# Patient Record
Sex: FEMALE | Race: WHITE | NOT HISPANIC OR LATINO | Employment: FULL TIME | URBAN - METROPOLITAN AREA
[De-identification: names, ages, dates, MRNs, and addresses within clinical notes are randomized per-mention and may not be internally consistent; named-entity substitution may affect disease eponyms.]

---

## 2017-05-05 ENCOUNTER — ALLSCRIPTS OFFICE VISIT (OUTPATIENT)
Dept: OTHER | Facility: OTHER | Age: 42
End: 2017-05-05

## 2017-05-17 ENCOUNTER — LAB CONVERSION - ENCOUNTER (OUTPATIENT)
Dept: OTHER | Facility: OTHER | Age: 42
End: 2017-05-17

## 2017-05-17 LAB — GLUCOSE SERPL-MCNC: 92 MG/DL

## 2017-07-05 ENCOUNTER — ALLSCRIPTS OFFICE VISIT (OUTPATIENT)
Dept: OTHER | Facility: OTHER | Age: 42
End: 2017-07-05

## 2017-07-05 LAB
BILIRUB UR QL STRIP: NEGATIVE
CLARITY UR: NORMAL
COLOR UR: CLEAR
GLUCOSE (HISTORICAL): NORMAL
HGB UR QL STRIP.AUTO: NEGATIVE
KETONES UR STRIP-MCNC: NEGATIVE MG/DL
LEUKOCYTE ESTERASE UR QL STRIP: NEGATIVE
NITRITE UR QL STRIP: NEGATIVE
PH UR STRIP.AUTO: 5 [PH]
PROT UR STRIP-MCNC: NEGATIVE MG/DL
SP GR UR STRIP.AUTO: 1.01
UROBILINOGEN UR QL STRIP.AUTO: NORMAL

## 2017-08-15 ENCOUNTER — TRANSCRIBE ORDERS (OUTPATIENT)
Dept: ADMINISTRATIVE | Facility: HOSPITAL | Age: 42
End: 2017-08-15

## 2017-08-15 DIAGNOSIS — Z12.31 ENCOUNTER FOR MAMMOGRAM TO ESTABLISH BASELINE MAMMOGRAM: Primary | ICD-10-CM

## 2017-08-21 ENCOUNTER — HOSPITAL ENCOUNTER (OUTPATIENT)
Dept: RADIOLOGY | Facility: HOSPITAL | Age: 42
Discharge: HOME | End: 2017-08-21
Attending: OBSTETRICS & GYNECOLOGY
Payer: COMMERCIAL

## 2017-08-21 DIAGNOSIS — Z12.31 ENCOUNTER FOR MAMMOGRAM TO ESTABLISH BASELINE MAMMOGRAM: ICD-10-CM

## 2017-08-21 PROCEDURE — G0202 SCR MAMMO BI INCL CAD: HCPCS

## 2017-08-28 ENCOUNTER — TRANSCRIBE ORDERS (OUTPATIENT)
Dept: ADMINISTRATIVE | Facility: HOSPITAL | Age: 42
End: 2017-08-28

## 2017-08-28 DIAGNOSIS — G40.209 CRYPTOGENIC PARTIAL COMPLEX EPILEPSY (HCC): Primary | ICD-10-CM

## 2017-09-06 ENCOUNTER — HOSPITAL ENCOUNTER (OUTPATIENT)
Dept: NEUROLOGY | Facility: HOSPITAL | Age: 42
Discharge: HOME | End: 2017-09-06
Payer: COMMERCIAL

## 2017-09-06 DIAGNOSIS — G40.209 CRYPTOGENIC PARTIAL COMPLEX EPILEPSY (HCC): ICD-10-CM

## 2017-09-06 PROCEDURE — 95816 EEG AWAKE AND DROWSY: CPT

## 2017-09-08 ENCOUNTER — ALLSCRIPTS OFFICE VISIT (OUTPATIENT)
Dept: OTHER | Facility: OTHER | Age: 42
End: 2017-09-08

## 2018-01-09 NOTE — PROGRESS NOTES
Assessment    1  Encounter for preventive health examination (V70 0) (Z00 00)   2  Classic migraine with aura (346 00) (G43 109)   3  Generalized nonconvulsive seizure (345 00) (G40 309)   4  History of Need for vaccination (V05 9) (Z23)   5  Need for vaccination (V05 9) (Z23)   6  Hyperlipemia, mixed (272 2) (E78 2)   7  GERD (gastroesophageal reflux disease) (530 81) (K21 9)    Plan  Classic migraine with aura    · CarBAMazepine 200 MG Oral Tablet; 1 Three Times A Day, dispense as  written   · SUMAtriptan Succinate 100 MG Oral Tablet (Imitrex); TAKE 1 TABLET WITH  AURA  MAY REPEAT DOSE IN 2 HOURS IF HEADACHES CONTINUE  Pt suffers from 15 migrains a month   · Sumavel DosePro 6 MG/0 5ML Subcutaneous Solution Jet-injector; 6mg SC x  1  not to exceed 2 doses in 24 hrs  Classic migraine with aura, Generalized nonconvulsive seizure    · (1) CBC/PLT/DIFF; Status:Active; Requested for:03Apr2017;    · (1) COMPREHENSIVE METABOLIC PANEL; Status:Active; Requested for:03Apr2017;    · Follow-up visit in 6 months Evaluation and Treatment  Follow-up  Status: Hold For -  Scheduling  Requested for: 40QAM6258  GERD (gastroesophageal reflux disease)    · Omeprazole 40 MG Oral Capsule Delayed Release (PriLOSEC); take 1 capsule by  mouth daily   · Follow-up visit in 1 month Evaluation and Treatment  Follow-up  Status: Hold For -  Scheduling  Requested for: 49Hmy0626  Hyperlipemia, mixed    · Call [phone] if: You have muscle cramps ; Status:Complete;   Done: 97OGP6314  10:10AM   · Call [phone] if: You have pain in the stomach area ; Status:Complete;   Done:  20BNH5799 10:10AM   · Call [phone] if: You start vomiting ; Status:Complete;   Done: 84TCM5300 10:10AM   · Call 911 if:  You experience a new kind of chest pain (angina) or pressure ;  Status:Complete;   Done: 32EPA0712 10:10AM   · Call 911 if: You have any symptoms of a stroke ; Status:Complete;   Done: 13ULF1644  10:10AM   · Begin a limited exercise program ; Status:Complete;   Done: 58LKR2199 10:10AM   · Begin or continue regular aerobic exercise  Gradually work up to at least 3 sessions of 30  minutes of exercise a week ; Status:Complete;   Done: 17CXY3556 10:10AM   · Continue with our present treatment plan ; Status:Complete;   Done: 98AOG1429 10:10AM   · Eat a low fat and low cholesterol diet ; Status:Complete;   Done: 80BEQ2456 10:10AM   · Eat no more than 30 grams of fat per day ; Status:Complete;   Done: 79FPZ1244 10:10AM   · Keep a diary of when and what you eat ; Status:Complete;   Done: 26UGJ1384 10:10AM   · Some eating tips that can help you lose weight ; Status:Complete;   Done: 99TCE2438  10:10AM   · We recommend that you bring your body mass index down to 26 ; Status:Complete;    Done: 97ERP7167 10:10AM   · We want you to follow the Therapeutic Lifestyle Changes (TLC) diet ; Status:Complete;    Done: 26KGX7388 10:10AM   · You need to quit smoking ; Status:Complete;   Done: 51TBR5509 10:10AM  Need for vaccination    · Flulaval Quadrivalent 0 5 ML Intramuscular Suspension; IM as ordered; To  Be Done: 88QPO9476    Discussion/Summary  health maintenance visit     Pt advised on probiotics      will follow response to prilosec in a month  Chief Complaint  CPE rmklpn      History of Present Illness  HPI: as above    Pt is here for a full physical  Pt had her mammo in july    pt states while she is here she has issues to discuss  pt states she has had some diarrhea and pian in her stomach randomly - states this has been on and off, for months  states its not enough to stop her day  she states she has diarrhea more often than often than not, goes once or twice in a day, pt states sometimes the abdominal pain in under her diaphram sometimes it lower  sometits a burning feeling      Pt states he was here 2 years ago with a UTI  states she seems to have an ongoing issue with that where she feels she has a UTI  states in [place] - states she was going to get her period she had blood in her urine - pt was placed on abx  pt states she has a history of colon ca - her grandmother and her parents - her grandmother was diagnosed in her [de-identified]  pt states dr Cleveland Quintanilla has been watching her sugar for her ovarian cysts    pt would like her flu shot       Review of Systems    Constitutional: No fever, no chills, feels well, no tiredness, no recent weight gain or weight loss  Eyes: No complaints of eye pain, no red eyes, no eyesight problems, no discharge, no dry eyes, no itching of eyes  ENT: no complaints of earache, no loss of hearing, no nose bleeds, no nasal discharge, no sore throat, no hoarseness  Cardiovascular: No complaints of slow heart rate, no fast heart rate, no chest pain, no palpitations, no leg claudication, no lower extremity edema  Respiratory: No complaints of shortness of breath, no wheezing, no cough, no SOB on exertion, no orthopnea, no PND  Gastrointestinal: abdominal pain, nausea and diarrhea, but as noted in HPI and no constipation  Genitourinary: No complaints of dysuria, no incontinence, no pelvic pain, no dysmenorrhea, no vaginal discharge or bleeding  Musculoskeletal: No complaints of arthralgias, no myalgias, no joint swelling or stiffness, no limb pain or swelling  Integumentary: No complaints of skin rash or lesions, no itching, no skin wounds, no breast pain or lump  Neurological: No complaints of headache, no confusion, no convulsions, no numbness, no dizziness or fainting, no tingling, no limb weakness, no difficulty walking  Psychiatric: Not suicidal, no sleep disturbance, no anxiety or depression, no change in personality, no emotional problems  Endocrine: No complaints of proptosis, no hot flashes, no muscle weakness, no deepening of the voice, no feelings of weakness     Hematologic/Lymphatic: No complaints of swollen glands, no swollen glands in the neck, does not bleed easily, does not bruise easily  Active Problems    1  Anxiety disorder (300 00) (F41 9)   2  Classic migraine with aura (346 00) (G43 109)   3  Encounter for screening for cardiovascular disorders (V81 2) (Z13 6)   4  Encounter for screening mammogram for breast cancer (V76 12) (Z12 31)   5  Former smoker (V15 82) (B95 958)   6  Generalized nonconvulsive seizure (345 00) (G40 309)   7  Screening for diabetes mellitus (DM) (V77 1) (Z13 1)   8  Screening for thyroid disorder (V77 0) (Z13 29)   9  Strain of thoracic region (847 1) (S29 019A)   10  UTI (lower urinary tract infection) (599 0) (N39 0)   11  Well adult on routine health check (V70 0) (Z00 00)    Past Medical History    · History of Acute maxillary sinusitis (461 0) (J01 00)   · History of Acute upper respiratory infection (465 9) (J06 9)   · History of Cervical radiculopathy (723 4) (M54 12)   · History of chest pain (V13 89) (I16 046)   · History of Lightheadedness (780 4) (R42)   · History of Near syncope (780 2) (R55)   · History of Need for vaccination (V05 9) (Z23)   · History of Pain, upper back (724 5) (M54 9)   · History of Seizure (780 39) (R56 9)    Surgical History    · History of Adenoidectomy   · History of Ovarian Cystectomy   · History of Renal Lithotripsy    Family History  Mother    · Family history of Multiple sclerosis  Maternal Grandmother    · Family history of Colon Cancer (V16 0)  Family History    · Family history of Arthritis (V17 7)   · Family history of Colon Cancer (V16 0)   · Family history of Osteoporosis (V17 81)    Social History    · Denied: History of Alcohol Use (History)   · Daily Coffee Consumption (___ Cups/Day)   · Daily Tea Consumption (___ Cups/Day)   ·    · Former smoker (V15 82) (A14 138)   · Lack of exercise (V69 0) (Z72 3)   · Rarely consumes alcohol (V49 89) (Z78 9)   · Sleeps 6 -7 hours a day    Current Meds   1  CarBAMazepine 200 MG Oral Tablet; 1 Three Times A Day, dispense as written;    Therapy: 04MSG1804 to (Last Rx:37Cin5169)  Requested for: 30KTU2573 Ordered   2  Loestrin 24 Fe 1-20 MG-MCG TABS; 1 Every Day; Therapy: 69NLY5927 to  Requested for: 81XYU0592 Recorded   3  SUMAtriptan Succinate 100 MG Oral Tablet; TAKE 1 TABLET WITH AURA  MAY REPEAT   DOSE IN 2 HOURS IF HEADACHES CONTINUE  Pt suffers from 15 migrains a month; Therapy: 41ENN0885 to (Evaluate:20Oct2016)  Requested for: 33XJS4600; Last   Rx:98Fxn4800 Ordered   4  Sumavel DosePro 6 MG/0 5ML Subcutaneous Solution Jet-injector; 6mg SC x 1   not to exceed 2 doses in 24 hrs; Therapy: 39QWD3103 to (Last Rx:59Dqe5124)  Requested for: 98TJN2298 Ordered   5  Valtrex 1 GM Oral Tablet; Take 1 tablet daily Recorded    Allergies    1  Dilantin CAPS    Vitals   Recorded: 13FGV1937 72:61KC   Systolic 059   Diastolic 70   Heart Rate 78   Respiration 18   Temperature 97 4 F   Height 5 ft 1 in   Weight 133 lb    BMI Calculated 25 13   BSA Calculated 1 58     Physical Exam    Constitutional   General appearance: No acute distress, well appearing and well nourished  Eyes   Conjunctiva and lids: No swelling, erythema or discharge  Pupils and irises: Equal, round, reactive to light  Ophthalmoscopic examination: Normal fundi and optic discs  Ears, Nose, Mouth, and Throat   External inspection of ears and nose: Normal     Otoscopic examination: Tympanic membranes translucent with normal light reflex  Canals patent without erythema  Hearing: Normal     Nasal mucosa, septum, and turbinates: Normal without edema or erythema  Lips, teeth, and gums: Normal, good dentition  Oropharynx: Normal with no erythema, edema, exudate or lesions  Neck   Neck: Supple, symmetric, trachea midline, no masses  Thyroid: Normal, no thyromegaly  Pulmonary   Respiratory effort: No increased work of breathing or signs of respiratory distress  Percussion of chest: Normal     Palpation of chest: Normal     Auscultation of lungs: Clear to auscultation  Cardiovascular   Palpation of heart: Normal PMI, no thrills  Auscultation of heart: Normal rate and rhythm, normal S1 and S2, no murmurs  Carotid pulses: 2+ bilaterally  Abdominal aorta: Normal     Femoral pulses: 2+ bilaterally  Pedal pulses: 2+ bilaterally  Examination of extremities for edema and/or varicosities: Normal     Chest   Breasts: Normal, no dimpling or skin changes appreciated  Palpation of breasts and axillae: Normal, no masses palpated  Abdomen   Abdomen: Non-tender, no masses  Liver and spleen: No hepatomegaly or splenomegaly  Lymphatic   Palpation of lymph nodes in neck: No lymphadenopathy  Palpation of lymph nodes in axillae: No lymphadenopathy  Palpation of lymph nodes in groin: No lymphadenopathy  Palpation of lymph nodes in other areas: No lymphadenopathy  Musculoskeletal   Gait and station: Normal     Digits and nails: Normal without clubbing or cyanosis  Joints, bones, and muscles: Normal     Range of motion: Normal     Stability: Normal     Muscle strength/tone: Normal     Skin   Skin and subcutaneous tissue: Normal without rashes or lesions  Palpation of skin and subcutaneous tissue: Normal turgor  Neurologic   Cranial nerves: Cranial nerves II-XII intact  Reflexes: 2+ and symmetric  Sensation: No sensory loss  Psychiatric   Judgment and insight: Normal     Orientation to person, place, and time: Normal     Recent and remote memory: Intact      Mood and affect: Normal        Results/Data  () CMP14+eGFR 38VSO5345 10:22AM [facility]     Test Name Result Flag Reference   Glucose, Serum 82 mg/dL  65-99   BUN 14 mg/dL  6-24   Creatinine, Serum 0 70 mg/dL  0 57-1 00   eGFR If NonAfricn Am 109 mL/min/1 73  >59   eGFR If Africn Am 125 mL/min/1 73  >59   BUN/Creatinine Ratio 20  9-23   Sodium, Serum 142 mmol/L  134-144   Potassium, Serum 4 7 mmol/L  3 5-5 2   Chloride, Serum 105 mmol/L     Carbon Dioxide, Total 23 mmol/L  18-29 Calcium, Serum 8 6 mg/dL L 8 7-10 2   Protein, Total, Serum 6 0 g/dL  6 0-8 5   Albumin, Serum 3 8 g/dL  3 5-5 5   Globulin, Total 2 2 g/dL  1 5-4 5   A/G Ratio 1 7  1 1-2 5   Bilirubin, Total 0 2 mg/dL  0 0-1 2   Alkaline Phosphatase, S 60 IU/L     AST (SGOT) 20 IU/L  0-40   ALT (SGPT) 14 IU/L  0-32     (LC) Lipid Panel With LDL/HDL Ratio 29NAL9983 10:22AM [facility]     Test Name Result Flag Reference   Cholesterol, Total 204 mg/dL H 100-199   Triglycerides 70 mg/dL  0-149   HDL Cholesterol 92 mg/dL  >39   According to ATP-III Guidelines, HDL-C >59 mg/dL is considered a  negative risk factor for CHD  VLDL Cholesterol Ray 14 mg/dL  5-40   LDL Cholesterol Calc 98 mg/dL  0-99   LDL/HDL Ratio 1 1 ratio units  0 0-3 2   LDL/HDL Ratio                                                             Men  Women                                               1/2 Avg  Risk  1 0    1 5                                                   Avg Risk  3 6    3 2                                                2X Avg  Risk  6 2    5 0                                                3X Avg  Risk  8 0    6 1     () TSH+Free T4 [id number] 10:22AM [facility]     Test Name Result Flag Reference   TSH 1 430 uIU/mL  0 450-4 500   T4,Free(Direct) 0 76 ng/dL L 0 82-1 77     (LC) CBC+Platelet+Hem Review 69QAN3925 10:22AM [facility]     Test Name Result Flag Reference   WBC 6 0 x10E3/uL  3 4-10 8   RBC 4 11 x10E6/uL  3 77-5 28   Hemoglobin 13 0 g/dL  11 1-15 9   Hematocrit 37 8 %  34 0-46  6   MCV 92 fL  79-97   MCH 31 6 pg  26 6-33 0   MCHC 34 4 g/dL  31 5-35 7   RDW 12 7 %  12 3-15 4   Platelets 113 F51P7/GY  150-379   Neutrophils 46 %     Lymphs 45 %     Monocytes 7 %     Eos 0 %     Basos 2 %     Neutrophils Absolute 2 8 X10E3/uL  1 4-7 0   Lymphs (Absolute) 2 7 X10E3/uL  0 7-3 1   Monocytes(Absolute) 0 4 X10E3/uL  0 1-0 9   Eos (Absolute Value) 0 0 X10E3/uL  0 0-0 4   Baso(Absolute) 0 1 X10E3/uL  0 0-0 2   RBC Comment RBC's appear normal   Normal   Platelet Comment Comment  Adequate   Platelet count verified by examination of peripheral blood smear  Procedure    Procedure: Visual Acuity Test    Indication: routine screening  Inforrmation supplied by a Snellen chart     Results: 20/25 in both eyes without corrective device, 20/30 in the right eye without corrective device, 20/25 in the left eye without corrective device      Signatures   Electronically signed by : Cristopher Morales DO; Oct  3 2016 10:16AM EST                       (Author)

## 2018-01-12 VITALS
RESPIRATION RATE: 16 BRPM | HEIGHT: 61 IN | WEIGHT: 128 LBS | HEART RATE: 74 BPM | BODY MASS INDEX: 24.17 KG/M2 | SYSTOLIC BLOOD PRESSURE: 120 MMHG | DIASTOLIC BLOOD PRESSURE: 82 MMHG | TEMPERATURE: 98.4 F

## 2018-01-12 NOTE — RESULT NOTES
Verified Results  [facility]) CBC+Platelet+Hem Review 90EOG2443 10:22AM [facility]     Test Name Result Flag Reference   Platelet Comment Comment  Adequate   Platelet count verified by examination of peripheral blood smear     RBC Comment RBC's appear normal   Normal   Baso(Absolute) 0 1 X10E3/uL  0 0-0 2   Eos (Absolute Value) 0 0 X10E3/uL  0 0-0 4   Monocytes(Absolute) 0 4 X10E3/uL  0 1-0 9   Lymphs (Absolute) 2 7 X10E3/uL  0 7-3 1   Neutrophils Absolute 2 8 X10E3/uL  1 4-7 0   Basos 2 %     Eos 0 %     Monocytes 7 %     Lymphs 45 %     Neutrophils 46 %     Platelets 824 J67G8/KS  150-379   RDW 12 7 %  12 3-15 4   MCHC 34 4 g/dL  31 5-35 7   MCH 31 6 pg  26 6-33 0   MCV 92 fL  79-97   Hematocrit 37 8 %  34 0-46 6   Hemoglobin 13 0 g/dL  11 1-15 9   RBC 4 11 x10E6/uL  3 77-5 28   WBC 6 0 x10E3/uL  3 4-10 8       Discussion/Summary   lab acceptable

## 2018-01-13 VITALS
WEIGHT: 136 LBS | SYSTOLIC BLOOD PRESSURE: 126 MMHG | DIASTOLIC BLOOD PRESSURE: 84 MMHG | HEART RATE: 88 BPM | BODY MASS INDEX: 25.68 KG/M2 | RESPIRATION RATE: 16 BRPM | TEMPERATURE: 97.9 F | HEIGHT: 61 IN

## 2018-01-14 VITALS
HEIGHT: 61 IN | BODY MASS INDEX: 25.86 KG/M2 | TEMPERATURE: 98.4 F | DIASTOLIC BLOOD PRESSURE: 70 MMHG | RESPIRATION RATE: 16 BRPM | HEART RATE: 82 BPM | WEIGHT: 137 LBS | SYSTOLIC BLOOD PRESSURE: 122 MMHG

## 2018-01-16 NOTE — RESULT NOTES
Verified Results  [facility]) CMP14+eGFR [id number] 10:22AM [facility]     Test Name Result Flag Reference   Glucose, Serum 82 mg/dL  65-99   BUN 14 mg/dL  6-24   Creatinine, Serum 0 70 mg/dL  0 57-1 00   eGFR If NonAfricn Am 109 mL/min/1 73  >59   eGFR If Africn Am 125 mL/min/1 73  >59   BUN/Creatinine Ratio 20  9-23   Sodium, Serum 142 mmol/L  134-144   Potassium, Serum 4 7 mmol/L  3 5-5 2   Chloride, Serum 105 mmol/L     Carbon Dioxide, Total 23 mmol/L  18-29   Calcium, Serum 8 6 mg/dL L 8 7-10 2   Protein, Total, Serum 6 0 g/dL  6 0-8 5   Albumin, Serum 3 8 g/dL  3 5-5 5   Globulin, Total 2 2 g/dL  1 5-4 5   A/G Ratio 1 7  1 1-2 5   Bilirubin, Total 0 2 mg/dL  0 0-1 2   Alkaline Phosphatase, S 60 IU/L     AST (SGOT) 20 IU/L  0-40   ALT (SGPT) 14 IU/L  0-32     (LC) Lipid Panel With LDL/HDL Ratio [id number] 10:22AM [facility]     Test Name Result Flag Reference   Cholesterol, Total 204 mg/dL H 100-199   Triglycerides 70 mg/dL  0-149   HDL Cholesterol 92 mg/dL  >39   According to ATP-III Guidelines, HDL-C >59 mg/dL is considered a  negative risk factor for CHD  VLDL Cholesterol Ray 14 mg/dL  5-40   LDL Cholesterol Calc 98 mg/dL  0-99   LDL/HDL Ratio 1 1 ratio units  0 0-3 2   LDL/HDL Ratio                                                             Men  Women                                               1/2 Avg  Risk  1 0    1 5                                                   Avg Risk  3 6    3 2                                                2X Avg  Risk  6 2    5 0                                                3X Avg  Risk  8 0    6 1     (LC) TSH+Free T4 [id number] 10:22AM [facility]     Test Name Result Flag Reference   TSH 1 430 uIU/mL  0 450-4 500   T4,Free(Direct) 0 76 ng/dL L 0 82-1 77       Discussion/Summary   labs acceptable

## 2018-01-26 DIAGNOSIS — E78.2 HYPERLIPEMIA, MIXED: ICD-10-CM

## 2018-01-26 DIAGNOSIS — G43.109 MIGRAINE WITH AURA AND WITHOUT STATUS MIGRAINOSUS, NOT INTRACTABLE: Primary | ICD-10-CM

## 2018-01-26 RX ORDER — NORETHINDRONE ACETATE AND ETHINYL ESTRADIOL AND FERROUS FUMARATE 1MG-20(24)
KIT ORAL DAILY
COMMUNITY
Start: 2010-10-05 | End: 2018-03-27

## 2018-01-26 RX ORDER — NIFEDIPINE 20 MG/1
CAPSULE ORAL 2 TIMES DAILY
COMMUNITY
Start: 2017-07-05 | End: 2018-03-27

## 2018-01-26 RX ORDER — CARBAMAZEPINE 200 MG/1
2 TABLET ORAL 2 TIMES DAILY
COMMUNITY
Start: 2012-04-24 | End: 2018-03-27

## 2018-01-26 RX ORDER — SUMATRIPTAN 100 MG/1
100 TABLET, FILM COATED ORAL ONCE AS NEEDED
Qty: 15 TABLET | Refills: 1 | Status: SHIPPED | OUTPATIENT
Start: 2018-01-26 | End: 2018-03-27 | Stop reason: SDUPTHER

## 2018-01-26 RX ORDER — SUMATRIPTAN 100 MG/1
TABLET, FILM COATED ORAL
COMMUNITY
Start: 2012-06-08 | End: 2018-01-26 | Stop reason: SDUPTHER

## 2018-01-26 RX ORDER — VALACYCLOVIR HYDROCHLORIDE 1 G/1
1 TABLET, FILM COATED ORAL DAILY
COMMUNITY
End: 2018-03-27

## 2018-03-27 ENCOUNTER — OFFICE VISIT (OUTPATIENT)
Dept: FAMILY MEDICINE CLINIC | Facility: CLINIC | Age: 43
End: 2018-03-27
Payer: COMMERCIAL

## 2018-03-27 VITALS
SYSTOLIC BLOOD PRESSURE: 112 MMHG | HEART RATE: 80 BPM | DIASTOLIC BLOOD PRESSURE: 74 MMHG | RESPIRATION RATE: 16 BRPM | BODY MASS INDEX: 23.79 KG/M2 | HEIGHT: 61 IN | TEMPERATURE: 97.6 F | WEIGHT: 126 LBS

## 2018-03-27 DIAGNOSIS — F41.9 ANXIETY DISORDER, UNSPECIFIED TYPE: ICD-10-CM

## 2018-03-27 DIAGNOSIS — Z00.00 WELL ADULT EXAM: Primary | ICD-10-CM

## 2018-03-27 DIAGNOSIS — Z13.6 SCREENING FOR CARDIOVASCULAR CONDITION: ICD-10-CM

## 2018-03-27 DIAGNOSIS — R22.1 MASS IN NECK: ICD-10-CM

## 2018-03-27 DIAGNOSIS — G43.109 MIGRAINE WITH AURA AND WITHOUT STATUS MIGRAINOSUS, NOT INTRACTABLE: ICD-10-CM

## 2018-03-27 DIAGNOSIS — E78.2 HYPERLIPEMIA, MIXED: ICD-10-CM

## 2018-03-27 PROCEDURE — 99396 PREV VISIT EST AGE 40-64: CPT | Performed by: FAMILY MEDICINE

## 2018-03-27 RX ORDER — PROPRANOLOL HYDROCHLORIDE 10 MG/1
10 TABLET ORAL 3 TIMES DAILY
Qty: 90 TABLET | Refills: 3 | Status: SHIPPED | OUTPATIENT
Start: 2018-03-27 | End: 2019-04-03 | Stop reason: ALTCHOICE

## 2018-03-27 RX ORDER — CEFUROXIME AXETIL 250 MG/1
TABLET ORAL
COMMUNITY
Start: 2018-01-12 | End: 2018-03-27

## 2018-03-27 RX ORDER — VALACYCLOVIR HYDROCHLORIDE 500 MG/1
500 TABLET, FILM COATED ORAL
Refills: 2 | COMMUNITY
Start: 2018-03-20

## 2018-03-27 RX ORDER — CARBAMAZEPINE 300 MG/1
300 CAPSULE, EXTENDED RELEASE ORAL EVERY 12 HOURS
Refills: 1 | COMMUNITY
Start: 2018-01-23

## 2018-03-27 RX ORDER — SUMATRIPTAN 100 MG/1
100 TABLET, FILM COATED ORAL ONCE AS NEEDED
Qty: 15 TABLET | Refills: 3 | Status: SHIPPED | OUTPATIENT
Start: 2018-03-27 | End: 2018-09-12 | Stop reason: SDUPTHER

## 2018-03-27 NOTE — PROGRESS NOTES
FAMILY PRACTICE HEALTH MAINTENANCE OFFICE VISIT  [facility]    NAME: Grace Smith  AGE: 43 y o   SEX: female  : 1975     DATE: 3/27/2018    Assessment and Plan     Problem List Items Addressed This Visit     Classic migraine with aura    Relevant Medications    propranolol (INDERAL) 10 mg tablet    SUMAtriptan (IMITREX) 100 mg tablet    Other Relevant Orders    Lipid Panel with Direct LDL reflex    Comprehensive metabolic panel    CBC and differential    TSH, 3rd generation    Anxiety disorder    Relevant Orders    Lipid Panel with Direct LDL reflex    Comprehensive metabolic panel    CBC and differential    TSH, 3rd generation    Hyperlipemia, mixed    Relevant Orders    Lipid Panel with Direct LDL reflex    Comprehensive metabolic panel    CBC and differential    TSH, 3rd generation      Other Visit Diagnoses     Well adult exam    -  Primary    Relevant Orders    Lipid Panel with Direct LDL reflex    Comprehensive metabolic panel    CBC and differential    TSH, 3rd generation    Screening for cardiovascular condition        Relevant Orders    Lipid Panel with Direct LDL reflex    Comprehensive metabolic panel    CBC and differential    TSH, 3rd generation    Mass in neck        Relevant Orders    Lipid Panel with Direct LDL reflex    Comprehensive metabolic panel    CBC and differential    TSH, 3rd generation    US head neck soft tissue            · Patient Counseling:   · Nutrition: Stressed importance of a well balanced diet, moderation of sodium/saturated fat, caloric balance and sufficient intake of fiber  · Exercise: Stressed the importance of regular exercise with a goal of 150 minutes per week  · Dental Health: Discussed daily flossing and brushing and regular dental visits   · Sexuality: Discussed sexually transmitted infections, use of condoms and prevention of unintended pregnancy  · Alcohol Use:  Recommended moderation of alcohol intake  · Injury Prevention: Discussed Safety Belts, Safety Helmets, and Smoke Detectors    · Immunizations reviewed  · Discussed benefits of screening yes  · Discussed the patient's BMI with her  The BMI is in the acceptable range     Return in about 4 weeks (around 4/24/2018)  Chief Complaint     Chief Complaint   Patient presents with    Physical Exam     Pt here for a CPE         History of Present Illness     Pt is here for a full physical    Pt states we tried a med for her migrains states we upped it and it gave her HA  This is nifedipine    Last mammo was in July last year - was acceptable  Pap/pelvic up to date        Well Adult Physical   Patient here for a comprehensive physical exam       Diet and Physical Activity  Diet: well balanced diet  Weight concerns: None, patient's BMI is between 18 5-24 9  Exercise: daily      Depression Screen  PHQ-9 Depression Screening    PHQ-9:    Frequency of the following problems over the past two weeks:       Little interest or pleasure in doing things:  0 - not at all  Feeling down, depressed, or hopeless:  0 - not at all  PHQ-2 Score:  0          General Health  Hearing: Normal:  bilateral  Vision: no vision problems  Dental: regular dental visits          The following portions of the patient's history were reviewed and updated as appropriate: allergies, current medications, past family history, past medical history, past social history, past surgical history and problem list     Review of Systems     Review of Systems    Past Medical History     Past Medical History:   Diagnosis Date    Anxiety     Cervical radiculopathy     RESOLVED: 76ZRH4503    Seizure ([facility] [address] )        Past Surgical History     Past Surgical History:   Procedure Laterality Date    ADENOIDECTOMY      LITHOTRIPSY  2006    RENAL    OVARIAN CYST REMOVAL  2012       Social History     Social History     Social History    Marital status: /Civil Union     Spouse name: N/A    Number of children: N/A  Years of education: N/A     Social History Main Topics    Smoking status: Former Smoker    Smokeless tobacco: Never Used    Alcohol use No      Comment: RARELY CONSUMES ALCOHOL AS PER ALL SCRIPTS     Drug use: Unknown    Sexual activity: Not Asked     Other Topics Concern    None     Social History Narrative    Daily coffee consumption    Daily tea consumption     as per all scripts    Lack of exercise    Sleeps 6-7 hours a day        Family History     Family History   Problem Relation Age of Onset    Multiple sclerosis Mother     Colon cancer Maternal Grandmother     Rheum arthritis Maternal Grandmother     Arthritis Family     Colon cancer Family     Osteoporosis Family     Anxiety disorder Father     Chromosomal disorder Cousin        Current Medications       Current Outpatient Prescriptions:     carBAMazepine (CARBATROL) 300 MG 12 hr capsule, TK 1 C PO BID, Disp: , Rfl: 1    SUMAtriptan (IMITREX) 100 mg tablet, Take 1 tablet (100 mg total) by mouth once as needed for migraine for up to 1 dose, Disp: 15 tablet, Rfl: 3    valACYclovir (VALTREX) 500 mg tablet, TK 1 T PO QD, Disp: , Rfl: 2    propranolol (INDERAL) 10 mg tablet, Take 1 tablet (10 mg total) by mouth 3 (three) times a day, Disp: 90 tablet, Rfl: 3     Allergies     Allergies   Allergen Reactions    Phenytoin Rash     Reaction Date: [id number];         Objective     /74   Pulse 80   Temp 97 6 °F (36 4 °C)   Resp 16   Ht 5' 0 75" (1 543 m)   Wt 57 2 kg (126 lb)   LMP 03/19/2018 (Approximate)   BMI 24 00 kg/m²      Physical Exam       Visual Acuity Screening    Right eye Left eye Both eyes   Without correction:      With correction: 20/20 20/20 20/20       Health Maintenance     Health Maintenance   Topic Date Due    HIV SCREENING  1975    Depression Screening PHQ-9  09/11/1987    INFLUENZA VACCINE  09/01/2017    DTaP,Tdap,and Td Vaccines (2 - Td) 10/05/2020     Immunization History   Administered Date(s) Administered    Influenza Quadrivalent Preservative Free 3 years and older IM 10/03/2016    Influenza Quadrivalent, 6-35 Months IM 12/04/2015    Tdap 10/05/2010       [facility], [address]

## 2018-03-27 NOTE — PATIENT INSTRUCTIONS
Acute Headache   WHAT YOU NEED TO KNOW:   An acute headache is pain or discomfort that starts suddenly and gets worse quickly  You may have an acute headache only when you feel stress or eat certain foods  Other acute headache pain can happen every day, and sometimes several times a day  DISCHARGE INSTRUCTIONS:   Return to the emergency department if:   · You have severe pain  · You have numbness or weakness on one side of your face or body  · You have a headache that occurs after a blow to the head, a fall, or other trauma  · You have a headache, are forgetful or confused, or have trouble speaking  · You have a headache, stiff neck, and a fever  Contact your healthcare provider if:   · You have a constant headache and are vomiting  · You have a headache each day that does not get better, even after treatment  · You have changes in your headaches, or new symptoms that occur when you have a headache  · You have questions or concerns about your condition or care  Medicines: You may need any of the following:  · Prescription pain medicine  may be given  The medicine your healthcare provider recommends will depend on the kind of headaches you have  You will need to take prescription headache medicines as directed to prevent a problem called rebound headache  These headaches happen with regular use of pain relievers for headache disorders  · NSAIDs , such as ibuprofen, help decrease swelling, pain, and fever  This medicine is available with or without a doctor's order  NSAIDs can cause stomach bleeding or kidney problems in certain people  If you take blood thinner medicine, always ask your healthcare provider if NSAIDs are safe for you  Always read the medicine label and follow directions  · Acetaminophen  decreases pain and fever  It is available without a doctor's order  Ask how much to take and how often to take it  Follow directions   Read the labels of all other medicines you are using to see if they also contain acetaminophen, or ask your doctor or pharmacist  Acetaminophen can cause liver damage if not taken correctly  Do not use more than 3 grams (3,000 milligrams) total of acetaminophen in one day  · Antidepressants  may be given for some kinds of headaches  · Take your medicine as directed  Contact your healthcare provider if you think your medicine is not helping or if you have side effects  Tell him or her if you are allergic to any medicine  Keep a list of the medicines, vitamins, and herbs you take  Include the amounts, and when and why you take them  Bring the list or the pill bottles to follow-up visits  Carry your medicine list with you in case of an emergency  Manage your symptoms:   · Apply heat or ice  on the headache area  Use a heat or ice pack  For an ice pack, you can also put crushed ice in a plastic bag  Cover the pack or bag with a towel before you apply it to your skin  Ice and heat both help decrease pain, and heat also helps decrease muscle spasms  Apply heat for 20 to 30 minutes every 2 hours  Apply ice for 15 to 20 minutes every hour  Apply heat or ice for as long and for as many days as directed  You may alternate heat and ice  · Relax your muscles  Lie down in a comfortable position and close your eyes  Relax your muscles slowly  Start at your toes and work your way up your body  · Keep a record of your headaches  Write down when your headaches start and stop  Include your symptoms and what you were doing when the headache began  Record what you ate or drank for 24 hours before the headache started  Describe the pain and where it hurts  Keep track of what you did to treat your headache and if it worked  Prevent an acute headache:   · Avoid anything that triggers an acute headache  Examples include exposure to chemicals, going to high altitude, or not getting enough sleep  Create a regular sleep routine   Go to sleep at the same time and wake up at the same time each day  Do not use electronic devices before bedtime  These may trigger a headache or prevent you from sleeping well  · Do not smoke  Nicotine and other chemicals in cigarettes and cigars can trigger an acute headache or make it worse  Ask your healthcare provider for information if you currently smoke and need help to quit  E-cigarettes or smokeless tobacco still contain nicotine  Talk to your healthcare provider before you use these products  · Limit alcohol as directed  Alcohol can trigger an acute headache or make it worse  If you have cluster headaches, do not drink alcohol during an episode  For other types of headaches, ask your healthcare provider if it is safe for you to drink alcohol  Ask how much is safe for you to drink, and how often  · Exercise as directed  Exercise can reduce tension and help with headache pain  Aim for 30 minutes of physical activity on most days of the week  Your healthcare provider can help you create an exercise plan  · Eat a variety of healthy foods  Healthy foods include fruits, vegetables, low-fat dairy products, lean meats, fish, whole grains, and cooked beans  Your healthcare provider or dietitian can help you create meals plans if you need to avoid foods that trigger headaches  Follow up with your healthcare provider as directed:  Bring your headache record with you when you see your healthcare provider  Write down your questions so you remember to ask them during your visits  © 2017 [address] [facility] Information is for End User's use only and may not be sold, redistributed or otherwise used for commercial purposes  All illustrations and images included in CareNotes® are the copyrighted property of [employer]  or [employer]  The above information is an  only  It is not intended as medical advice for individual conditions or treatments   Talk to your doctor, nurse or pharmacist before following any medical regimen to see if it is safe and effective for you

## 2018-03-29 ENCOUNTER — TELEPHONE (OUTPATIENT)
Dept: FAMILY MEDICINE CLINIC | Facility: CLINIC | Age: 43
End: 2018-03-29

## 2018-03-29 ENCOUNTER — HOSPITAL ENCOUNTER (OUTPATIENT)
Dept: RADIOLOGY | Facility: CLINIC | Age: 43
Discharge: HOME | End: 2018-03-29
Payer: COMMERCIAL

## 2018-03-29 DIAGNOSIS — R22.1 MASS IN NECK: ICD-10-CM

## 2018-03-29 PROCEDURE — 76536 US EXAM OF HEAD AND NECK: CPT

## 2018-03-29 NOTE — TELEPHONE ENCOUNTER
Called pt to discuss results  Pt has small nodules that look like nodes in the neck  Reviewed these findings  Advised as long as they clear we are ok   If not I would want to have them biopsied  Pt will be seeing me in about a month so we will get to look at them again      Nothing further to do with message

## 2018-05-14 ENCOUNTER — TRANSCRIBE ORDERS (OUTPATIENT)
Dept: ADMINISTRATIVE | Facility: HOSPITAL | Age: 43
End: 2018-05-14

## 2018-05-14 DIAGNOSIS — Z12.39 SCREENING BREAST EXAMINATION: ICD-10-CM

## 2018-05-14 DIAGNOSIS — N64.0 FISSURE OF NIPPLE: Primary | ICD-10-CM

## 2018-05-16 ENCOUNTER — HOSPITAL ENCOUNTER (OUTPATIENT)
Dept: ULTRASOUND IMAGING | Facility: CLINIC | Age: 43
Discharge: HOME | End: 2018-05-16
Payer: COMMERCIAL

## 2018-05-16 ENCOUNTER — HOSPITAL ENCOUNTER (OUTPATIENT)
Dept: MAMMOGRAPHY | Facility: CLINIC | Age: 43
Discharge: HOME | End: 2018-05-16
Payer: COMMERCIAL

## 2018-05-16 DIAGNOSIS — N63.0 BREAST LUMP: ICD-10-CM

## 2018-05-16 PROCEDURE — 77065 DX MAMMO INCL CAD UNI: CPT

## 2018-05-16 PROCEDURE — 76642 ULTRASOUND BREAST LIMITED: CPT

## 2018-05-16 PROCEDURE — G0279 TOMOSYNTHESIS, MAMMO: HCPCS

## 2018-08-03 ENCOUNTER — OFFICE VISIT (OUTPATIENT)
Dept: FAMILY MEDICINE CLINIC | Facility: CLINIC | Age: 43
End: 2018-08-03
Payer: COMMERCIAL

## 2018-08-03 VITALS
WEIGHT: 129 LBS | BODY MASS INDEX: 24.35 KG/M2 | HEIGHT: 61 IN | DIASTOLIC BLOOD PRESSURE: 88 MMHG | RESPIRATION RATE: 16 BRPM | HEART RATE: 80 BPM | SYSTOLIC BLOOD PRESSURE: 140 MMHG | TEMPERATURE: 99.2 F

## 2018-08-03 DIAGNOSIS — R21 RASH: ICD-10-CM

## 2018-08-03 DIAGNOSIS — J01.90 ACUTE SINUSITIS, RECURRENCE NOT SPECIFIED, UNSPECIFIED LOCATION: Primary | ICD-10-CM

## 2018-08-03 PROBLEM — D39.8: Status: ACTIVE | Noted: 2018-08-03

## 2018-08-03 PROBLEM — G40.209 COMPLEX PARTIAL SEIZURE WITH IMPAIRMENT OF CONSCIOUSNESS (HCC): Status: ACTIVE | Noted: 2017-05-23

## 2018-08-03 PROBLEM — A63.0 GENITAL WARTS: Status: ACTIVE | Noted: 2018-08-03

## 2018-08-03 PROBLEM — G43.719 INTRACTABLE CHRONIC MIGRAINE WITHOUT AURA: Status: ACTIVE | Noted: 2017-05-23

## 2018-08-03 PROBLEM — N92.0 POLYMENORRHEA: Status: ACTIVE | Noted: 2018-08-03

## 2018-08-03 PROBLEM — N63.0 BREAST LUMP: Status: ACTIVE | Noted: 2018-08-03

## 2018-08-03 PROBLEM — A63.0 CONDYLOMA ACUMINATUM OF VULVA: Status: ACTIVE | Noted: 2018-08-03

## 2018-08-03 PROBLEM — K64.9 HEMORRHOIDS: Status: ACTIVE | Noted: 2018-08-03

## 2018-08-03 PROBLEM — G44.41 INTRACTABLE HEADACHE CAUSED BY DRUG: Status: ACTIVE | Noted: 2017-05-23

## 2018-08-03 PROBLEM — N60.19 FIBROCYSTIC DISEASE OF BREAST: Status: ACTIVE | Noted: 2018-08-03

## 2018-08-03 PROBLEM — B00.9 HERPES SIMPLEX: Status: ACTIVE | Noted: 2018-08-03

## 2018-08-03 PROBLEM — N83.209 CYST OF OVARY: Status: ACTIVE | Noted: 2018-08-03

## 2018-08-03 PROBLEM — E88.81 METABOLIC SYNDROME X: Status: ACTIVE | Noted: 2018-08-03

## 2018-08-03 PROBLEM — A60.00 GENITAL HERPES SIMPLEX: Status: ACTIVE | Noted: 2018-08-03

## 2018-08-03 PROBLEM — E88.810 METABOLIC SYNDROME X: Status: ACTIVE | Noted: 2018-08-03

## 2018-08-03 PROBLEM — N92.6 IRREGULAR PERIODS: Status: ACTIVE | Noted: 2018-08-03

## 2018-08-03 PROBLEM — T50.905A INTRACTABLE HEADACHE CAUSED BY DRUG: Status: ACTIVE | Noted: 2017-05-23

## 2018-08-03 PROBLEM — N83.53 TORSION OF THE OVARY, OVARIAN PEDICLE OR FALLOPIAN TUBE: Status: ACTIVE | Noted: 2018-08-03

## 2018-08-03 PROBLEM — G40.909 EPILEPSY (HCC): Status: ACTIVE | Noted: 2018-08-03

## 2018-08-03 PROBLEM — K64.8 INTERNAL HEMORRHOIDS: Status: ACTIVE | Noted: 2018-08-03

## 2018-08-03 PROCEDURE — 99213 OFFICE O/P EST LOW 20 MIN: CPT | Performed by: NURSE PRACTITIONER

## 2018-08-03 RX ORDER — CLOTRIMAZOLE AND BETAMETHASONE DIPROPIONATE 10; .64 MG/G; MG/G
CREAM TOPICAL 2 TIMES DAILY
Qty: 30 G | Refills: 0 | Status: SHIPPED | OUTPATIENT
Start: 2018-08-03 | End: 2019-04-03 | Stop reason: ALTCHOICE

## 2018-08-03 RX ORDER — GUAIFENESIN AND CODEINE PHOSPHATE 100; 10 MG/5ML; MG/5ML
5 SOLUTION ORAL
Qty: 30 ML | Refills: 0 | Status: SHIPPED | OUTPATIENT
Start: 2018-08-03 | End: 2019-04-03 | Stop reason: ALTCHOICE

## 2018-08-03 RX ORDER — AZITHROMYCIN 250 MG/1
TABLET, FILM COATED ORAL
Qty: 6 TABLET | Refills: 0 | Status: SHIPPED | OUTPATIENT
Start: 2018-08-03 | End: 2018-08-08

## 2018-08-03 NOTE — PATIENT INSTRUCTIONS
Take medication with food  It is important that you take the entire course of antibiotics prescribed  May also take a probiotic of your choice to maintain healthy GI jason  Can take some probiotic and yogurt with the medication  Increase fluid intake, saline nasal rinses, and hot tea with honey and lemon  Cool air humidification can be helpful as well  May take Ibuprofen or Tylenol as needed for pain or fevers  Mucinex D for sinus congestion or Coricidin HBP if you have high blood pressure or a heart condition  Mucinex or Robitussin DM are effective for cough and chest congestion  Supportive care discussed and advised  Follow up for no improvement and worsening of conditions  Patient advised and educated when to see immediate medical care  Sinusitis   AMBULATORY CARE:   Sinusitis  is inflammation or infection of your sinuses  It is most often caused by a virus  Acute sinusitis may last up to 12 weeks  Chronic sinusitis lasts longer than 12 weeks  Recurrent sinusitis means you have 4 or more times in 1 year  Common symptoms include the following:   · Fever    · Pain, pressure, redness, or swelling around the forehead, cheeks, or eyes    · Thick yellow or green discharge from your nose    · Tenderness when you touch your face over your sinuses    · Dry cough that happens mostly at night or when you lie down    · Headache and face pain that is worse when you lean forward    · Tooth pain, or pain when you chew  Seek care immediately if:   · Your eye and eyelid are red, swollen, and painful  · You cannot open your eye  · You have vision changes, such as double vision  · Your eyeball bulges out or you cannot move your eye  · You are more sleepy than normal, or you notice changes in your ability to think, move, or talk  · You have a stiff neck, a fever, or a bad headache  · You have swelling of your forehead or scalp    Contact your healthcare provider if:   · Your symptoms do not improve after 3 days  · Your symptoms do not go away after 10 days  · You have nausea and are vomiting  · Your nose is bleeding  · You have questions or concerns about your condition or care  Treatment for sinusitis:  Your symptoms may go away on their own  Your healthcare provider may recommend watchful waiting for up to 10 days before starting antibiotics  You may  need any of the following:  · Acetaminophen  decreases pain and fever  It is available without a doctor's order  Ask how much to take and how often to take it  Follow directions  Read the labels of all other medicines you are using to see if they also contain acetaminophen, or ask your doctor or pharmacist  Acetaminophen can cause liver damage if not taken correctly  Do not use more than 4 grams (4,000 milligrams) total of acetaminophen in one day  · NSAIDs , such as ibuprofen, help decrease swelling, pain, and fever  This medicine is available with or without a doctor's order  NSAIDs can cause stomach bleeding or kidney problems in certain people  If you take blood thinner medicine, always ask your healthcare provider if NSAIDs are safe for you  Always read the medicine label and follow directions  · Nasal steroid sprays  may help decrease inflammation in your nose and sinuses  · Decongestants  help reduce swelling and drain mucus in the nose and sinuses  They may help you breathe easier  · Antihistamines  help dry mucus in the nose and relieve sneezing  · Antibiotics  help treat or prevent a bacterial infection  · Take your medicine as directed  Contact your healthcare provider if you think your medicine is not helping or if you have side effects  Tell him or her if you are allergic to any medicine  Keep a list of the medicines, vitamins, and herbs you take  Include the amounts, and when and why you take them  Bring the list or the pill bottles to follow-up visits   Carry your medicine list with you in case of an emergency  Self-care:   · Rinse your sinuses  Use a sinus rinse device to rinse your nasal passages with a saline (salt water) solution or distilled water  Do not use tap water  This will help thin the mucus in your nose and rinse away pollen and dirt  It will also help reduce swelling so you can breathe normally  Ask your healthcare provider how often to do this  · Breathe in steam   Heat a bowl of water until you see steam  Lean over the bowl and make a tent over your head with a large towel  Breathe deeply for about 20 minutes  Be careful not to get too close to the steam or burn yourself  Do this 3 times a day  You can also breathe deeply when you take a hot shower  · Sleep with your head elevated  Place an extra pillow under your head before you go to sleep to help your sinuses drain  · Drink liquids as directed  Ask your healthcare provider how much liquid to drink each day and which liquids are best for you  Liquids will thin the mucus in your nose and help it drain  Avoid drinks that contain alcohol or caffeine  · Do not smoke, and avoid secondhand smoke  Nicotine and other chemicals in cigarettes and cigars can make your symptoms worse  Ask your healthcare provider for information if you currently smoke and need help to quit  E-cigarettes or smokeless tobacco still contain nicotine  Talk to your healthcare provider before you use these products  Prevent the spread of germs that cause sinusitis:  Wash your hands often with soap and water  Wash your hands after you use the bathroom, change a child's diaper, or sneeze  Wash your hands before you prepare or eat food  Follow up with your healthcare provider as directed: You may be referred to an ear, nose, and throat specialist  Write down your questions so you remember to ask them during your visits     © 2017 [employer] Information is for End User's use only and may not be sold, redistributed or otherwise used for commercial purposes  All illustrations and images included in CareNotes® are the copyrighted property of [employer]  or [employer]rett [employer]  The above information is an  only  It is not intended as medical advice for individual conditions or treatments  Talk to your doctor, nurse or pharmacist before following any medical regimen to see if it is safe and effective for you

## 2018-08-03 NOTE — PROGRESS NOTES
Assessment/Plan:  Take medication with food  It is important that you take the entire course of antibiotics prescribed  May also take a probiotic of your choice to maintain healthy GI jason  Can take some probiotic and yogurt with the medication  Increase fluid intake, saline nasal rinses, and hot tea with honey and lemon  Cool air humidification can be helpful as well  May take Ibuprofen or Tylenol as needed for pain or fevers  Mucinex D for sinus congestion or Coricidin HBP if you have high blood pressure or a heart condition  Mucinex or Robitussin DM are effective for cough and chest congestion  Supportive care discussed and advised  Follow up for no improvement and worsening of conditions  Patient advised and educated when to see immediate medical care  Diagnoses and all orders for this visit:    Acute sinusitis, recurrence not specified, unspecified location  -     azithromycin (ZITHROMAX) 250 mg tablet; Take 500mg on day 1, 250mg on days 2-5  -     guaifenesin-codeine (GUAIFENESIN AC) 100-10 MG/5ML liquid; Take 5 mL by mouth daily at bedtime    Rash  -     clotrimazole-betamethasone (LOTRISONE) 1-0 05 % cream; Apply topically 2 (two) times a day    Other orders  -     Norethin-Eth Estrad-Fe Biphas (LO LOESTRIN FE) 1 MG-10 MCG / 10 MCG TABS; Lo Loestrin Fe 1 mg-10 mcg (24)/10 mcg (2) tablet          Return if symptoms worsen or fail to improve  Subjective:      Patient ID: Yonatan Emery is a 43 y o  female  Chief Complaint   Patient presents with    Cough     prcma    Nasal Congestion       HPI  Started with congestion and sinus pressure from a week and half and then progressed to low grade fever, cough and worsening of congestion  Has low grade temp 99 8  Denies chills, sob and chest pain  Stated that noticed redness on right upper groin area and used antibiotic and old steroid cream but still there and not itchy      The following portions of the patient's history were reviewed and updated as appropriate: allergies, current medications, past family history, past medical history, past social history, past surgical history and problem list       Review of Systems   Constitutional: Positive for fever  Negative for chills and fatigue  HENT: Positive for congestion  Negative for ear discharge, ear pain, facial swelling, hearing loss, mouth sores, nosebleeds, postnasal drip, rhinorrhea, sinus pain, sinus pressure, sneezing, sore throat, trouble swallowing and voice change  Respiratory: Positive for cough  Negative for chest tightness, shortness of breath and wheezing  Cardiovascular: Negative  Gastrointestinal: Negative for abdominal pain, constipation, diarrhea and nausea  Skin: Positive for rash  Neurological: Negative for dizziness, weakness, light-headedness and headaches  Psychiatric/Behavioral: Negative  Objective:    History   Smoking Status    Former Smoker   Smokeless Tobacco    Never Used       Allergies:    Allergies   Allergen Reactions    Ciprofloxacin     Phenytoin Rash     Reaction Date: [id number];        Vitals:  /88   Pulse 80   Temp 99 2 °F (37 3 °C)   Resp 16   Ht 5' 0 75" (1 543 m)   Wt 58 5 kg (129 lb)   LMP 07/13/2018 (Approximate)   BMI 24 58 kg/m²     Current Outpatient Prescriptions   Medication Sig Dispense Refill    carBAMazepine (CARBATROL) 300 MG 12 hr capsule TK 1 C PO BID  1    Norethin-Eth Estrad-Fe Biphas (LO LOESTRIN FE) 1 MG-10 MCG / 10 MCG TABS Lo Loestrin Fe 1 mg-10 mcg (24)/10 mcg (2) tablet      SUMAtriptan (IMITREX) 100 mg tablet Take 1 tablet (100 mg total) by mouth once as needed for migraine for up to 1 dose 15 tablet 3    valACYclovir (VALTREX) 500 mg tablet TK 1 T PO QD  2    azithromycin (ZITHROMAX) 250 mg tablet Take 500mg on day 1, 250mg on days 2-5 6 tablet 0    clotrimazole-betamethasone (LOTRISONE) 1-0 05 % cream Apply topically 2 (two) times a day 30 g 0    guaifenesin-codeine (GUAIFENESIN AC) 100-10 MG/5ML liquid Take 5 mL by mouth daily at bedtime 30 mL 0    propranolol (INDERAL) 10 mg tablet Take 1 tablet (10 mg total) by mouth 3 (three) times a day 90 tablet 3     No current facility-administered medications for this visit  Physical Exam   Constitutional: She is oriented to person, place, and time  She appears well-developed and well-nourished  HENT:   Head: Normocephalic  Right Ear: External ear and ear canal normal  Tympanic membrane is bulging  Left Ear: Tympanic membrane, external ear and ear canal normal    Nose: Mucosal edema present  Right sinus exhibits maxillary sinus tenderness and frontal sinus tenderness  Left sinus exhibits maxillary sinus tenderness and frontal sinus tenderness  Mouth/Throat: Oropharynx is clear and moist and mucous membranes are normal    Neck: Neck supple  Cardiovascular: Normal rate, regular rhythm and normal heart sounds  Pulmonary/Chest: Effort normal and breath sounds normal    Abdominal: Normal appearance and bowel sounds are normal  There is no hepatosplenomegaly  There is no tenderness  There is no rebound  Musculoskeletal: Normal range of motion  Lymphadenopathy:        Right cervical: No superficial cervical and no posterior cervical adenopathy present  Left cervical: No superficial cervical and no posterior cervical adenopathy present  Neurological: She is alert and oriented to person, place, and time  Skin: Skin is warm and dry  Psychiatric: She has a normal mood and affect  Her behavior is normal  Judgment and thought content normal    Vitals reviewed          GAGANDEEP Angel

## 2018-08-13 ENCOUNTER — OFFICE VISIT (OUTPATIENT)
Dept: FAMILY MEDICINE CLINIC | Facility: CLINIC | Age: 43
End: 2018-08-13
Payer: COMMERCIAL

## 2018-08-13 VITALS
HEIGHT: 61 IN | WEIGHT: 129 LBS | HEART RATE: 72 BPM | RESPIRATION RATE: 16 BRPM | BODY MASS INDEX: 24.35 KG/M2 | SYSTOLIC BLOOD PRESSURE: 124 MMHG | TEMPERATURE: 98.2 F | DIASTOLIC BLOOD PRESSURE: 84 MMHG

## 2018-08-13 DIAGNOSIS — G43.109 MIGRAINE WITH AURA AND WITHOUT STATUS MIGRAINOSUS, NOT INTRACTABLE: ICD-10-CM

## 2018-08-13 DIAGNOSIS — E78.2 HYPERLIPEMIA, MIXED: Primary | ICD-10-CM

## 2018-08-13 DIAGNOSIS — J06.9 VIRAL UPPER RESPIRATORY TRACT INFECTION: ICD-10-CM

## 2018-08-13 DIAGNOSIS — R59.1 LYMPHADENOPATHY OF HEAD AND NECK: ICD-10-CM

## 2018-08-13 PROCEDURE — 1036F TOBACCO NON-USER: CPT | Performed by: FAMILY MEDICINE

## 2018-08-13 PROCEDURE — 99214 OFFICE O/P EST MOD 30 MIN: CPT | Performed by: FAMILY MEDICINE

## 2018-08-13 PROCEDURE — 3008F BODY MASS INDEX DOCD: CPT | Performed by: FAMILY MEDICINE

## 2018-08-13 NOTE — PATIENT INSTRUCTIONS
Cholesterol and Your Health   AMBULATORY CARE:   Cholesterol  is a waxy, fat-like substance  Cholesterol is made by your body, but also comes from certain foods you eat  Your body uses cholesterol to make hormones and new cells  Your body also uses cholesterol to protect nerves  Cholesterol comes from foods such as meat and dairy products  Your total cholesterol level is made up by LDL cholesterol, HDL cholesterol, and triglycerides:  · LDL cholesterol  is called bad cholesterol  because it forms plaque in your arteries  As plaque builds up, your arteries become narrow, and less blood flows through  When plaque decreases blood flow to your heart, you may have chest pain  If plaque completely blocks an artery that bring blood to your heart, you may have a heart attack  Plaque can break off and form blood clots  Blood clots may block arteries in your brain and cause a stroke  · HDL cholesterol  is called good cholesterol  because it helps remove LDL cholesterol from your arteries  It does this by attaching to LDL cholesterol and carrying it to your liver  Your liver breaks down LDL cholesterol so your body can get rid of it  High levels of HDL cholesterol can help prevent a heart attack and stroke  Low levels of HDL cholesterol can increase your risk for heart disease, heart attack, and stroke  · Triglycerides  are a type of fat that store energy from foods you eat  High levels of triglycerides also cause plaque buildup  This can increase your risk for a heart attack or stroke  If your triglyceride level is high, your LDL cholesterol level may also be high  How food affects your cholesterol levels:   · Unhealthy fats  increase LDL cholesterol and triglyceride levels in your blood  They are found in foods high in cholesterol, saturated fat, and trans fat:     ¨ Cholesterol  is found in eggs, dairy, and meat  ¨ Saturated fat  is found in butter, cheese, ice cream, whole milk, and coconut oil  Saturated fat is also found in meat, such as sausage, hot dogs, and bologna  ¨ Trans fat  is found in liquid oils and is used in fried and baked foods  Foods that contain trans fats include chips, crackers, muffins, sweet rolls, microwave popcorn, and cookies  · Healthy fats,  also called unsaturated fats, help lower LDL cholesterol and triglyceride levels  Healthy fats include the following:     ¨ Monounsaturated fats  are found in foods such as olive oil, canola oil, avocado, nuts, and olives  ¨ Polyunsaturated fats,  such as omega 3 fats, are found in fish, such as salmon, trout, and tuna  They can also be found in plant foods such as flaxseed, walnuts, and soybeans  Other things that affect your cholesterol levels:   · Smoking cigarettes    · Being overweight or obese     · Drinking large amounts of alcohol    · Not enough exercise or no exercise    · Certain genes passed from your parents to you  What you need to know about having your cholesterol levels checked: Adults 21to 39years of age should have their cholesterol levels checked every 4 to 6 years  Adults 45 years and older should have their cholesterol checked every 1 to 2 years  You may need your cholesterol checked more often, or at a younger age, if you have risk factors for heart disease  You may also need to have your cholesterol checked more often if you have other health conditions, such as diabetes  Blood tests are used to check cholesterol levels  Blood tests measure your levels of triglycerides, LDL cholesterol, and HDL cholesterol  Cholesterol level goals: Your cholesterol level goal may depend on your risk for heart disease  It may also depend on your age and other health conditions  Ask your healthcare provider if the following goals are right for you:  · Your total cholesterol level  should be less than 200 mg/dL  This number may also depend on your HDL and LDL cholesterol goals       · Your LDL cholesterol level  should be less than 130 mg/dL  · Your HDL cholesterol level  should be 60 mg/dL or higher  · Your triglyceride level  should be less than 150 mg/dL  Treatment for high cholesterol:  Treatment for high cholesterol will also decrease your risk of heart disease, heart attack, and stroke  Treatment may include any of the following:  · Medicines  may be given to lower your LDL cholesterol, triglyceride levels, or total cholesterol level  You may need medicines to lower your cholesterol if any of the following is true:     ¨ You have a history of stroke, TIA, unstable angina, or a heart attack    ¨ Your LDL cholesterol level is 190 mg/dL or higher    ¨ You are age 36to 76years of age, have diabetes, and your LDL cholesterol is 70 mg/dL or higher    ¨ You are age 36to 76years of age, have risk factors for heart disease, and your LDL cholesterol is 70 mg/dL or higher    · Lifestyle changes  include changes to your diet, exercise, weight loss, and quitting smoking  It also includes decreasing the amount of alcohol you drink  · Supplements  include fish oil, red yeast rice, and garlic  Fish oil may help lower your triglyceride and LDL cholesterol levels  It may also increase your HDL cholesterol level  Red yeast rice may help decrease your total cholesterol level and LDL cholesterol level  Garlic may help lower your total cholesterol level  Do not take these supplements without talking to your healthcare provider  Nutrition to help lower your cholesterol levels:  A registered dietitian can help you create a healthy eating plan  Read food labels and choose foods low in saturated fat, trans fats, and cholesterol  · Decrease the total amount of fat you eat  Choose lean meats, fat-free or 1% fat milk, and low-fat dairy products, such as yogurt and cheese  Try to limit or avoid red meats  Limit or do not eat fried foods or baked goods such as cookies  · Replace unhealthy fats with healthy fats    Cook foods in olive oil or canola oil  Choose soft margarines that are low in saturated fat and trans fat  Seeds, nuts, and avocados are other examples of healthy fats  · Eat foods with omega-3 fats  Examples include salmon, tuna, mackerel, walnuts, and flaxseed  Eat fish 2 times per week  Children and pregnant women should not eat fish that have high levels of mercury, such as shark, swordfish, and flower mackerel  · Increase the amount of plant-based foods you eat  Plant-based foods are low in cholesterol and fat  Eating more of these foods may help lower your cholesterol and help you lose weight  Examples of plant-based foods includes fruits, vegetables, legumes, and whole grains  Replace milk that contains dairy with almond, soy, or coconut milk  Eat beans and foods with soy for protein instead of meat  Ask your healthcare provider or dietitian for more information on plant-based foods  · Increase the amount of fiber you eat  High-fiber foods can help lower your LDL cholesterol  You should eat between 20 and 30 grams of fiber each day  Eat at least 5 servings of fruits and vegetables each day  Other examples of high-fiber foods include whole-grain or whole-wheat breads, pastas, or cereals, and brown rice  Eat 3 ounces of whole-grain foods each day  Increase fiber slowly  You may have abdominal discomfort, bloating, and gas if you add fiber to your diet too quickly  Lifestyle changes you can make to help lower your cholesterol levels:   · Maintain a healthy weight  Ask your healthcare provider how much you should weigh  Ask him or her to help you create a weight loss plan if you are overweight  Weight loss can decrease your total cholesterol and triglyceride levels  · Exercise regularly  Exercise can help lower your total cholesterol level and maintain a healthy weight  Exercise can also help increase your HDL cholesterol level   Work with your healthcare provider to create an exercise program that is right for you  Get at least 30 minutes of moderate exercise most days of the week  Examples of exercise include brisk walking, swimming, or biking  · Do not smoke  Nicotine and other chemicals in cigarettes and cigars can damage your lungs, heart, and blood vessels  They can also raise your triglyceride levels  Ask your healthcare provider for information if you currently smoke and need help to quit  E-cigarettes or smokeless tobacco still contain nicotine  Talk to your healthcare provider before you use these products  · Limit or do not drink alcohol  Alcohol can increase your triglyceride levels  Ask your healthcare provider if it is safe for you to drink alcohol  Also ask how much is safe for you to drink each day  © 2017 [address] [facility] Information is for End User's use only and may not be sold, redistributed or otherwise used for commercial purposes  All illustrations and images included in CareNotes® are the copyrighted property of [employer]  or Everett Valencia  The above information is an  only  It is not intended as medical advice for individual conditions or treatments  Talk to your doctor, nurse or pharmacist before following any medical regimen to see if it is safe and effective for you

## 2018-08-13 NOTE — PROGRESS NOTES
Assessment/Plan:    Problem List Items Addressed This Visit     Classic migraine with aura    Hyperlipemia, mixed - Primary     Pt advised on 6 months of diet and exercise and then redraw         Relevant Orders    Lipid Panel with Direct LDL reflex    Comprehensive metabolic panel    Viral upper respiratory tract infection      Other Visit Diagnoses     Lymphadenopathy of head and neck        Relevant Orders    Ambulatory Referral to Otolaryngology      I feel the node on the left neck feels gone and being she is sick now has one on rt side  Seems reactive  She wants to see ent    Patient Instructions     Cholesterol and Your Health   AMBULATORY CARE:   Cholesterol  is a waxy, fat-like substance  Cholesterol is made by your body, but also comes from certain foods you eat  Your body uses cholesterol to make hormones and new cells  Your body also uses cholesterol to protect nerves  Cholesterol comes from foods such as meat and dairy products  Your total cholesterol level is made up by LDL cholesterol, HDL cholesterol, and triglycerides:  · LDL cholesterol  is called bad cholesterol  because it forms plaque in your arteries  As plaque builds up, your arteries become narrow, and less blood flows through  When plaque decreases blood flow to your heart, you may have chest pain  If plaque completely blocks an artery that bring blood to your heart, you may have a heart attack  Plaque can break off and form blood clots  Blood clots may block arteries in your brain and cause a stroke  · HDL cholesterol  is called good cholesterol  because it helps remove LDL cholesterol from your arteries  It does this by attaching to LDL cholesterol and carrying it to your liver  Your liver breaks down LDL cholesterol so your body can get rid of it  High levels of HDL cholesterol can help prevent a heart attack and stroke  Low levels of HDL cholesterol can increase your risk for heart disease, heart attack, and stroke  · Triglycerides  are a type of fat that store energy from foods you eat  High levels of triglycerides also cause plaque buildup  This can increase your risk for a heart attack or stroke  If your triglyceride level is high, your LDL cholesterol level may also be high  How food affects your cholesterol levels:   · Unhealthy fats  increase LDL cholesterol and triglyceride levels in your blood  They are found in foods high in cholesterol, saturated fat, and trans fat:     ¨ Cholesterol  is found in eggs, dairy, and meat  ¨ Saturated fat  is found in butter, cheese, ice cream, whole milk, and coconut oil  Saturated fat is also found in meat, such as sausage, hot dogs, and bologna  ¨ Trans fat  is found in liquid oils and is used in fried and baked foods  Foods that contain trans fats include chips, crackers, muffins, sweet rolls, microwave popcorn, and cookies  · Healthy fats,  also called unsaturated fats, help lower LDL cholesterol and triglyceride levels  Healthy fats include the following:     ¨ Monounsaturated fats  are found in foods such as olive oil, canola oil, avocado, nuts, and olives  ¨ Polyunsaturated fats,  such as omega 3 fats, are found in fish, such as salmon, trout, and tuna  They can also be found in plant foods such as flaxseed, walnuts, and soybeans  Other things that affect your cholesterol levels:   · Smoking cigarettes    · Being overweight or obese     · Drinking large amounts of alcohol    · Not enough exercise or no exercise    · Certain genes passed from your parents to you  What you need to know about having your cholesterol levels checked: Adults 21to 39years of age should have their cholesterol levels checked every 4 to 6 years  Adults 45 years and older should have their cholesterol checked every 1 to 2 years  You may need your cholesterol checked more often, or at a younger age, if you have risk factors for heart disease   You may also need to have your cholesterol checked more often if you have other health conditions, such as diabetes  Blood tests are used to check cholesterol levels  Blood tests measure your levels of triglycerides, LDL cholesterol, and HDL cholesterol  Cholesterol level goals: Your cholesterol level goal may depend on your risk for heart disease  It may also depend on your age and other health conditions  Ask your healthcare provider if the following goals are right for you:  · Your total cholesterol level  should be less than 200 mg/dL  This number may also depend on your HDL and LDL cholesterol goals  · Your LDL cholesterol level  should be less than 130 mg/dL  · Your HDL cholesterol level  should be 60 mg/dL or higher  · Your triglyceride level  should be less than 150 mg/dL  Treatment for high cholesterol:  Treatment for high cholesterol will also decrease your risk of heart disease, heart attack, and stroke  Treatment may include any of the following:  · Medicines  may be given to lower your LDL cholesterol, triglyceride levels, or total cholesterol level  You may need medicines to lower your cholesterol if any of the following is true:     ¨ You have a history of stroke, TIA, unstable angina, or a heart attack    ¨ Your LDL cholesterol level is 190 mg/dL or higher    ¨ You are age 36to 76years of age, have diabetes, and your LDL cholesterol is 70 mg/dL or higher    ¨ You are age 36to 76years of age, have risk factors for heart disease, and your LDL cholesterol is 70 mg/dL or higher    · Lifestyle changes  include changes to your diet, exercise, weight loss, and quitting smoking  It also includes decreasing the amount of alcohol you drink  · Supplements  include fish oil, red yeast rice, and garlic  Fish oil may help lower your triglyceride and LDL cholesterol levels  It may also increase your HDL cholesterol level  Red yeast rice may help decrease your total cholesterol level and LDL cholesterol level   Garlic may help lower your total cholesterol level  Do not take these supplements without talking to your healthcare provider  Nutrition to help lower your cholesterol levels:  A registered dietitian can help you create a healthy eating plan  Read food labels and choose foods low in saturated fat, trans fats, and cholesterol  · Decrease the total amount of fat you eat  Choose lean meats, fat-free or 1% fat milk, and low-fat dairy products, such as yogurt and cheese  Try to limit or avoid red meats  Limit or do not eat fried foods or baked goods such as cookies  · Replace unhealthy fats with healthy fats  Cook foods in olive oil or canola oil  Choose soft margarines that are low in saturated fat and trans fat  Seeds, nuts, and avocados are other examples of healthy fats  · Eat foods with omega-3 fats  Examples include salmon, tuna, mackerel, walnuts, and flaxseed  Eat fish 2 times per week  Children and pregnant women should not eat fish that have high levels of mercury, such as shark, swordfish, and flower mackerel  · Increase the amount of plant-based foods you eat  Plant-based foods are low in cholesterol and fat  Eating more of these foods may help lower your cholesterol and help you lose weight  Examples of plant-based foods includes fruits, vegetables, legumes, and whole grains  Replace milk that contains dairy with almond, soy, or coconut milk  Eat beans and foods with soy for protein instead of meat  Ask your healthcare provider or dietitian for more information on plant-based foods  · Increase the amount of fiber you eat  High-fiber foods can help lower your LDL cholesterol  You should eat between 20 and 30 grams of fiber each day  Eat at least 5 servings of fruits and vegetables each day  Other examples of high-fiber foods include whole-grain or whole-wheat breads, pastas, or cereals, and brown rice  Eat 3 ounces of whole-grain foods each day  Increase fiber slowly   You may have abdominal discomfort, bloating, and gas if you add fiber to your diet too quickly  Lifestyle changes you can make to help lower your cholesterol levels:   · Maintain a healthy weight  Ask your healthcare provider how much you should weigh  Ask him or her to help you create a weight loss plan if you are overweight  Weight loss can decrease your total cholesterol and triglyceride levels  · Exercise regularly  Exercise can help lower your total cholesterol level and maintain a healthy weight  Exercise can also help increase your HDL cholesterol level  Work with your healthcare provider to create an exercise program that is right for you  Get at least 30 minutes of moderate exercise most days of the week  Examples of exercise include brisk walking, swimming, or biking  · Do not smoke  Nicotine and other chemicals in cigarettes and cigars can damage your lungs, heart, and blood vessels  They can also raise your triglyceride levels  Ask your healthcare provider for information if you currently smoke and need help to quit  E-cigarettes or smokeless tobacco still contain nicotine  Talk to your healthcare provider before you use these products  · Limit or do not drink alcohol  Alcohol can increase your triglyceride levels  Ask your healthcare provider if it is safe for you to drink alcohol  Also ask how much is safe for you to drink each day  © 2017 [address] [facility] Information is for End User's use only and may not be sold, redistributed or otherwise used for commercial purposes  All illustrations and images included in CareNotes® are the copyrighted property of [employer]  or Everett Valencia  The above information is an  only  It is not intended as medical advice for individual conditions or treatments  Talk to your doctor, nurse or pharmacist before following any medical regimen to see if it is safe and effective for you          Return in about 6 months (around 2/13/2019) for Recheck  Subjective:      Patient ID: Basil Amezquita is a 43 y o  female  Chief Complaint   Patient presents with    Results     Review lab work results today Lena Avalos DALJIT       Pt is here to follow up labs  Pt states she had US in her neck would like to review the results    Pt states she was going to start taking inderall,  She filled it but did not start it yet  Pt states she took a med in her youth that made her depressed and anxious and that is why she did not take it    Pt states she was here last week with a sinus infection    Pt states she would like to see ent to have the nodes checked in her neck        The following portions of the patient's history were reviewed and updated as appropriate: allergies, current medications, past family history, past medical history, past social history, past surgical history and problem list     Review of Systems   Constitutional: Negative  Negative for activity change, appetite change, chills, diaphoresis and fatigue  HENT: Negative  Negative for dental problem, ear pain, sinus pressure and sore throat  Eyes: Negative  Negative for photophobia, pain, discharge, redness, itching and visual disturbance  Respiratory: Positive for cough  Negative for apnea and chest tightness  Cardiovascular: Negative  Negative for chest pain, palpitations and leg swelling  Gastrointestinal: Negative  Negative for abdominal distention, abdominal pain, constipation and diarrhea  Endocrine: Negative  Negative for cold intolerance and heat intolerance  Genitourinary: Negative  Negative for difficulty urinating and dyspareunia  Musculoskeletal: Negative  Negative for arthralgias and back pain  Skin: Negative  Allergic/Immunologic: Negative for environmental allergies  Neurological: Negative  Negative for dizziness  Psychiatric/Behavioral: Negative  Negative for agitation           Current Outpatient Prescriptions   Medication Sig Dispense Refill    carBAMazepine (CARBATROL) 300 MG 12 hr capsule TK 1 C PO BID  1    clotrimazole-betamethasone (LOTRISONE) 1-0 05 % cream Apply topically 2 (two) times a day 30 g 0    Norethin-Eth Estrad-Fe Biphas (LO LOESTRIN FE) 1 MG-10 MCG / 10 MCG TABS Lo Loestrin Fe 1 mg-10 mcg (24)/10 mcg (2) tablet      SUMAtriptan (IMITREX) 100 mg tablet Take 1 tablet (100 mg total) by mouth once as needed for migraine for up to 1 dose 15 tablet 3    valACYclovir (VALTREX) 500 mg tablet TK 1 T PO QD  2    guaifenesin-codeine (GUAIFENESIN AC) 100-10 MG/5ML liquid Take 5 mL by mouth daily at bedtime 30 mL 0    propranolol (INDERAL) 10 mg tablet Take 1 tablet (10 mg total) by mouth 3 (three) times a day 90 tablet 3     No current facility-administered medications for this visit  Objective:    /84   Pulse 72   Temp 98 2 °F (36 8 °C)   Resp 16   Ht 5' 0 75" (1 543 m)   Wt 58 5 kg (129 lb)   LMP 08/05/2018 (Exact Date)   BMI 24 58 kg/m²        Physical Exam   Lymphadenopathy:     She has cervical adenopathy                Maricarmen Alvarez DO

## 2018-09-12 DIAGNOSIS — G43.109 MIGRAINE WITH AURA AND WITHOUT STATUS MIGRAINOSUS, NOT INTRACTABLE: ICD-10-CM

## 2018-09-12 RX ORDER — SUMATRIPTAN 100 MG/1
100 TABLET, FILM COATED ORAL ONCE AS NEEDED
Qty: 15 TABLET | Refills: 0 | Status: SHIPPED | OUTPATIENT
Start: 2018-09-12 | End: 2018-10-24 | Stop reason: SDUPTHER

## 2018-10-16 ENCOUNTER — TRANSCRIBE ORDERS (OUTPATIENT)
Dept: ADMINISTRATIVE | Facility: HOSPITAL | Age: 43
End: 2018-10-16

## 2018-10-16 DIAGNOSIS — N94.89 PELVIC CONGESTION SYNDROME: Primary | ICD-10-CM

## 2018-10-18 ENCOUNTER — HOSPITAL ENCOUNTER (OUTPATIENT)
Dept: RADIOLOGY | Facility: CLINIC | Age: 43
Discharge: HOME | End: 2018-10-18
Payer: COMMERCIAL

## 2018-10-18 DIAGNOSIS — N94.89 PELVIC CONGESTION SYNDROME: ICD-10-CM

## 2018-10-18 PROCEDURE — 76856 US EXAM PELVIC COMPLETE: CPT

## 2018-10-18 PROCEDURE — 76830 TRANSVAGINAL US NON-OB: CPT

## 2018-10-24 DIAGNOSIS — G43.109 MIGRAINE WITH AURA AND WITHOUT STATUS MIGRAINOSUS, NOT INTRACTABLE: ICD-10-CM

## 2018-10-24 RX ORDER — SUMATRIPTAN 100 MG/1
100 TABLET, FILM COATED ORAL ONCE AS NEEDED
Qty: 15 TABLET | Refills: 3 | Status: SHIPPED | OUTPATIENT
Start: 2018-10-24 | End: 2018-11-29 | Stop reason: SDUPTHER

## 2018-11-29 DIAGNOSIS — G43.109 MIGRAINE WITH AURA AND WITHOUT STATUS MIGRAINOSUS, NOT INTRACTABLE: ICD-10-CM

## 2018-11-29 RX ORDER — SUMATRIPTAN 100 MG/1
100 TABLET, FILM COATED ORAL ONCE AS NEEDED
Qty: 15 TABLET | Refills: 0 | Status: SHIPPED | OUTPATIENT
Start: 2018-11-29 | End: 2019-04-03 | Stop reason: SDUPTHER

## 2019-03-25 LAB
ALBUMIN SERPL-MCNC: 4.1 G/DL (ref 3.5–5.5)
ALBUMIN/GLOB SERPL: 2.1 {RATIO} (ref 1.2–2.2)
ALP SERPL-CCNC: 81 IU/L (ref 39–117)
ALT SERPL-CCNC: 17 IU/L (ref 0–32)
AST SERPL-CCNC: 18 IU/L (ref 0–40)
BILIRUB SERPL-MCNC: 0.2 MG/DL (ref 0–1.2)
BUN SERPL-MCNC: 16 MG/DL (ref 6–24)
BUN/CREAT SERPL: 20 (ref 9–23)
CALCIUM SERPL-MCNC: 8.9 MG/DL (ref 8.7–10.2)
CHLORIDE SERPL-SCNC: 104 MMOL/L (ref 96–106)
CHOLEST SERPL-MCNC: 210 MG/DL (ref 100–199)
CO2 SERPL-SCNC: 20 MMOL/L (ref 20–29)
CREAT SERPL-MCNC: 0.81 MG/DL (ref 0.57–1)
GLOBULIN SER-MCNC: 2 G/DL (ref 1.5–4.5)
GLUCOSE SERPL-MCNC: 86 MG/DL (ref 65–99)
HDLC SERPL-MCNC: 86 MG/DL
LABCORP COMMENT: NORMAL
LDLC SERPL CALC-MCNC: 110 MG/DL (ref 0–99)
MICRODELETION SYND BLD/T FISH: NORMAL
POTASSIUM SERPL-SCNC: 4.3 MMOL/L (ref 3.5–5.2)
PROT SERPL-MCNC: 6.1 G/DL (ref 6–8.5)
SL AMB EGFR AFRICAN AMERICAN: 103 ML/MIN/1.73
SL AMB EGFR NON AFRICAN AMERICAN: 89 ML/MIN/1.73
SODIUM SERPL-SCNC: 141 MMOL/L (ref 134–144)
TRIGL SERPL-MCNC: 70 MG/DL (ref 0–149)
TSH SERPL DL<=0.005 MIU/L-ACNC: 1.41 UIU/ML (ref 0.45–4.5)

## 2019-04-03 ENCOUNTER — OFFICE VISIT (OUTPATIENT)
Dept: FAMILY MEDICINE CLINIC | Facility: CLINIC | Age: 44
End: 2019-04-03
Payer: COMMERCIAL

## 2019-04-03 VITALS
DIASTOLIC BLOOD PRESSURE: 84 MMHG | TEMPERATURE: 98.7 F | BODY MASS INDEX: 24.73 KG/M2 | HEART RATE: 82 BPM | HEIGHT: 61 IN | RESPIRATION RATE: 16 BRPM | SYSTOLIC BLOOD PRESSURE: 122 MMHG | WEIGHT: 131 LBS

## 2019-04-03 DIAGNOSIS — E78.2 HYPERLIPEMIA, MIXED: Primary | ICD-10-CM

## 2019-04-03 DIAGNOSIS — G43.109 MIGRAINE WITH AURA AND WITHOUT STATUS MIGRAINOSUS, NOT INTRACTABLE: ICD-10-CM

## 2019-04-03 PROCEDURE — 99213 OFFICE O/P EST LOW 20 MIN: CPT | Performed by: FAMILY MEDICINE

## 2019-04-03 PROCEDURE — 1036F TOBACCO NON-USER: CPT | Performed by: FAMILY MEDICINE

## 2019-04-03 PROCEDURE — 3008F BODY MASS INDEX DOCD: CPT | Performed by: FAMILY MEDICINE

## 2019-04-03 RX ORDER — SUMATRIPTAN 100 MG/1
100 TABLET, FILM COATED ORAL ONCE AS NEEDED
Qty: 15 TABLET | Refills: 5 | Status: SHIPPED | OUTPATIENT
Start: 2019-04-03 | End: 2019-12-11 | Stop reason: SDUPTHER

## 2019-07-26 ENCOUNTER — OFFICE VISIT (OUTPATIENT)
Dept: FAMILY MEDICINE CLINIC | Facility: CLINIC | Age: 44
End: 2019-07-26
Payer: COMMERCIAL

## 2019-07-26 VITALS
DIASTOLIC BLOOD PRESSURE: 92 MMHG | HEIGHT: 61 IN | RESPIRATION RATE: 16 BRPM | WEIGHT: 129 LBS | SYSTOLIC BLOOD PRESSURE: 130 MMHG | HEART RATE: 100 BPM | TEMPERATURE: 98 F | BODY MASS INDEX: 24.35 KG/M2

## 2019-07-26 DIAGNOSIS — M54.50 ACUTE BILATERAL LOW BACK PAIN WITHOUT SCIATICA: ICD-10-CM

## 2019-07-26 DIAGNOSIS — R14.0 ABDOMINAL BLOATING: Primary | ICD-10-CM

## 2019-07-26 DIAGNOSIS — R03.0 ELEVATED BP WITHOUT DIAGNOSIS OF HYPERTENSION: ICD-10-CM

## 2019-07-26 DIAGNOSIS — R19.4 CHANGE IN BOWEL HABITS: ICD-10-CM

## 2019-07-26 LAB
SL AMB  POCT GLUCOSE, UA: NORMAL
SL AMB LEUKOCYTE ESTERASE,UA: NORMAL
SL AMB POCT BILIRUBIN,UA: NORMAL
SL AMB POCT BLOOD,UA: 50
SL AMB POCT CLARITY,UA: CLEAR
SL AMB POCT COLOR,UA: YELLOW
SL AMB POCT KETONES,UA: NORMAL
SL AMB POCT NITRITE,UA: NORMAL
SL AMB POCT PH,UA: 5
SL AMB POCT SPECIFIC GRAVITY,UA: 1005
SL AMB POCT URINE PROTEIN: NORMAL
SL AMB POCT UROBILINOGEN: NORMAL

## 2019-07-26 PROCEDURE — 99214 OFFICE O/P EST MOD 30 MIN: CPT | Performed by: NURSE PRACTITIONER

## 2019-07-26 PROCEDURE — 81003 URINALYSIS AUTO W/O SCOPE: CPT | Performed by: NURSE PRACTITIONER

## 2019-07-26 RX ORDER — CYCLOBENZAPRINE HCL 5 MG
5 TABLET ORAL
Qty: 20 TABLET | Refills: 0 | Status: SHIPPED | OUTPATIENT
Start: 2019-07-26 | End: 2019-09-16

## 2019-07-26 NOTE — PROGRESS NOTES
Assessment/Plan:  Urine dip is negative  CT scan ordered and will follow up with GI  Will follow up with gynecologist   Informed about BP elevated on one arm and patient does not want to start any medication and will follow in 3 days in office and strict ER precautions advised and informed headache is one of the symptoms of elevated BP  Advised bentyl and refusing  Will start probiotic       Diagnoses and all orders for this visit:    Abdominal bloating  -     CT abdomen pelvis w wo contrast; Future  -     Ambulatory referral to Gastroenterology; Future  -     Cancel: POCT urine dip auto scope  -     POCT urine dip auto non-scope    Change in bowel habits  -     CT abdomen pelvis w wo contrast; Future  -     Ambulatory referral to Gastroenterology; Future  -     Cancel: POCT urine dip auto scope  -     POCT urine dip auto non-scope    Acute bilateral low back pain without sciatica  -     cyclobenzaprine (FLEXERIL) 5 mg tablet; Take 1 tablet (5 mg total) by mouth daily at bedtime for 20 days  -     POCT urine dip auto non-scope    Elevated BP without diagnosis of hypertension  Comments:  follow up in 3 days and strict ER precautions advised          BMI Counseling: Body mass index is 24 58 kg/m²  Discussed the patient's BMI with her  Patient Instructions:  Supportive care discussed and advised  Advised to RTO for any worsening and no improvement  Follow up for no improvement and worsening of conditions  Patient advised and educated when to see immediate medical care  Return if symptoms worsen or fail to improve  Future Appointments   Date Time Provider Ramiro Rico   7/30/2019 11:45 AM DO DARRICK Elias [facility]   9/13/2019  8:20 AM Arleen Morgan MD [facility]           Subjective:      Patient ID: Maggie Kelly is a 37 y o  female      Chief Complaint   Patient presents with    Intestinal issues     bloated, diarrhea-wmcma    Back Pain     low back       HPI  Patient stated that having diarrhea on and off from 3 months with abdominal bloating  Stated that having mild abdomina carping at times when having diarrhea  Denies any abnormal vaginal bleeding and currently going through her menstrual cycle  As per patient gained 5 pounds in a week  Stated that has h/o migraines and had migraine on 7/24 and vomited at night time and next day woke up with bilateral lower back ache  Denies any family h/o aneurysms  Stated that grandmother had colon cancer in 66's and couple people have inflammatory bowel disease in family  Denies any vision changes, weakness of extremities, chest pain and sob  Has h/o ovarian cyst and has follow up with Dr Johnathon Perez on 8/6/2019  Vitals:  /92 (BP Location: Left arm)   Pulse 100   Temp 98 °F (36 7 °C)   Resp 16   Ht 5' 0 75" (1 543 m)   Wt 58 5 kg (129 lb)   LMP 07/21/2019   BMI 24 58 kg/m²     The following portions of the patient's history were reviewed and updated as appropriate: allergies, current medications, past family history, past medical history, past social history, past surgical history and problem list       Review of Systems   Constitutional: Negative  Negative for appetite change, chills, fever and unexpected weight change  HENT: Negative  Respiratory: Negative  Cardiovascular: Negative  Gastrointestinal: Positive for diarrhea  Negative for abdominal pain, anal bleeding, blood in stool, constipation, nausea, rectal pain and vomiting  As noted in HPI     Genitourinary: Negative  Musculoskeletal: Positive for back pain  Negative for arthralgias, gait problem, joint swelling, myalgias, neck pain and neck stiffness  Skin: Negative  Neurological: Negative  Psychiatric/Behavioral: Negative  Objective:    Social History     Tobacco Use   Smoking Status Former Smoker   Smokeless Tobacco Never Used       Allergies:    Allergies   Allergen Reactions    Ciprofloxacin     Phenytoin Rash Reaction Date: 05Oct2010;          Current Outpatient Medications   Medication Sig Dispense Refill    carBAMazepine (CARBATROL) 300 MG 12 hr capsule TK 1 C PO BID  1    SUMAtriptan (IMITREX) 100 mg tablet Take 1 tablet (100 mg total) by mouth once as needed for migraine for up to 1 dose 15 tablet 5    valACYclovir (VALTREX) 500 mg tablet TK 1 T PO QD  2    cyclobenzaprine (FLEXERIL) 5 mg tablet Take 1 tablet (5 mg total) by mouth daily at bedtime for 20 days 20 tablet 0     No current facility-administered medications for this visit  Physical Exam   Constitutional: She is oriented to person, place, and time  She appears well-developed and well-nourished  Cardiovascular: Normal rate, regular rhythm and normal heart sounds  Pulmonary/Chest: Effort normal and breath sounds normal    Abdominal: Soft  Bowel sounds are normal  There is no tenderness  There is no CVA tenderness  Musculoskeletal: Normal range of motion  She exhibits tenderness (tender on palpation on bilateral lumbar paraspinal musculature)  She exhibits no edema  Neurological: She is alert and oriented to person, place, and time  Psychiatric: She has a normal mood and affect  Her behavior is normal  Judgment and thought content normal    Vitals reviewed              Recent Results (from the past 24 hour(s))   POCT urine dip auto non-scope    Collection Time: 07/26/19 11:28 AM   Result Value Ref Range     COLOR,UA Yellow     CLARITY,UA clear     SPECIFIC GRAVITY,UA 1,005      PH,UA 5     LEUKOCYTE ESTERASE,UA -     NITRITE,UA -     GLUCOSE, UA -     Tamme 63 50(mensturation)      POCT URINE PROTEIN -     SL AMB POCT UROBILINOGEN -            GAGANDEEP Ruiz

## 2019-07-27 ENCOUNTER — HOSPITAL ENCOUNTER (EMERGENCY)
Facility: HOSPITAL | Age: 44
Discharge: HOME | End: 2019-07-28
Attending: EMERGENCY MEDICINE | Admitting: EMERGENCY MEDICINE
Payer: COMMERCIAL

## 2019-07-27 DIAGNOSIS — M54.9 BACK PAIN: ICD-10-CM

## 2019-07-27 DIAGNOSIS — M54.40 BACK PAIN OF LUMBAR REGION WITH SCIATICA: Primary | ICD-10-CM

## 2019-07-27 DIAGNOSIS — R10.9 ABDOMINAL PAIN: ICD-10-CM

## 2019-07-27 PROCEDURE — 99284 EMERGENCY DEPT VISIT MOD MDM: CPT

## 2019-07-27 PROCEDURE — 99284 EMERGENCY DEPT VISIT MOD MDM: CPT | Performed by: EMERGENCY MEDICINE

## 2019-07-28 ENCOUNTER — APPOINTMENT (EMERGENCY)
Dept: CT IMAGING | Facility: HOSPITAL | Age: 44
End: 2019-07-28
Payer: COMMERCIAL

## 2019-07-28 VITALS
DIASTOLIC BLOOD PRESSURE: 74 MMHG | HEART RATE: 76 BPM | RESPIRATION RATE: 16 BRPM | TEMPERATURE: 98.1 F | OXYGEN SATURATION: 97 % | SYSTOLIC BLOOD PRESSURE: 111 MMHG

## 2019-07-28 LAB
ALBUMIN SERPL BCP-MCNC: 3.7 G/DL (ref 3.5–5)
ALP SERPL-CCNC: 100 U/L (ref 46–116)
ALT SERPL W P-5'-P-CCNC: 20 U/L (ref 12–78)
ANION GAP SERPL CALCULATED.3IONS-SCNC: 7 MMOL/L (ref 4–13)
AST SERPL W P-5'-P-CCNC: 15 U/L (ref 5–45)
BASOPHILS # BLD AUTO: 0.07 THOUSANDS/ΜL (ref 0–0.1)
BASOPHILS NFR BLD AUTO: 1 % (ref 0–1)
BILIRUB SERPL-MCNC: 0.3 MG/DL (ref 0.2–1)
BILIRUB UR QL STRIP: NEGATIVE
BUN SERPL-MCNC: 20 MG/DL (ref 5–25)
CALCIUM SERPL-MCNC: 8.2 MG/DL (ref 8.3–10.1)
CHLORIDE SERPL-SCNC: 103 MMOL/L (ref 100–108)
CLARITY UR: CLEAR
CO2 SERPL-SCNC: 30 MMOL/L (ref 21–32)
COLOR UR: YELLOW
CREAT SERPL-MCNC: 0.75 MG/DL (ref 0.6–1.3)
EOSINOPHIL # BLD AUTO: 0.06 THOUSAND/ΜL (ref 0–0.61)
EOSINOPHIL NFR BLD AUTO: 1 % (ref 0–6)
ERYTHROCYTE [DISTWIDTH] IN BLOOD BY AUTOMATED COUNT: 12.2 % (ref 11.6–15.1)
EXT PREG TEST URINE: NEGATIVE
EXT. CONTROL ED NAV: NORMAL
GFR SERPL CREATININE-BSD FRML MDRD: 98 ML/MIN/1.73SQ M
GLUCOSE SERPL-MCNC: 102 MG/DL (ref 65–140)
GLUCOSE UR STRIP-MCNC: NEGATIVE MG/DL
HCT VFR BLD AUTO: 39.9 % (ref 34.8–46.1)
HGB BLD-MCNC: 13.1 G/DL (ref 11.5–15.4)
HGB UR QL STRIP.AUTO: NEGATIVE
IMM GRANULOCYTES # BLD AUTO: 0.02 THOUSAND/UL (ref 0–0.2)
IMM GRANULOCYTES NFR BLD AUTO: 0 % (ref 0–2)
KETONES UR STRIP-MCNC: NEGATIVE MG/DL
LEUKOCYTE ESTERASE UR QL STRIP: NEGATIVE
LIPASE SERPL-CCNC: 128 U/L (ref 73–393)
LYMPHOCYTES # BLD AUTO: 2.69 THOUSANDS/ΜL (ref 0.6–4.47)
LYMPHOCYTES NFR BLD AUTO: 32 % (ref 14–44)
MCH RBC QN AUTO: 32.3 PG (ref 26.8–34.3)
MCHC RBC AUTO-ENTMCNC: 32.8 G/DL (ref 31.4–37.4)
MCV RBC AUTO: 98 FL (ref 82–98)
MONOCYTES # BLD AUTO: 0.73 THOUSAND/ΜL (ref 0.17–1.22)
MONOCYTES NFR BLD AUTO: 9 % (ref 4–12)
NEUTROPHILS # BLD AUTO: 4.83 THOUSANDS/ΜL (ref 1.85–7.62)
NEUTS SEG NFR BLD AUTO: 57 % (ref 43–75)
NITRITE UR QL STRIP: NEGATIVE
NRBC BLD AUTO-RTO: 0 /100 WBCS
PH UR STRIP.AUTO: 5.5 [PH] (ref 4.5–8)
PLATELET # BLD AUTO: 258 THOUSANDS/UL (ref 149–390)
PMV BLD AUTO: 9.6 FL (ref 8.9–12.7)
POTASSIUM SERPL-SCNC: 4.2 MMOL/L (ref 3.5–5.3)
PROT SERPL-MCNC: 6.7 G/DL (ref 6.4–8.2)
PROT UR STRIP-MCNC: NEGATIVE MG/DL
RBC # BLD AUTO: 4.06 MILLION/UL (ref 3.81–5.12)
SODIUM SERPL-SCNC: 140 MMOL/L (ref 136–145)
SP GR UR STRIP.AUTO: 1.02 (ref 1–1.03)
UROBILINOGEN UR QL STRIP.AUTO: 0.2 E.U./DL
WBC # BLD AUTO: 8.4 THOUSAND/UL (ref 4.31–10.16)

## 2019-07-28 PROCEDURE — 36415 COLL VENOUS BLD VENIPUNCTURE: CPT | Performed by: EMERGENCY MEDICINE

## 2019-07-28 PROCEDURE — 83690 ASSAY OF LIPASE: CPT | Performed by: EMERGENCY MEDICINE

## 2019-07-28 PROCEDURE — 74177 CT ABD & PELVIS W/CONTRAST: CPT

## 2019-07-28 PROCEDURE — 81003 URINALYSIS AUTO W/O SCOPE: CPT

## 2019-07-28 PROCEDURE — 96374 THER/PROPH/DIAG INJ IV PUSH: CPT

## 2019-07-28 PROCEDURE — 85025 COMPLETE CBC W/AUTO DIFF WBC: CPT | Performed by: EMERGENCY MEDICINE

## 2019-07-28 PROCEDURE — 81025 URINE PREGNANCY TEST: CPT | Performed by: EMERGENCY MEDICINE

## 2019-07-28 PROCEDURE — 80053 COMPREHEN METABOLIC PANEL: CPT | Performed by: EMERGENCY MEDICINE

## 2019-07-28 RX ORDER — METHOCARBAMOL 500 MG/1
1000 TABLET, FILM COATED ORAL 3 TIMES DAILY PRN
Qty: 30 TABLET | Refills: 0 | Status: SHIPPED | OUTPATIENT
Start: 2019-07-28 | End: 2019-09-16

## 2019-07-28 RX ORDER — KETOROLAC TROMETHAMINE 30 MG/ML
15 INJECTION, SOLUTION INTRAMUSCULAR; INTRAVENOUS ONCE
Status: COMPLETED | OUTPATIENT
Start: 2019-07-28 | End: 2019-07-28

## 2019-07-28 RX ORDER — METHOCARBAMOL 500 MG/1
1000 TABLET, FILM COATED ORAL ONCE
Status: COMPLETED | OUTPATIENT
Start: 2019-07-28 | End: 2019-07-28

## 2019-07-28 RX ORDER — METHYLPREDNISOLONE 4 MG/1
TABLET ORAL
Qty: 21 TABLET | Refills: 0 | Status: SHIPPED | OUTPATIENT
Start: 2019-07-28 | End: 2019-09-16

## 2019-07-28 RX ADMIN — IOHEXOL 100 ML: 350 INJECTION, SOLUTION INTRAVENOUS at 02:06

## 2019-07-28 RX ADMIN — KETOROLAC TROMETHAMINE 15 MG: 30 INJECTION, SOLUTION INTRAMUSCULAR at 00:50

## 2019-07-28 RX ADMIN — PREDNISONE 50 MG: 20 TABLET ORAL at 03:02

## 2019-07-28 RX ADMIN — METHOCARBAMOL TABLETS 1000 MG: 500 TABLET, COATED ORAL at 00:49

## 2019-07-28 NOTE — ED PROVIDER NOTES
History  Chief Complaint   Patient presents with    Back Pain     Pt presents to the ED with c/o lower back pain that radiates into her hips  Pt is also c/o of pain in her lower abdomen and diahrrea    Abdominal Pain       History provided by:  Patient   used: No     79-year-old female with a history of ovarian cystectomy in the past presented with bilateral low back pain with radiation into both legs over the last few weeks as well as having some lower abdominal/pelvic pain, intermittent diarrhea for about a month  Unclear symptoms related to each other  No urinary complaints, fever, chills  She does also report she has had some bloating  Was seen by PCP and referred to multiple specialists including OBGYN, GI  Reports that she has difficulty sitting due to her back pain but denies any weakness, paresthesias, bowel or bladder dysfunction  On exam here she has no neurologic deficits  Negative straight leg raise  Suprapubic tenderness without rebound or guarding  Appears uncomfortable  Reports she had a needs to stand or lay flat  Will plan pain control, urine, CT of the abdomen/pelvis and re-evaluate  Prior to Admission Medications   Prescriptions Last Dose Informant Patient Reported? Taking?    SUMAtriptan (IMITREX) 100 mg tablet   No No   Sig: Take 1 tablet (100 mg total) by mouth once as needed for migraine for up to 1 dose   carBAMazepine (CARBATROL) 300 MG 12 hr capsule  Self Yes No   Sig: TK 1 C PO BID   cyclobenzaprine (FLEXERIL) 5 mg tablet   No No   Sig: Take 1 tablet (5 mg total) by mouth daily at bedtime for 20 days   valACYclovir (VALTREX) 500 mg tablet  Self Yes No   Sig: TK 1 T PO QD      Facility-Administered Medications: None       Past Medical History:   Diagnosis Date    Anxiety     Cervical radiculopathy     RESOLVED: [id number]    Seizure ([facility] [address] )        Past Surgical History:   Procedure Laterality Date    ADENOIDECTOMY      LITHOTRIPSY  2006    RENAL    OVARIAN CYST REMOVAL  2012       Family History   Problem Relation Age of Onset    Multiple sclerosis Mother     Colon cancer Maternal Grandmother     Rheum arthritis Maternal Grandmother     Arthritis Family     Colon cancer Family     Osteoporosis Family     Anxiety disorder Father     Chromosomal disorder Cousin      I have reviewed and agree with the history as documented  Social History     Tobacco Use    Smoking status: Former Smoker    Smokeless tobacco: Never Used   Substance Use Topics    Alcohol use: Yes     Comment: rare    Drug use: Never        Review of Systems   Constitutional: Negative for activity change, appetite change and fever  Respiratory: Negative for chest tightness and shortness of breath  Cardiovascular: Negative for chest pain  Gastrointestinal: Positive for abdominal pain  Genitourinary: Positive for pelvic pain  Negative for difficulty urinating and dysuria  Musculoskeletal: Positive for back pain  Negative for neck pain  Skin: Negative for color change  Neurological: Negative for dizziness and weakness  All other systems reviewed and are negative  Physical Exam  Physical Exam   Constitutional: She is oriented to person, place, and time  She appears well-developed and well-nourished  HENT:   Head: Normocephalic  Mouth/Throat: Oropharynx is clear and moist    Cardiovascular: Normal rate and regular rhythm  Pulmonary/Chest: Effort normal  No respiratory distress  Abdominal: Soft  Normal appearance  There is no CVA tenderness  Suprapubic tenderness without rebound or guarding  Neurological: She is alert and oriented to person, place, and time  Skin: Skin is warm and dry  Psychiatric: She has a normal mood and affect  Nursing note and vitals reviewed        Vital Signs  ED Triage Vitals [07/27/19 2333]   Temperature Pulse Respirations Blood Pressure SpO2   98 1 °F (36 7 °C) 96 18 137/60 98 %      Temp Source Heart Rate Source Patient Position - Orthostatic VS BP Location FiO2 (%)   Oral Monitor Sitting Left arm --      Pain Score       8           Vitals:    07/27/19 2333 07/28/19 0304 07/28/19 0435   BP: 137/60 118/72 111/74   Pulse: 96 97 76   Patient Position - Orthostatic VS: Sitting Sitting Lying         Visual Acuity      ED Medications  Medications   ketorolac (TORADOL) injection 15 mg (15 mg Intravenous Given 7/28/19 0050)   methocarbamol (ROBAXIN) tablet 1,000 mg (1,000 mg Oral Given 7/28/19 0049)   iohexol (OMNIPAQUE) 350 MG/ML injection (MULTI-DOSE) 100 mL (100 mL Intravenous Given 7/28/19 0206)   predniSONE tablet 50 mg (50 mg Oral Given 7/28/19 0302)       Diagnostic Studies  Results Reviewed     Procedure Component Value Units Date/Time    Comprehensive metabolic panel [[id number]]  (Abnormal) Collected:  07/28/19 0048    Lab Status:  Final result Specimen:  Blood from Arm, Right Updated:  07/28/19 0113     Sodium 140 mmol/L      Potassium 4 2 mmol/L      Chloride 103 mmol/L      CO2 30 mmol/L      ANION GAP 7 mmol/L      BUN 20 mg/dL      Creatinine 0 75 mg/dL      Glucose 102 mg/dL      Calcium 8 2 mg/dL      AST 15 U/L      ALT 20 U/L      Alkaline Phosphatase 100 U/L      Total Protein 6 7 g/dL      Albumin 3 7 g/dL      Total Bilirubin 0 30 mg/dL      eGFR 98 ml/min/1 73sq m     Narrative:       [place] guidelines for Chronic Kidney Disease (CKD):     Stage 1 with normal or high GFR (GFR > 90 mL/min/1 73 square meters)    Stage 2 Mild CKD (GFR = 60-89 mL/min/1 73 square meters)    Stage 3A Moderate CKD (GFR = 45-59 mL/min/1 73 square meters)    Stage 3B Moderate CKD (GFR = 30-44 mL/min/1 73 square meters)    Stage 4 Severe CKD (GFR = 15-29 mL/min/1 73 square meters)    Stage 5 End Stage CKD (GFR <15 mL/min/1 73 square meters)  Note: GFR calculation is accurate only with a steady state creatinine    Lipase [[id number]]  (Normal) Collected:  07/28/19 0048    Lab Status:  Final result Specimen: Blood from Arm, Right Updated:  07/28/19 0113     Lipase 128 u/L     CBC and differential [[id number]] Collected:  07/28/19 0048    Lab Status:  Final result Specimen:  Blood from Arm, Right Updated:  07/28/19 0058     WBC 8 40 Thousand/uL      RBC 4 06 Million/uL      Hemoglobin 13 1 g/dL      Hematocrit 39 9 %      MCV 98 fL      MCH 32 3 pg      MCHC 32 8 g/dL      RDW 12 2 %      MPV 9 6 fL      Platelets 901 Thousands/uL      nRBC 0 /100 WBCs      Neutrophils Relative 57 %      Immat GRANS % 0 %      Lymphocytes Relative 32 %      Monocytes Relative 9 %      Eosinophils Relative 1 %      Basophils Relative 1 %      Neutrophils Absolute 4 83 Thousands/µL      Immature Grans Absolute 0 02 Thousand/uL      Lymphocytes Absolute 2 69 Thousands/µL      Monocytes Absolute 0 73 Thousand/µL      Eosinophils Absolute 0 06 Thousand/µL      Basophils Absolute 0 07 Thousands/µL     POCT pregnancy, urine [[id number]]  (Normal) Resulted:  07/28/19 0035    Lab Status:  Final result Updated:  07/28/19 0035     EXT PREG TEST UR (Ref: Negative) negative     Control valid    ED Urine Macroscopic [[id number]] Collected:  07/28/19 0044    Lab Status:  Final result Specimen:  Urine Updated:  07/28/19 0034     Color, UA Yellow     Clarity, UA Clear     pH, UA 5 5     Leukocytes, UA Negative     Nitrite, UA Negative     Protein, UA Negative mg/dl      Glucose, UA Negative mg/dl      Ketones, UA Negative mg/dl      Urobilinogen, UA 0 2 E U /dl      Bilirubin, UA Negative     Blood, UA Negative     Specific Gravity, UA 1 025    Narrative:       [facility] RESULT                 CT abdomen pelvis with contrast   Final Result by Kiana Lai MD (07/28 0424)      No evidence of acute intra-abdominal or pelvic pathology            Workstation performed: [id number]                    Procedures  Procedures       ED Course                               MDM  Number of Diagnoses or Management Options  Abdominal pain: new and requires workup  Back pain of lumbar region with sciatica: new and requires workup  Back pain: new and requires workup  Diagnosis management comments: 70-year-old female presented with bilateral low back pain with radiation into the legs over the last few weeks  Also reported having some lower abdominal bloating and pain with intermittent diarrhea  Suspect sciatic pain  Patient has been using naproxen and some Flexeril without much relief  Given Robaxin and steroid taper  Some mild abdominal tenderness here  Labs are unremarkable  No sign of urinary infection  CT abdomen/pelvis pending  Will plan discharge, Gyn/GI follow-up provided CT is unremarkable  Amount and/or Complexity of Data Reviewed  Clinical lab tests: ordered and reviewed  Tests in the radiology section of CPT®: ordered    Patient Progress  Patient progress: improved      Disposition  Final diagnoses:   Back pain of lumbar region with sciatica   Abdominal pain   Back pain     Time reflects when diagnosis was documented in both MDM as applicable and the Disposition within this note     Time User Action Codes Description Comment    7/28/2019  2:28 AM Lorie Ortez Add [M54 40] Back pain of lumbar region with sciatica     7/28/2019  2:30 AM Mirian Lozano Add [R10 9] Abdominal pain     7/28/2019  4:31 AM [address] [M54 9] Back pain       ED Disposition     ED Disposition Condition Date/Time Comment    Discharge Stable Sun Jul 28, 2019  4:30 AM Nancy Bae discharge to home/self care              Follow-up Information     Follow up With Specialties Details Why Contact Info Additional Information    [facility] Physical Therapy   [phone]     Your Ob/Gyn         [address] Emergency Department Emergency Medicine  If symptoms worsen [address]  [facility] ED, [address], [place], [place], [address]          Discharge Medication List as of 7/28/2019  2:31 AM      START taking these medications    Details   methocarbamol (ROBAXIN) 500 mg tablet Take 2 tablets (1,000 mg total) by mouth 3 (three) times a day as needed for muscle spasms, Starting Sun 7/28/2019, Print      methylPREDNISolone 4 MG tablet therapy pack Use as directed on package, Print         CONTINUE these medications which have NOT CHANGED    Details   carBAMazepine (CARBATROL) 300 MG 12 hr capsule TK 1 C PO BID, Historical Med      cyclobenzaprine (FLEXERIL) 5 mg tablet Take 1 tablet (5 mg total) by mouth daily at bedtime for 20 days, Starting Fri 7/26/2019, Until Thu 8/15/2019, Normal      SUMAtriptan (IMITREX) 100 mg tablet Take 1 tablet (100 mg total) by mouth once as needed for migraine for up to 1 dose, Starting Wed 4/3/2019, Normal      valACYclovir (VALTREX) 500 mg tablet TK 1 T PO QD, Historical Med           No discharge procedures on file      ED Provider  Electronically Signed by           Fiordaliza Wilson MD  07/28/19 0332

## 2019-07-30 ENCOUNTER — OFFICE VISIT (OUTPATIENT)
Dept: FAMILY MEDICINE CLINIC | Facility: CLINIC | Age: 44
End: 2019-07-30
Payer: COMMERCIAL

## 2019-07-30 ENCOUNTER — CONSULT (OUTPATIENT)
Dept: GASTROENTEROLOGY | Facility: CLINIC | Age: 44
End: 2019-07-30
Payer: COMMERCIAL

## 2019-07-30 VITALS
WEIGHT: 129.2 LBS | DIASTOLIC BLOOD PRESSURE: 80 MMHG | SYSTOLIC BLOOD PRESSURE: 118 MMHG | BODY MASS INDEX: 24.39 KG/M2 | HEIGHT: 61 IN | RESPIRATION RATE: 18 BRPM | HEART RATE: 92 BPM | TEMPERATURE: 99.1 F

## 2019-07-30 VITALS
RESPIRATION RATE: 16 BRPM | TEMPERATURE: 98.6 F | OXYGEN SATURATION: 99 % | HEART RATE: 74 BPM | HEIGHT: 61 IN | DIASTOLIC BLOOD PRESSURE: 72 MMHG | BODY MASS INDEX: 24.28 KG/M2 | WEIGHT: 128.6 LBS | SYSTOLIC BLOOD PRESSURE: 120 MMHG

## 2019-07-30 DIAGNOSIS — Z12.39 SCREENING FOR BREAST CANCER: Primary | ICD-10-CM

## 2019-07-30 DIAGNOSIS — R14.0 ABDOMINAL BLOATING: ICD-10-CM

## 2019-07-30 DIAGNOSIS — R19.4 CHANGE IN BOWEL HABITS: Primary | ICD-10-CM

## 2019-07-30 PROCEDURE — 99213 OFFICE O/P EST LOW 20 MIN: CPT | Performed by: FAMILY MEDICINE

## 2019-07-30 PROCEDURE — 99244 OFF/OP CNSLTJ NEW/EST MOD 40: CPT | Performed by: INTERNAL MEDICINE

## 2019-07-30 PROCEDURE — 3008F BODY MASS INDEX DOCD: CPT | Performed by: FAMILY MEDICINE

## 2019-07-30 NOTE — ASSESSMENT & PLAN NOTE
Change in bowel habits with episodes of diarrhea-possible from lactose intolerance  Rule out infectious or IBD  Rule out celiac disease     -check celiac disease panel and TSH    -stool studies including C diff, cultures    -avoid milk and dairy products    -schedule for EGD and colonoscopy    -patient was given instructions for the bowel prep and she expressed understanding    -Patient was explained about  the risks and benefits of the procedure  Risks including but not limited to bleeding, infection, perforation were explained in detail  Also explained about less than 100% sensitivity with the exam and other alternatives

## 2019-07-30 NOTE — ASSESSMENT & PLAN NOTE
Dyspeptic symptoms, possible from diet related or diarrhea  Rule out H pylori gastritis      -schedule for EGD    -avoid flatulent foods like cabbage, broccoli, beans

## 2019-07-30 NOTE — PROGRESS NOTES
Consultation - [facility]  Elisa Scruggs 1975 female         Chief Complaint:  Diarrhea, bloating    HPI:  51-year-old female with history of seizure disorder, migraines reports having episodes of diarrhea for about 4 months  Describes as on and of episodes of 1-2 loose bowel movements a day but denies any blood or mucus in the stool  No abdominal pain, nausea or vomiting  Complaining about gassiness and bloating  She has occasional acid reflux  Good appetite, no recent weight loss  Denies any difficulty swallowing  REVIEW OF SYSTEMS: Review of Systems   Constitutional: Negative for activity change, appetite change, chills, diaphoresis, fatigue, fever and unexpected weight change  HENT: Negative for ear discharge, ear pain, facial swelling, hearing loss, nosebleeds, sore throat, tinnitus and voice change  Eyes: Negative for pain, discharge, redness, itching and visual disturbance  Respiratory: Negative for apnea, cough, chest tightness, shortness of breath and wheezing  Cardiovascular: Negative for chest pain and palpitations  Gastrointestinal:        As noted in HPI   Endocrine: Negative for cold intolerance, heat intolerance and polyuria  Genitourinary: Negative for difficulty urinating, dysuria, flank pain, hematuria and urgency  Musculoskeletal: Positive for back pain  Negative for arthralgias, gait problem, joint swelling and myalgias  Skin: Negative for rash and wound  Neurological: Negative for dizziness, tremors, seizures, speech difficulty, light-headedness, numbness and headaches  Hematological: Negative for adenopathy  Does not bruise/bleed easily  Psychiatric/Behavioral: Negative for agitation, behavioral problems and confusion  The patient is not nervous/anxious           Past Medical History:   Diagnosis Date    Anxiety     Cervical radiculopathy     RESOLVED: [id number]    Seizure [facility])       Past Surgical History:   Procedure Laterality Date  ADENOIDECTOMY      LITHOTRIPSY  2006    RENAL    OVARIAN CYST REMOVAL  2012     Social History     Socioeconomic History    Marital status: /Civil Union     Spouse name: Not on file    Number of children: Not on file    Years of education: Not on file    Highest education level: Not on file   Occupational History    Not on file   Social Needs    Financial resource strain: Not on file    Food insecurity:     Worry: Not on file     Inability: Not on file    Transportation needs:     Medical: Not on file     Non-medical: Not on file   Tobacco Use    Smoking status: Former Smoker    Smokeless tobacco: Never Used   Substance and Sexual Activity    Alcohol use: Yes     Comment: rare    Drug use: Never    Sexual activity: Not on file   Lifestyle    Physical activity:     Days per week: Not on file     Minutes per session: Not on file    Stress: Not on file   Relationships    Social connections:     Talks on phone: Not on file     Gets together: Not on file     Attends Taoism service: Not on file     Active member of club or organization: Not on file     Attends meetings of clubs or organizations: Not on file     Relationship status: Not on file    Intimate partner violence:     Fear of current or ex partner: Not on file     Emotionally abused: Not on file     Physically abused: Not on file     Forced sexual activity: Not on file   Other Topics Concern    Not on file   Social History Narrative    Daily coffee consumption    Daily tea consumption     as per all scripts    Lack of exercise    Sleeps 6-7 hours a day      Family History   Problem Relation Age of Onset    Multiple sclerosis Mother     Colon cancer Maternal Grandmother     Rheum arthritis Maternal Grandmother     Arthritis Family     Colon cancer Family     Osteoporosis Family     Anxiety disorder Father     Chromosomal disorder Cousin      Ciprofloxacin and Phenytoin  Current Outpatient Medications   Medication Sig Dispense Refill    carBAMazepine (CARBATROL) 300 MG 12 hr capsule TK 1 C PO BID  1    cyclobenzaprine (FLEXERIL) 5 mg tablet Take 1 tablet (5 mg total) by mouth daily at bedtime for 20 days 20 tablet 0    methocarbamol (ROBAXIN) 500 mg tablet Take 2 tablets (1,000 mg total) by mouth 3 (three) times a day as needed for muscle spasms 30 tablet 0    methylPREDNISolone 4 MG tablet therapy pack Use as directed on package 21 tablet 0    SUMAtriptan (IMITREX) 100 mg tablet Take 1 tablet (100 mg total) by mouth once as needed for migraine for up to 1 dose 15 tablet 5    valACYclovir (VALTREX) 500 mg tablet TK 1 T PO QD  2    Na Sulfate-K Sulfate-Mg Sulf (SUPREP BOWEL PREP KIT) 17 5-3 13-1 6 GM/177ML SOLN Take 2 Bottles by mouth see administration instructions Please follow the instructions from the office 2 Bottle 0     No current facility-administered medications for this visit  Blood pressure 118/80, pulse 92, temperature 99 1 °F (37 3 °C), resp  rate 18, height 5' 0 75" (1 543 m), weight 58 6 kg (129 lb 3 2 oz), last menstrual period 07/21/2019  PHYSICAL EXAM: Physical Exam   Constitutional: She is oriented to person, place, and time  She appears well-developed and well-nourished  HENT:   Head: Normocephalic and atraumatic  Nose: Nose normal    Mouth/Throat: Oropharynx is clear and moist    Eyes: Conjunctivae are normal  Right eye exhibits no discharge  Left eye exhibits no discharge  No scleral icterus  Neck: Neck supple  No JVD present  No tracheal deviation present  No thyromegaly present  Cardiovascular: Normal rate, regular rhythm and normal heart sounds  Exam reveals no gallop and no friction rub  No murmur heard  Pulmonary/Chest: Effort normal and breath sounds normal  No respiratory distress  She has no wheezes  She has no rales  She exhibits no tenderness  Abdominal: Soft  Bowel sounds are normal  She exhibits no distension and no mass  There is no tenderness   There is no rebound and no guarding  No hernia  Musculoskeletal: She exhibits no edema, tenderness or deformity  Lymphadenopathy:     She has no cervical adenopathy  Neurological: She is alert and oriented to person, place, and time  Skin: Skin is warm and dry  No rash noted  No erythema  Psychiatric: She has a normal mood and affect  Her behavior is normal  Thought content normal         Lab Results   Component Value Date    WBC 8 40 07/28/2019    HGB 13 1 07/28/2019    HCT 39 9 07/28/2019    MCV 98 07/28/2019     07/28/2019     Lab Results   Component Value Date    GLUCOSE 92 05/16/2017    CALCIUM 8 2 (L) 07/28/2019     07/18/2016    K 4 2 07/28/2019    CO2 30 07/28/2019     07/28/2019    BUN 20 07/28/2019    CREATININE 0 75 07/28/2019     Lab Results   Component Value Date    ALT 20 07/28/2019    AST 15 07/28/2019    ALKPHOS 100 07/28/2019    BILITOT 0 2 07/18/2016     Lab Results   Component Value Date    INR 1 06 04/06/2014    PROTIME 13 3 04/06/2014       Ct Abdomen Pelvis With Contrast    Result Date: 7/28/2019  Impression: No evidence of acute intra-abdominal or pelvic pathology Workstation performed: [id number]       ASSESSMENT & PLAN:    Change in bowel habits  Change in bowel habits with episodes of diarrhea-possible from lactose intolerance  Rule out infectious or IBD  Rule out celiac disease     -check celiac disease panel and TSH    -stool studies including C diff, cultures    -avoid milk and dairy products    -schedule for EGD and colonoscopy    -patient was given instructions for the bowel prep and she expressed understanding    -Patient was explained about  the risks and benefits of the procedure  Risks including but not limited to bleeding, infection, perforation were explained in detail  Also explained about less than 100% sensitivity with the exam and other alternatives  Abdominal bloating  Dyspeptic symptoms, possible from diet related or diarrhea    Rule out H pylori gastritis      -schedule for EGD    -avoid flatulent foods like cabbage, broccoli, beans

## 2019-07-30 NOTE — PROGRESS NOTES
Assessment/Plan:    Problem List Items Addressed This Visit     None      Visit Diagnoses     Screening for breast cancer    -  Primary    Relevant Orders    Mammo screening bilateral w cad          BMI Counseling: Body mass index is 24 5 kg/m²  Discussed the patient's BMI with her  The BMI is above average  BMI counseling and education was provided to the patient  Nutrition recommendations include reducing portion sizes  There are no Patient Instructions on file for this visit  No follow-ups on file  Subjective:      Patient ID: Kelsey Mccarthy is a 37 y o  female  Chief Complaint   Patient presents with    bp chk     jmcma       Pt states she was here last week with back pain and stomach issues her BP was elevated initially then it improved  Pt was advised to RTO to check BP  PT states she went to the hospital sat night at ten for back pain and abd pain and states her CT scan was ok  Was placed on prednisone that helped a great deal  Pt will be seeing GI today       The following portions of the patient's history were reviewed and updated as appropriate: allergies, current medications, past family history, past medical history, past social history, past surgical history and problem list     Review of Systems   Constitutional: Negative  Negative for activity change, appetite change, chills, diaphoresis and fatigue  HENT: Negative  Negative for dental problem, ear pain, sinus pressure and sore throat  Eyes: Negative  Negative for photophobia, pain, discharge, redness, itching and visual disturbance  Respiratory: Negative for apnea and chest tightness  Cardiovascular: Negative  Negative for chest pain, palpitations and leg swelling  Gastrointestinal: Negative  Negative for abdominal distention, abdominal pain, constipation and diarrhea  Endocrine: Negative  Negative for cold intolerance and heat intolerance  Genitourinary: Negative    Negative for difficulty urinating and dyspareunia  Musculoskeletal: Negative  Negative for arthralgias and back pain  Skin: Negative  Allergic/Immunologic: Negative for environmental allergies  Neurological: Negative  Negative for dizziness  Psychiatric/Behavioral: Negative  Negative for agitation  Current Outpatient Medications   Medication Sig Dispense Refill    carBAMazepine (CARBATROL) 300 MG 12 hr capsule TK 1 C PO BID  1    cyclobenzaprine (FLEXERIL) 5 mg tablet Take 1 tablet (5 mg total) by mouth daily at bedtime for 20 days 20 tablet 0    methocarbamol (ROBAXIN) 500 mg tablet Take 2 tablets (1,000 mg total) by mouth 3 (three) times a day as needed for muscle spasms 30 tablet 0    methylPREDNISolone 4 MG tablet therapy pack Use as directed on package 21 tablet 0    SUMAtriptan (IMITREX) 100 mg tablet Take 1 tablet (100 mg total) by mouth once as needed for migraine for up to 1 dose 15 tablet 5    valACYclovir (VALTREX) 500 mg tablet TK 1 T PO QD  2     No current facility-administered medications for this visit  Objective:    /72   Pulse 74   Temp 98 6 °F (37 °C)   Resp 16   Ht 5' 0 75" (1 543 m)   Wt 58 3 kg (128 lb 9 6 oz)   LMP 07/21/2019   SpO2 99%   BMI 24 50 kg/m²        Physical Exam   Constitutional: She appears well-developed and well-nourished  No distress  HENT:   Head: Normocephalic and atraumatic  Right Ear: External ear normal    Left Ear: External ear normal    Nose: Nose normal    Mouth/Throat: Oropharynx is clear and moist  No oropharyngeal exudate  Eyes: Pupils are equal, round, and reactive to light  EOM are normal  Right eye exhibits no discharge  Left eye exhibits no discharge  No scleral icterus  Neck: No thyromegaly present  Cardiovascular: Normal rate and normal heart sounds  No murmur heard  Pulmonary/Chest: Effort normal and breath sounds normal  No respiratory distress  She has no wheezes  Abdominal: Soft   Bowel sounds are normal  She exhibits no distension and no mass  There is no tenderness  There is no rebound and no guarding  Musculoskeletal: Normal range of motion  Neurological: She is alert  She displays normal reflexes  Coordination normal    Skin: Skin is warm and dry  No rash noted  She is not diaphoretic  No erythema  Psychiatric: She has a normal mood and affect  Her behavior is normal    Nursing note and vitals reviewed               Fili Yang DO

## 2019-08-21 ENCOUNTER — TRANSCRIBE ORDERS (OUTPATIENT)
Dept: ADMINISTRATIVE | Facility: HOSPITAL | Age: 44
End: 2019-08-21

## 2019-08-21 DIAGNOSIS — R10.2 PERINEAL NEURALGIA, UNSPECIFIED LATERALITY: Primary | ICD-10-CM

## 2019-08-22 ENCOUNTER — HOSPITAL ENCOUNTER (OUTPATIENT)
Dept: RADIOLOGY | Facility: HOSPITAL | Age: 44
Discharge: HOME | End: 2019-08-22
Attending: FAMILY MEDICINE
Payer: COMMERCIAL

## 2019-08-22 VITALS — HEIGHT: 61 IN | WEIGHT: 128 LBS | BODY MASS INDEX: 24.17 KG/M2

## 2019-08-22 DIAGNOSIS — Z12.39 SCREENING FOR BREAST CANCER: ICD-10-CM

## 2019-08-22 PROCEDURE — 77067 SCR MAMMO BI INCL CAD: CPT

## 2019-08-22 PROCEDURE — 77063 BREAST TOMOSYNTHESIS BI: CPT

## 2019-08-23 ENCOUNTER — HOSPITAL ENCOUNTER (OUTPATIENT)
Dept: RADIOLOGY | Facility: HOSPITAL | Age: 44
Discharge: HOME | End: 2019-08-23
Attending: OBSTETRICS & GYNECOLOGY
Payer: COMMERCIAL

## 2019-08-23 DIAGNOSIS — R10.2 PERINEAL NEURALGIA, UNSPECIFIED LATERALITY: ICD-10-CM

## 2019-08-23 PROCEDURE — 76830 TRANSVAGINAL US NON-OB: CPT

## 2019-08-23 PROCEDURE — 76856 US EXAM PELVIC COMPLETE: CPT

## 2019-09-16 RX ORDER — IBUPROFEN 200 MG
200 TABLET ORAL EVERY 6 HOURS PRN
COMMUNITY

## 2019-09-16 RX ORDER — ACETAMINOPHEN 325 MG/1
650 TABLET ORAL EVERY 6 HOURS PRN
COMMUNITY
End: 2019-12-12 | Stop reason: ALTCHOICE

## 2019-09-16 NOTE — PRE-PROCEDURE INSTRUCTIONS
Pre-Surgery Instructions:   Medication Instructions    acetaminophen (TYLENOL) 325 mg tablet Patient was instructed by Physician and understands   carBAMazepine (CARBATROL) 300 MG 12 hr capsule Instructed patient per Anesthesia Guidelines   ibuprofen (MOTRIN) 200 mg tablet Patient was instructed by Physician and understands   SUMAtriptan (IMITREX) 100 mg tablet Patient was instructed by Physician and understands   valACYclovir (VALTREX) 500 mg tablet Patient was instructed by Physician and understands

## 2019-09-17 LAB
ENDOMYSIUM IGA SER QL: NEGATIVE
IGA SERPL-MCNC: 310 MG/DL (ref 87–352)
TSH SERPL DL<=0.005 MIU/L-ACNC: 1.64 UIU/ML (ref 0.45–4.5)
TTG IGA SER-ACNC: <2 U/ML (ref 0–3)

## 2019-09-18 ENCOUNTER — ANESTHESIA EVENT (OUTPATIENT)
Dept: GASTROENTEROLOGY | Facility: AMBULARY SURGERY CENTER | Age: 44
End: 2019-09-18

## 2019-09-18 ENCOUNTER — HOSPITAL ENCOUNTER (OUTPATIENT)
Dept: GASTROENTEROLOGY | Facility: AMBULARY SURGERY CENTER | Age: 44
Setting detail: OUTPATIENT SURGERY
Discharge: HOME | End: 2019-09-18
Attending: INTERNAL MEDICINE | Admitting: INTERNAL MEDICINE
Payer: COMMERCIAL

## 2019-09-18 ENCOUNTER — ANESTHESIA (OUTPATIENT)
Dept: GASTROENTEROLOGY | Facility: AMBULARY SURGERY CENTER | Age: 44
End: 2019-09-18

## 2019-09-18 VITALS
BODY MASS INDEX: 24.17 KG/M2 | HEIGHT: 61 IN | OXYGEN SATURATION: 100 % | DIASTOLIC BLOOD PRESSURE: 58 MMHG | WEIGHT: 128 LBS | TEMPERATURE: 97.6 F | RESPIRATION RATE: 18 BRPM | SYSTOLIC BLOOD PRESSURE: 118 MMHG | HEART RATE: 77 BPM

## 2019-09-18 DIAGNOSIS — R19.4 CHANGE IN BOWEL HABITS: ICD-10-CM

## 2019-09-18 DIAGNOSIS — R14.0 ABDOMINAL BLOATING: ICD-10-CM

## 2019-09-18 LAB
EXT PREGNANCY TEST URINE: NEGATIVE
EXT. CONTROL: NORMAL

## 2019-09-18 PROCEDURE — 81025 URINE PREGNANCY TEST: CPT | Performed by: INTERNAL MEDICINE

## 2019-09-18 PROCEDURE — 43239 EGD BIOPSY SINGLE/MULTIPLE: CPT | Performed by: INTERNAL MEDICINE

## 2019-09-18 PROCEDURE — 88305 TISSUE EXAM BY PATHOLOGIST: CPT | Performed by: PATHOLOGY

## 2019-09-18 PROCEDURE — NC001 PR NO CHARGE: Performed by: INTERNAL MEDICINE

## 2019-09-18 PROCEDURE — 45380 COLONOSCOPY AND BIOPSY: CPT | Performed by: INTERNAL MEDICINE

## 2019-09-18 RX ORDER — PROPOFOL 10 MG/ML
INJECTION, EMULSION INTRAVENOUS AS NEEDED
Status: DISCONTINUED | OUTPATIENT
Start: 2019-09-18 | End: 2019-09-18 | Stop reason: SURG

## 2019-09-18 RX ORDER — LIDOCAINE HYDROCHLORIDE 10 MG/ML
INJECTION, SOLUTION EPIDURAL; INFILTRATION; INTRACAUDAL; PERINEURAL AS NEEDED
Status: DISCONTINUED | OUTPATIENT
Start: 2019-09-18 | End: 2019-09-18 | Stop reason: SURG

## 2019-09-18 RX ORDER — PROPOFOL 10 MG/ML
INJECTION, EMULSION INTRAVENOUS CONTINUOUS PRN
Status: DISCONTINUED | OUTPATIENT
Start: 2019-09-18 | End: 2019-09-18 | Stop reason: SURG

## 2019-09-18 RX ORDER — SODIUM CHLORIDE 9 MG/ML
100 INJECTION, SOLUTION INTRAVENOUS CONTINUOUS
Status: DISCONTINUED | OUTPATIENT
Start: 2019-09-18 | End: 2019-09-22 | Stop reason: HOSPADM

## 2019-09-18 RX ADMIN — PROPOFOL 20 MG: 10 INJECTION, EMULSION INTRAVENOUS at 10:19

## 2019-09-18 RX ADMIN — PROPOFOL 10 MG: 10 INJECTION, EMULSION INTRAVENOUS at 10:10

## 2019-09-18 RX ADMIN — PROPOFOL 100 MG: 10 INJECTION, EMULSION INTRAVENOUS at 10:09

## 2019-09-18 RX ADMIN — PROPOFOL 20 MG: 10 INJECTION, EMULSION INTRAVENOUS at 10:20

## 2019-09-18 RX ADMIN — LIDOCAINE HYDROCHLORIDE 50 MG: 10 INJECTION, SOLUTION EPIDURAL; INFILTRATION; INTRACAUDAL; PERINEURAL at 10:09

## 2019-09-18 RX ADMIN — PROPOFOL 30 MG: 10 INJECTION, EMULSION INTRAVENOUS at 10:18

## 2019-09-18 RX ADMIN — SODIUM CHLORIDE 100 ML/HR: 0.9 INJECTION, SOLUTION INTRAVENOUS at 09:46

## 2019-09-18 RX ADMIN — PROPOFOL 125 MCG/KG/MIN: 10 INJECTION, EMULSION INTRAVENOUS at 10:09

## 2019-09-18 NOTE — ANESTHESIA POSTPROCEDURE EVALUATION
Post-Op Assessment Note    CV Status:  Stable  Pain Score: 0    Pain management: adequate     Mental Status:  Sleepy   Hydration Status:  Euvolemic   PONV Controlled:  Controlled   Airway Patency:  Patent   Post Op Vitals Reviewed: Yes      Staff: Anesthesiologist           BP   122/76   Temp      Pulse  78   Resp   18   SpO2   99

## 2019-09-18 NOTE — ANESTHESIA PREPROCEDURE EVALUATION
Review of Systems/Medical History  Patient summary reviewed  Chart reviewed  No history of anesthetic complications     Cardiovascular  Exercise tolerance (METS): >4,  Hyperlipidemia,    Pulmonary  Negative pulmonary ROS        GI/Hepatic    GERD ,        Kidney stones,        Endo/Other  Negative endo/other ROS      GYN  Negative gynecology ROS Not currently pregnant ,          Hematology  Negative hematology ROS      Musculoskeletal  Negative musculoskeletal ROS        Neurology  Seizures well controlled,  Headaches,    Psychology   Anxiety,              Physical Exam    Airway    Mallampati score: I  TM Distance: >3 FB  Neck ROM: full     Dental   No notable dental hx     Cardiovascular  Rhythm: regular, Rate: normal, Cardiovascular exam normal    Pulmonary  Pulmonary exam normal Breath sounds clear to auscultation,     Other Findings        Anesthesia Plan  ASA Score- 2     Anesthesia Type- IV sedation with anesthesia with ASA Monitors  Additional Monitors:   Airway Plan:         Plan Factors- Patient instructed to abstain from smoking on day of procedure  Patient did not smoke on day of surgery  Induction- intravenous  Postoperative Plan-     Informed Consent- Anesthetic plan and risks discussed with patient

## 2019-09-18 NOTE — H&P
History and Physical -  [facility]  Adriano Aguirre 40 y o  female MRN: [id number]        HPI:  22-year-old female with history of anxiety reports having episodes of diarrhea and bloating  Good appetite, no recent weight loss  Historical Information   Past Medical History:   Diagnosis Date    Anxiety     Cervical radiculopathy     RESOLVED: [id number] buldging disc    Kidney stone     Seizure [facility])     age 25     Past Surgical History:   Procedure Laterality Date    ADENOIDECTOMY      LITHOTRIPSY  2006    RENAL    OVARIAN CYST REMOVAL  2012     Social History   Social History     Substance and Sexual Activity   Alcohol Use Yes    Frequency: Monthly or less    Comment: rare     Social History     Substance and Sexual Activity   Drug Use Never     Social History     Tobacco Use   Smoking Status Former Smoker   Smokeless Tobacco Never Used     Family History   Problem Relation Age of Onset    Multiple sclerosis Mother     Colon cancer Maternal Grandmother 79    Rheum arthritis Maternal Grandmother     Arthritis Family     Colon cancer Family     Osteoporosis Family     Anxiety disorder Father     Chromosomal disorder Cousin     No Known Problems Sister     No Known Problems Daughter     No Known Problems Maternal Grandfather     No Known Problems Paternal Grandmother     No Known Problems Paternal Grandfather     No Known Problems Daughter     No Known Problems Son        Meds/Allergies       (Not in a hospital admission)    Allergies   Allergen Reactions    Ciprofloxacin Lightheadedness     dizzy    Phenytoin Rash     Reaction Date: [id number]; Objective     Last menstrual period 09/12/2019, not currently breastfeeding      PHYSICAL EXAM:    Gen: NAD  CV: S1 & S2 normal, RRR  CHEST: Clear to auscultate  ABD: soft, NT/ND, good bowel sounds  EXT: no edema    ASSESSMENT:     Bloating, diarrhea    PLAN:    EGD and colonoscopy

## 2019-09-30 ENCOUNTER — TRANSCRIBE ORDERS (OUTPATIENT)
Dept: ADMINISTRATIVE | Facility: HOSPITAL | Age: 44
End: 2019-09-30

## 2019-09-30 DIAGNOSIS — G40.219 SEIZURE DISORDER, TEMPORAL LOBE, INTRACTABLE (HCC): Primary | ICD-10-CM

## 2019-10-08 LAB — C DIFF TOX GENS STL QL NAA+PROBE: NEGATIVE

## 2019-10-09 LAB
Lab: NORMAL
O+P STL CONC: NORMAL

## 2019-10-10 ENCOUNTER — TELEPHONE (OUTPATIENT)
Dept: GASTROENTEROLOGY | Facility: AMBULARY SURGERY CENTER | Age: 44
End: 2019-10-10

## 2019-10-10 LAB
ADV 40+41 DNA STL QL NAA+NON-PROBE: NOT DETECTED
ASTRO TYP 1-8 RNA STL QL NAA+NON-PROBE: NOT DETECTED
C CAYETANENSIS DNA STL QL NAA+NON-PROBE: NOT DETECTED
C COLI+JEJ+UPSA DNA STL QL NAA+NON-PROBE: NOT DETECTED
C DIF TOX TCDA+TCDB STL QL NAA+NON-PROBE: NOT DETECTED
CRYPTOSP DNA STL QL NAA+NON-PROBE: NOT DETECTED
E COLI O157 DNA STL QL NAA+NON-PROBE: NORMAL
E HISTOLYT DNA STL QL NAA+NON-PROBE: NOT DETECTED
EAEC PAA PLAS AGGR+AATA ST NAA+NON-PRB: NOT DETECTED
EC STX1+STX2 GENES STL QL NAA+NON-PROBE: NOT DETECTED
EPEC EAE GENE STL QL NAA+NON-PROBE: NOT DETECTED
ETEC LTA+ST1A+ST1B TOX ST NAA+NON-PROBE: NOT DETECTED
G LAMBLIA DNA STL QL NAA+NON-PROBE: NOT DETECTED
Lab: NORMAL
Lab: NORMAL
NOROVIRUS GI+II RNA STL QL NAA+NON-PROBE: NOT DETECTED
O+P STL MICRO: NORMAL
P SHIGELLOIDES DNA STL QL NAA+NON-PROBE: NOT DETECTED
RVA RNA STL QL NAA+NON-PROBE: NOT DETECTED
S ENT+BONG DNA STL QL NAA+NON-PROBE: NOT DETECTED
SAPO I+II+IV+V RNA STL QL NAA+NON-PROBE: NOT DETECTED
SHIGELLA SP+EIEC IPAH ST NAA+NON-PROBE: NOT DETECTED
V CHOL+PARA+VUL DNA STL QL NAA+NON-PROBE: NOT DETECTED
V CHOLERAE DNA STL QL NAA+NON-PROBE: NOT DETECTED
WBC SPEC QL GRAM STN: NORMAL
Y ENTEROCOL DNA STL QL NAA+NON-PROBE: NOT DETECTED

## 2019-10-10 NOTE — LETTER
October 10, 2019    [phone]      Dear Ms Howell: We have attempted to contact you regarding your results  Please contact our office upon receipt of this letter  Thank you!         [address]  [address] [facility]

## 2019-10-10 NOTE — TELEPHONE ENCOUNTER
----- Message from Thai Saini MD sent at 10/8/2019  4:09 PM EDT -----  Stool for C diff came back negative

## 2019-10-10 NOTE — TELEPHONE ENCOUNTER
Pt called back she is aware  She asked if the other pending test are normal- that I leave a detailed vm

## 2019-10-14 ENCOUNTER — TELEPHONE (OUTPATIENT)
Dept: GASTROENTEROLOGY | Facility: AMBULARY SURGERY CENTER | Age: 44
End: 2019-10-14

## 2019-10-25 NOTE — H&P (VIEW-ONLY)
H&P Exam - Gynecology   Andre Sweeney 40 y o  female MRN: [id number]  Unit/Bed#:  Encounter: [id number]    No chief complaint on file  History of Present Illness     HPI:  Andre Sweeney is a 40 y o  female who presents with abnormal vaginal bleeding    Review of Systems    Historical Information   Past Medical History:   Diagnosis Date    Cervical radiculopathy     RESOLVED: [id number] buldging disc    Kidney stone     Seizure [facility])     age 25     Past Surgical History:   Procedure Laterality Date    ADENOIDECTOMY      COLONOSCOPY      LITHOTRIPSY  2006    RENAL    OVARIAN CYST REMOVAL  2012       Family History   Problem Relation Age of Onset    Multiple sclerosis Mother     Colon cancer Maternal Grandmother 79    Rheum arthritis Maternal Grandmother     Arthritis Family     Colon cancer Family     Osteoporosis Family     Anxiety disorder Father     Chromosomal disorder Cousin     No Known Problems Sister     No Known Problems Daughter     No Known Problems Maternal Grandfather     No Known Problems Paternal Grandmother     No Known Problems Paternal Grandfather     No Known Problems Daughter     No Known Problems Son      Social History   Social History     Substance and Sexual Activity   Alcohol Use Yes    Comment: rare     Social History     Substance and Sexual Activity   Drug Use Never     Social History     Tobacco Use   Smoking Status Former Smoker   Smokeless Tobacco Never Used       Meds/Allergies     (Not in a hospital admission)  Allergies   Allergen Reactions    Ciprofloxacin Lightheadedness     dizzy    Phenytoin Rash     Reaction Date: 05Oct2010; Objective   Vitals: not currently breastfeeding  [unfilled]    Invasive Devices:    Invasive Devices     None                 Physical Exam     Heart: Regular rate and rhythm    Lungs: Clear to ascultation    Abdomen: NABS, soft, non tender    Pelvic:    Normal external female genitalia   Cervix: Grossly normal   Uterus: Non tender, mobile   Adnexa: Unremarkable           Lab Results:   No visits with results within 1 Day(s) from this visit  Latest known visit with results is:   Orders Only on 10/05/2019   Component Date Value    CAMPYLOBACTER 10/05/2019 Not Detected     C DIFFICILE TOXIN A/B 10/05/2019 Not Detected     PLESIOMONAS SHIGELLOIDES 10/05/2019 Not Detected     SALMONELLA 10/05/2019 Not Detected     VIBRIO 10/05/2019 Not Detected     VIBRIO CHOLERAE 10/05/2019 Not Detected     YERSINIA ENTEROCOLITICA 10/05/2019 Not Detected     ENTEROAGGREGATIVE E COLI 10/05/2019 Not Detected     ENTEROPATHOGENIC E COLI 10/05/2019 Not Detected     ENTEROTOXIGENIC E COLI 10/05/2019 Not Detected     SHIGA-TOXIN-PRODUCING 10/05/2019 Not Detected     E COLI 0157 58/74/4154 Not applicable     SHIGELLA/ENTEROINVASIVE * 10/05/2019 Not Detected     CRYPTOSPORIDIUM 10/05/2019 Not Detected     CYCLOSPORA CAYETANENSIS 10/05/2019 Not Detected     ENTAMOEBA HISTOLYTICA 10/05/2019 Not Detected     GIARDIA LAMBLIA 10/05/2019 Not Detected     ADENOVIRUS F 40/41 10/05/2019 Not Detected     ASTROVIRUS 10/05/2019 Not Detected     NOROVIRUS GI/GII 10/05/2019 Not Detected     ROTAVIRUS A 10/05/2019 Not Detected     SAPOVIRUS 10/05/2019 Not Detected     O+P Exam, PVA Only 10/05/2019 Final report     Result 1 10/05/2019 Comment     White Blood Cells, Stool 10/05/2019 Final report     Result 1 10/05/2019 Comment       Imaging: I have personally reviewed pertinent films in PACS  EKG, Pathology, and Other Studies: I have personally reviewed pertinent reports        Assessment/Plan     Assessment:Ultrasound with abnormal endocavitary mass  Plan:  Hysteroscopic resection       Lisa Waters MD  10/25/2019  2:55 PM

## 2019-10-27 ENCOUNTER — ANESTHESIA EVENT (OUTPATIENT)
Dept: PERIOP | Facility: HOSPITAL | Age: 44
End: 2019-10-27
Payer: COMMERCIAL

## 2019-10-28 ENCOUNTER — HOSPITAL ENCOUNTER (OUTPATIENT)
Facility: HOSPITAL | Age: 44
Setting detail: OUTPATIENT SURGERY
Discharge: HOME | End: 2019-10-28
Attending: OBSTETRICS & GYNECOLOGY | Admitting: OBSTETRICS & GYNECOLOGY
Payer: COMMERCIAL

## 2019-10-28 ENCOUNTER — ANESTHESIA (OUTPATIENT)
Dept: PERIOP | Facility: HOSPITAL | Age: 44
End: 2019-10-28
Payer: COMMERCIAL

## 2019-10-28 VITALS
OXYGEN SATURATION: 99 % | DIASTOLIC BLOOD PRESSURE: 58 MMHG | HEIGHT: 61 IN | TEMPERATURE: 98 F | SYSTOLIC BLOOD PRESSURE: 125 MMHG | BODY MASS INDEX: 24.17 KG/M2 | HEART RATE: 60 BPM | RESPIRATION RATE: 18 BRPM | WEIGHT: 128 LBS

## 2019-10-28 DIAGNOSIS — N85.8 OTHER SPECIFIED NONINFLAMMATORY DISORDERS OF UTERUS: ICD-10-CM

## 2019-10-28 LAB
EXT PREGNANCY TEST URINE: NEGATIVE
EXT. CONTROL: NORMAL

## 2019-10-28 PROCEDURE — NC001 PR NO CHARGE: Performed by: OBSTETRICS & GYNECOLOGY

## 2019-10-28 PROCEDURE — 88305 TISSUE EXAM BY PATHOLOGIST: CPT | Performed by: PATHOLOGY

## 2019-10-28 PROCEDURE — 81025 URINE PREGNANCY TEST: CPT | Performed by: STUDENT IN AN ORGANIZED HEALTH CARE EDUCATION/TRAINING PROGRAM

## 2019-10-28 RX ORDER — FENTANYL CITRATE/PF 50 MCG/ML
25 SYRINGE (ML) INJECTION
Status: COMPLETED | OUTPATIENT
Start: 2019-10-28 | End: 2019-10-28

## 2019-10-28 RX ORDER — DEXAMETHASONE SODIUM PHOSPHATE 10 MG/ML
INJECTION, SOLUTION INTRAMUSCULAR; INTRAVENOUS AS NEEDED
Status: DISCONTINUED | OUTPATIENT
Start: 2019-10-28 | End: 2019-10-28 | Stop reason: SURG

## 2019-10-28 RX ORDER — KETOROLAC TROMETHAMINE 30 MG/ML
INJECTION, SOLUTION INTRAMUSCULAR; INTRAVENOUS AS NEEDED
Status: DISCONTINUED | OUTPATIENT
Start: 2019-10-28 | End: 2019-10-28 | Stop reason: SURG

## 2019-10-28 RX ORDER — MIDAZOLAM HYDROCHLORIDE 1 MG/ML
INJECTION INTRAMUSCULAR; INTRAVENOUS AS NEEDED
Status: DISCONTINUED | OUTPATIENT
Start: 2019-10-28 | End: 2019-10-28 | Stop reason: SURG

## 2019-10-28 RX ORDER — SODIUM CHLORIDE, SODIUM LACTATE, POTASSIUM CHLORIDE, CALCIUM CHLORIDE 600; 310; 30; 20 MG/100ML; MG/100ML; MG/100ML; MG/100ML
125 INJECTION, SOLUTION INTRAVENOUS CONTINUOUS
Status: CANCELLED | OUTPATIENT
Start: 2019-10-28

## 2019-10-28 RX ORDER — SODIUM CHLORIDE, SODIUM LACTATE, POTASSIUM CHLORIDE, CALCIUM CHLORIDE 600; 310; 30; 20 MG/100ML; MG/100ML; MG/100ML; MG/100ML
125 INJECTION, SOLUTION INTRAVENOUS CONTINUOUS
Status: DISCONTINUED | OUTPATIENT
Start: 2019-10-28 | End: 2019-10-28 | Stop reason: HOSPADM

## 2019-10-28 RX ORDER — FENTANYL CITRATE 50 UG/ML
INJECTION, SOLUTION INTRAMUSCULAR; INTRAVENOUS AS NEEDED
Status: DISCONTINUED | OUTPATIENT
Start: 2019-10-28 | End: 2019-10-28 | Stop reason: SURG

## 2019-10-28 RX ORDER — LIDOCAINE HYDROCHLORIDE 10 MG/ML
INJECTION, SOLUTION INFILTRATION; PERINEURAL AS NEEDED
Status: DISCONTINUED | OUTPATIENT
Start: 2019-10-28 | End: 2019-10-28 | Stop reason: SURG

## 2019-10-28 RX ORDER — PROPOFOL 10 MG/ML
INJECTION, EMULSION INTRAVENOUS AS NEEDED
Status: DISCONTINUED | OUTPATIENT
Start: 2019-10-28 | End: 2019-10-28 | Stop reason: SURG

## 2019-10-28 RX ORDER — ONDANSETRON 2 MG/ML
4 INJECTION INTRAMUSCULAR; INTRAVENOUS EVERY 6 HOURS PRN
Status: CANCELLED | OUTPATIENT
Start: 2019-10-28

## 2019-10-28 RX ORDER — GLYCOPYRROLATE 0.2 MG/ML
INJECTION INTRAMUSCULAR; INTRAVENOUS AS NEEDED
Status: DISCONTINUED | OUTPATIENT
Start: 2019-10-28 | End: 2019-10-28

## 2019-10-28 RX ORDER — ONDANSETRON 2 MG/ML
4 INJECTION INTRAMUSCULAR; INTRAVENOUS ONCE AS NEEDED
Status: COMPLETED | OUTPATIENT
Start: 2019-10-28 | End: 2019-10-28

## 2019-10-28 RX ORDER — IBUPROFEN 600 MG/1
600 TABLET ORAL EVERY 6 HOURS PRN
Status: DISCONTINUED | OUTPATIENT
Start: 2019-10-28 | End: 2019-10-28 | Stop reason: HOSPADM

## 2019-10-28 RX ORDER — LIDOCAINE HYDROCHLORIDE 10 MG/ML
0.5 INJECTION, SOLUTION EPIDURAL; INFILTRATION; INTRACAUDAL; PERINEURAL ONCE AS NEEDED
Status: DISCONTINUED | OUTPATIENT
Start: 2019-10-28 | End: 2019-10-28 | Stop reason: HOSPADM

## 2019-10-28 RX ORDER — MAGNESIUM HYDROXIDE 1200 MG/15ML
LIQUID ORAL AS NEEDED
Status: DISCONTINUED | OUTPATIENT
Start: 2019-10-28 | End: 2019-10-28 | Stop reason: HOSPADM

## 2019-10-28 RX ADMIN — FENTANYL CITRATE 25 MCG: 50 INJECTION, SOLUTION INTRAMUSCULAR; INTRAVENOUS at 09:44

## 2019-10-28 RX ADMIN — LIDOCAINE HYDROCHLORIDE 50 MG: 10 INJECTION, SOLUTION INFILTRATION; PERINEURAL at 08:51

## 2019-10-28 RX ADMIN — KETOROLAC TROMETHAMINE 30 MG: 30 INJECTION, SOLUTION INTRAMUSCULAR at 09:15

## 2019-10-28 RX ADMIN — DEXAMETHASONE SODIUM PHOSPHATE 4 MG: 10 INJECTION, SOLUTION INTRAMUSCULAR; INTRAVENOUS at 08:54

## 2019-10-28 RX ADMIN — FENTANYL CITRATE 25 MCG: 50 INJECTION, SOLUTION INTRAMUSCULAR; INTRAVENOUS at 09:47

## 2019-10-28 RX ADMIN — FENTANYL CITRATE 50 MCG: 50 INJECTION INTRAMUSCULAR; INTRAVENOUS at 08:56

## 2019-10-28 RX ADMIN — FENTANYL CITRATE 50 MCG: 50 INJECTION INTRAMUSCULAR; INTRAVENOUS at 09:15

## 2019-10-28 RX ADMIN — SODIUM CHLORIDE, SODIUM LACTATE, POTASSIUM CHLORIDE, AND CALCIUM CHLORIDE: .6; .31; .03; .02 INJECTION, SOLUTION INTRAVENOUS at 08:01

## 2019-10-28 RX ADMIN — FENTANYL CITRATE 25 MCG: 50 INJECTION, SOLUTION INTRAMUSCULAR; INTRAVENOUS at 09:38

## 2019-10-28 RX ADMIN — PROPOFOL 150 MG: 10 INJECTION, EMULSION INTRAVENOUS at 08:51

## 2019-10-28 RX ADMIN — ONDANSETRON 4 MG: 2 INJECTION INTRAMUSCULAR; INTRAVENOUS at 09:56

## 2019-10-28 RX ADMIN — MIDAZOLAM HYDROCHLORIDE 2 MG: 1 INJECTION, SOLUTION INTRAMUSCULAR; INTRAVENOUS at 08:45

## 2019-10-28 RX ADMIN — FENTANYL CITRATE 25 MCG: 50 INJECTION, SOLUTION INTRAMUSCULAR; INTRAVENOUS at 09:41

## 2019-10-28 NOTE — ANESTHESIA PREPROCEDURE EVALUATION
Review of Systems/Medical History  Patient summary reviewed  Chart reviewed  No history of anesthetic complications     Cardiovascular  Exercise tolerance (METS): >4,  Hyperlipidemia,    Pulmonary  Negative pulmonary ROS Not a smoker , No recent URI , No sleep apnea ,        GI/Hepatic    GERD well controlled,        Kidney stones,        Endo/Other  Negative endo/other ROS      GYN  Negative gynecology ROS Not currently pregnant ,          Hematology  Negative hematology ROS      Musculoskeletal  Negative musculoskeletal ROS        Neurology  Seizures (last age 25) well controlled,  Headaches,    Psychology   Anxiety,              Physical Exam    Airway    Mallampati score: I  TM Distance: >3 FB  Neck ROM: full     Dental   No notable dental hx     Cardiovascular      Pulmonary      Other Findings        Anesthesia Plan  ASA Score- 2     Anesthesia Type- general with ASA Monitors  Additional Monitors:   Airway Plan: LMA  Plan Factors- Patient instructed to abstain from smoking on day of procedure  Patient did not smoke on day of surgery  Induction- intravenous  Postoperative Plan-     Informed Consent- Anesthetic plan and risks discussed with patient and spouse  I personally reviewed this patient with the CRNA  Discussed and agreed on the Anesthesia Plan with the CRNA  Angeles Medrano

## 2019-10-28 NOTE — ANESTHESIA POSTPROCEDURE EVALUATION
Post-Op Assessment Note    CV Status:  Stable  Pain Score: 0    Pain management: adequate     Mental Status:  Sleepy   Hydration Status:  Stable   PONV Controlled:  None   Airway Patency:  Patent and adequate   Post Op Vitals Reviewed: Yes      Staff: CRNA           BP      Temp      Pulse     Resp      SpO2

## 2019-10-28 NOTE — INTERVAL H&P NOTE
H&P reviewed  After examining the patient I find no changes in the patients condition since the H&P had been written      Vitals:    10/28/19 0755   BP: 118/53   Pulse: 80   Resp: 18   Temp: 99 2 °F (37 3 °C)   SpO2: 100%

## 2019-10-28 NOTE — DISCHARGE INSTRUCTIONS
Hysteroscopy   WHAT YOU NEED TO KNOW:   A hysteroscopy is a procedure to find and treat problems in your uterus  A hysteroscopy may be done to find, and possibly treat, the cause of abnormal vaginal bleeding, problems getting pregnant, or miscarriage  It may also be done to insert or remove a device that prevents pregnancy  DISCHARGE INSTRUCTIONS:   Call 911 if:   · You have trouble breathing  Seek care immediately if:   · You have heavy vaginal bleeding that fills 1 or more sanitary pads in 1 hour  · You have severe pain or bloating in your abdomen  · You feel lightheaded, weak, and confused  · You see blood in your urine  · You stop urinating or urinate less than usual   Contact your healthcare provider if:   · You have a fever or chills  · You have pus or a foul-smelling odor coming from your vagina  · You see blood clots on your sanitary pad that are larger than the size of a quarter  · You have nausea or are vomiting  · Your skin is itchy, swollen, or you have a rash  · You have questions or concerns about your condition or care  Medicines: You may need any of the following:  · Estrogen  helps heal the lining of your uterus  · Antibiotics  help prevent a bacterial infection  · Prescription pain medicine  may be given  Ask your healthcare provider how to take this medicine safely  Some prescription pain medicines contain acetaminophen  Do not take other medicines that contain acetaminophen without talking to your healthcare provider  Too much acetaminophen may cause liver damage  Prescription pain medicine may cause constipation  Ask your healthcare provider how to prevent or treat constipation  · NSAIDs , such as ibuprofen, help decrease swelling, pain, and fever  NSAIDs can cause stomach bleeding or kidney problems in certain people  If you take blood thinner medicine, always ask your healthcare provider if NSAIDs are safe for you   Always read the medicine label and follow directions  · Take your medicine as directed  Contact your healthcare provider if you think your medicine is not helping or if you have side effects  Tell him or her if you are allergic to any medicine  Keep a list of the medicines, vitamins, and herbs you take  Include the amounts, and when and why you take them  Bring the list or the pill bottles to follow-up visits  Carry your medicine list with you in case of an emergency  Self-care:  Do not have sex, use tampons, or douche for 6 weeks  These actions may cause an infection  Ask your healthcare provider if it is okay to take a tub bath or swim  Rest as needed  Do not drive, return to work, or exercise for 24 hours or as directed  You can return to most activities in 1 to 2 days  Follow up with your healthcare provider as directed:  Write down your questions so you remember to ask them during your visits  © 2017 [employer] Information is for End User's use only and may not be sold, redistributed or otherwise used for commercial purposes  All illustrations and images included in CareNotes® are the copyrighted property of [employer]  or Everett Valencia  The above information is an  only  It is not intended as medical advice for individual conditions or treatments  Talk to your doctor, nurse or pharmacist before following any medical regimen to see if it is safe and effective for you

## 2019-10-28 NOTE — OP NOTE
OPERATIVE REPORT  PATIENT NAME: Brynn Crigler    :  1975  MRN: [id number]  Pt Location: [facility]    SURGERY DATE: 10/28/2019    Surgeon(s) and Role:     * Katrin Mohr MD - Primary     * Aguilar Coronado DO - Assisting    Preop Diagnosis:  Other specified noninflammatory disorders of uterus [N85 8]    Post-Op Diagnosis Codes:     * Other specified noninflammatory disorders of uterus [N85 8]    Procedure(s) (LRB):  OPERATIVE HYSTEROSCOPY (MYOSURE),POLYPECTOMY (N/A)    Specimen(s):  ID Type Source Tests Collected by Time Destination   1 : emc/ endometrial polyp Tissue Endometrium TISSUE EXAM Katrin Mohr MD 10/28/2019 0915        Estimated Blood Loss:   Minimal    Drains:  * No LDAs found *    Anesthesia Type:   General    Operative Indications: Other specified noninflammatory disorders of uterus [N85 8]    Operative Findings:  Grossly normal vagina and cervix  Proliferative endometrium  Endometrial polyps  Both ostia visualized    Complications:   None    Procedure and Technique:  Brief History    All risks, benefits, and alternatives to the procedure were discussed with the patient and she had the opportunity to ask questions  Informed consent was obtained  Description of Procedure    Patient was taken to the operating room were a time out was performed to confirm correct patient and correct procedure  General LMA anesthesia (LMA) was administered and the patient was positioned on the OR table in the dorsal lithotomy position  All pressure points were padded and a linda hugger was placed to maintain control of core body temperature  A bimanual exam was performed and the uterus was noted to be anteverted, normal in size and consistency with no palpable adnexal masses or fullness  The patient was prepped and draped in the usual sterile fashion  Operative Technique    A straight catheter was introduced into the bladder, which was drained of 10cc of clear yellow urine   A weighted  speculum was inserted into the vagina and a Ad retractor was used to visualize the anterior lip of the cervix, which was then grasped with a single toothed tenaculum  The uterus was sounded to 9 cm  The cervix was serially dilated to [address] [place] using Saavedra dilators for introduction of the hysteroscope  Hysteroscope was introduced under direct visualization using normal saline solution as the distention media  Hysteroscope was advanced to the uterine fundus and the entire uterine cavity was inspected in a systematic manner  There was noted to be findings as noted above  The myosure devices was inserted and use for polypectomy  Hysteroscope was withdrawn and sharp curetting was performed, starting at the 12'oclock position and rotating a total of 360 degrees to cover all surfaces  Endometrial tissue was obtained and sent for pathology  Entire uterine cavity was inspected in a systematic manner  Adequate curetting was confirmed and hysteroscopy was withdrawn  The single toothed tenaculum was removed from the anterior lip of the cervix  Good hemostasis was confirmed at the tenaculum puncture sites  Weighted speculum was then removed from the vagina  At the conclusion of the procedure, all needle, sponge, and instrument counts were noted to be correct x2  Patient tolerated the procedure well and was transferred to PACU in stable condition prior to discharge with follow up in 1-2 weeks  Dr Hannah Sears and Dr Emily Saunders  was present and participated in all key portions of the case     I was present for the entire procedure    Patient Disposition:  PACU     SIGNATURE: Keenan Posada DO  DATE: October 28, 2019  TIME: 9:22 AM

## 2019-11-20 ENCOUNTER — HOSPITAL ENCOUNTER (OUTPATIENT)
Dept: NEUROLOGY | Facility: CLINIC | Age: 44
Discharge: HOME | End: 2019-11-20
Payer: COMMERCIAL

## 2019-11-20 DIAGNOSIS — G40.219 SEIZURE DISORDER, TEMPORAL LOBE, INTRACTABLE (HCC): ICD-10-CM

## 2019-11-20 PROCEDURE — 95816 EEG AWAKE AND DROWSY: CPT

## 2019-12-11 DIAGNOSIS — G43.109 MIGRAINE WITH AURA AND WITHOUT STATUS MIGRAINOSUS, NOT INTRACTABLE: ICD-10-CM

## 2019-12-11 RX ORDER — SUMATRIPTAN 100 MG/1
100 TABLET, FILM COATED ORAL ONCE AS NEEDED
Qty: 15 TABLET | Refills: 3 | Status: SHIPPED | OUTPATIENT
Start: 2019-12-11 | End: 2020-04-30 | Stop reason: SDUPTHER

## 2019-12-12 ENCOUNTER — OFFICE VISIT (OUTPATIENT)
Dept: FAMILY MEDICINE CLINIC | Facility: CLINIC | Age: 44
End: 2019-12-12
Payer: COMMERCIAL

## 2019-12-12 VITALS
HEART RATE: 76 BPM | TEMPERATURE: 99.5 F | WEIGHT: 134 LBS | SYSTOLIC BLOOD PRESSURE: 116 MMHG | BODY MASS INDEX: 25.3 KG/M2 | RESPIRATION RATE: 20 BRPM | DIASTOLIC BLOOD PRESSURE: 60 MMHG | HEIGHT: 61 IN

## 2019-12-12 DIAGNOSIS — G40.309 GENERALIZED NONCONVULSIVE SEIZURE (HCC): ICD-10-CM

## 2019-12-12 DIAGNOSIS — J01.00 ACUTE NON-RECURRENT MAXILLARY SINUSITIS: Primary | ICD-10-CM

## 2019-12-12 PROCEDURE — 99213 OFFICE O/P EST LOW 20 MIN: CPT | Performed by: FAMILY MEDICINE

## 2019-12-12 RX ORDER — CEFDINIR 300 MG/1
600 CAPSULE ORAL DAILY
Qty: 20 CAPSULE | Refills: 0 | Status: SHIPPED | OUTPATIENT
Start: 2019-12-12 | End: 2019-12-22

## 2019-12-12 NOTE — PROGRESS NOTES
Assessment/Plan:    No problem-specific Assessment & Plan notes found for this encounter  Seizures stable  mucinex suggested with abx  F/u if no better  Sinusitis new     Diagnoses and all orders for this visit:    Acute non-recurrent maxillary sinusitis  -     cefdinir (OMNICEF) 300 mg capsule; Take 2 capsules (600 mg total) by mouth daily for 10 days    Generalized nonconvulsive seizure ([address] )        Return if symptoms worsen or fail to improve  Subjective:      Patient ID: Nancy Dougherty is a 40 y o  female  Chief Complaint   Patient presents with   Walker Davis Like Symptoms     for a couple weeks, tried multiple decongestants with no help ac/cma     Cough     with thick yellow colored phlegm     Sinus pressure    Nasal Congestion     post-nasal drip    Earache     mostly right ear        HPI  Few wks  Congestion  Failed otc  Cough  Yellow mucus  Right ear pain  Nasal congestion  pndrip  Sinus pressure  Right ear pain  Thick yellow/brown mucus recently  Ex smoker  Some mucinex still  Hoarse    The following portions of the patient's history were reviewed and updated as appropriate: allergies, current medications, past family history, past medical history, past social history, past surgical history and problem list     Review of Systems   Cardiovascular: Negative for chest pain  Neurological: Negative for dizziness           Current Outpatient Medications   Medication Sig Dispense Refill    carBAMazepine (CARBATROL) 300 MG 12 hr capsule 300 mg every 12 (twelve) hours   1    ibuprofen (MOTRIN) 200 mg tablet Take 200 mg by mouth every 6 (six) hours as needed for mild pain       SUMAtriptan (IMITREX) 100 mg tablet Take 1 tablet (100 mg total) by mouth once as needed for migraine for up to 1 dose 15 tablet 3    valACYclovir (VALTREX) 500 mg tablet 500 mg daily after dinner   2    cefdinir (OMNICEF) 300 mg capsule Take 2 capsules (600 mg total) by mouth daily for 10 days 20 capsule 0     No current facility-administered medications for this visit  Objective:    /60   Pulse 76   Temp 99 5 °F (37 5 °C)   Resp 20   Ht 5' 1" (1 549 m)   Wt 60 8 kg (134 lb)   BMI 25 32 kg/m²        Physical Exam   Constitutional: She appears well-developed  No distress  HENT:   Head: Normocephalic  Right Ear: External ear normal    Left Ear: External ear normal    Mouth/Throat: No oropharyngeal exudate  Sinuses tender to percussion, nasal turbinates visualized and appear red and swollen     Eyes: Conjunctivae are normal    Neck: Neck supple  Cardiovascular: Normal rate, regular rhythm and intact distal pulses  Pulmonary/Chest: Effort normal and breath sounds normal  No respiratory distress  Abdominal: Soft  Bowel sounds are normal  She exhibits no distension  Musculoskeletal: She exhibits no edema or deformity  Lymphadenopathy:     She has no cervical adenopathy  Neurological: She is alert  Skin: Skin is warm and dry  No pallor  Psychiatric: Her behavior is normal  Thought content normal    Nursing note and vitals reviewed               Dariel Salazar DO

## 2019-12-19 ENCOUNTER — OFFICE VISIT (OUTPATIENT)
Dept: FAMILY MEDICINE CLINIC | Facility: CLINIC | Age: 44
End: 2019-12-19
Payer: COMMERCIAL

## 2019-12-19 VITALS
OXYGEN SATURATION: 99 % | SYSTOLIC BLOOD PRESSURE: 150 MMHG | BODY MASS INDEX: 25.3 KG/M2 | TEMPERATURE: 98.2 F | WEIGHT: 134 LBS | RESPIRATION RATE: 16 BRPM | HEART RATE: 98 BPM | HEIGHT: 61 IN | DIASTOLIC BLOOD PRESSURE: 100 MMHG

## 2019-12-19 DIAGNOSIS — S16.1XXA STRAIN OF NECK MUSCLE, INITIAL ENCOUNTER: Primary | ICD-10-CM

## 2019-12-19 PROBLEM — J06.9 VIRAL UPPER RESPIRATORY TRACT INFECTION: Status: RESOLVED | Noted: 2018-08-13 | Resolved: 2019-12-19

## 2019-12-19 PROCEDURE — 99213 OFFICE O/P EST LOW 20 MIN: CPT | Performed by: NURSE PRACTITIONER

## 2019-12-19 PROCEDURE — 1036F TOBACCO NON-USER: CPT | Performed by: NURSE PRACTITIONER

## 2019-12-19 PROCEDURE — 3008F BODY MASS INDEX DOCD: CPT | Performed by: NURSE PRACTITIONER

## 2019-12-19 RX ORDER — CYCLOBENZAPRINE HCL 10 MG
10 TABLET ORAL 3 TIMES DAILY PRN
Qty: 20 TABLET | Refills: 0 | Status: SHIPPED | OUTPATIENT
Start: 2019-12-19 | End: 2020-09-18

## 2019-12-19 RX ORDER — PREDNISONE 50 MG/1
50 TABLET ORAL DAILY
Qty: 5 TABLET | Refills: 0 | Status: SHIPPED | OUTPATIENT
Start: 2019-12-19 | End: 2020-09-18 | Stop reason: ALTCHOICE

## 2019-12-19 NOTE — PROGRESS NOTES
Assessment/Plan:    Recommended heat in addition to medications prescribed below  Advised not to drive or operate machinery while taking a muscle relaxant  Can consider extending Prednisone taper if needed  To call with any changes in symptoms    1  Strain of neck muscle, initial encounter  -     cyclobenzaprine (FLEXERIL) 10 mg tablet; Take 1 tablet (10 mg total) by mouth 3 (three) times a day as needed for muscle spasms  -     predniSONE 50 mg tablet; Take 1 tablet (50 mg total) by mouth daily            There are no Patient Instructions on file for this visit  Return if symptoms worsen or fail to improve  Subjective:      Patient ID: Jaylin Orona is a 40 y o  female  Chief Complaint   Patient presents with    Neck Pain     right side-[facility]       Here today with complaints of neck pain  She has been sick with cold symptoms and thinks she pulled a muscle from coughing  This morning she woke up with worsening pain  She has been taking Ibuprofen and also tried a dose of her Imitrex  Has also taken Naproxen, none of which have helped  She is afraid to apply heat, because that has triggered her migraines  The following portions of the patient's history were reviewed and updated as appropriate: allergies, current medications, past family history, past medical history, past social history, past surgical history and problem list     Review of Systems   Constitutional: Negative  Musculoskeletal: Positive for neck pain  Neurological: Positive for headaches  All other systems reviewed and are negative          Current Outpatient Medications   Medication Sig Dispense Refill    carBAMazepine (CARBATROL) 300 MG 12 hr capsule 300 mg every 12 (twelve) hours   1    cefdinir (OMNICEF) 300 mg capsule Take 2 capsules (600 mg total) by mouth daily for 10 days 20 capsule 0    ibuprofen (MOTRIN) 200 mg tablet Take 200 mg by mouth every 6 (six) hours as needed for mild pain       SUMAtriptan (IMITREX) 100 mg tablet Take 1 tablet (100 mg total) by mouth once as needed for migraine for up to 1 dose 15 tablet 3    valACYclovir (VALTREX) 500 mg tablet 500 mg daily after dinner   2    cyclobenzaprine (FLEXERIL) 10 mg tablet Take 1 tablet (10 mg total) by mouth 3 (three) times a day as needed for muscle spasms 20 tablet 0    predniSONE 50 mg tablet Take 1 tablet (50 mg total) by mouth daily 5 tablet 0     No current facility-administered medications for this visit  Objective:    /100   Pulse 98   Temp 98 2 °F (36 8 °C)   Resp 16   Ht 5' 1" (1 549 m)   Wt 60 8 kg (134 lb)   LMP 12/03/2019   SpO2 99%   BMI 25 32 kg/m²        Physical Exam   Constitutional: She appears well-developed and well-nourished  Cardiovascular: Normal rate, regular rhythm and normal heart sounds  No murmur heard  Pulmonary/Chest: Effort normal and breath sounds normal    Musculoskeletal:   Right cervical paraspinal muscles tender to light and deep palpation   Neurological: She is alert  Skin: Skin is warm and dry  Psychiatric: She has a normal mood and affect  Nursing note and vitals reviewed               Laura Keith

## 2019-12-19 NOTE — LETTER
December 19, 2019     Patient: Kristine Duran   YOB: 1975   Date of Visit: 12/19/2019       To Whom it May Concern:    Milka Vigil is under my professional care  She was seen in my office on 12/19/2019  Please excuse from work 12/19/19    If you have any questions or concerns, please don't hesitate to call           Sincerely,          Conception GAGANDEEP Jasso        CC: No Recipients

## 2020-04-06 ENCOUNTER — TELEMEDICINE (OUTPATIENT)
Dept: FAMILY MEDICINE CLINIC | Facility: CLINIC | Age: 45
End: 2020-04-06
Payer: COMMERCIAL

## 2020-04-06 DIAGNOSIS — R10.2 PELVIC PRESSURE IN FEMALE: ICD-10-CM

## 2020-04-06 DIAGNOSIS — R39.89 URINARY PROBLEM: Primary | ICD-10-CM

## 2020-04-06 PROCEDURE — 99214 OFFICE O/P EST MOD 30 MIN: CPT | Performed by: FAMILY MEDICINE

## 2020-04-06 RX ORDER — NITROFURANTOIN 25; 75 MG/1; MG/1
100 CAPSULE ORAL 2 TIMES DAILY
Qty: 10 CAPSULE | Refills: 0 | Status: SHIPPED | OUTPATIENT
Start: 2020-04-06 | End: 2020-04-11

## 2020-04-30 DIAGNOSIS — G43.109 MIGRAINE WITH AURA AND WITHOUT STATUS MIGRAINOSUS, NOT INTRACTABLE: ICD-10-CM

## 2020-04-30 RX ORDER — SUMATRIPTAN 100 MG/1
100 TABLET, FILM COATED ORAL ONCE AS NEEDED
Qty: 15 TABLET | Refills: 3 | Status: SHIPPED | OUTPATIENT
Start: 2020-04-30 | End: 2020-08-17 | Stop reason: SDUPTHER

## 2020-08-04 ENCOUNTER — TELEMEDICINE (OUTPATIENT)
Dept: FAMILY MEDICINE CLINIC | Facility: CLINIC | Age: 45
End: 2020-08-04
Payer: COMMERCIAL

## 2020-08-04 ENCOUNTER — TELEPHONE (OUTPATIENT)
Dept: ADMINISTRATIVE | Facility: OTHER | Age: 45
End: 2020-08-04

## 2020-08-04 DIAGNOSIS — J30.2 SEASONAL ALLERGIES: Primary | ICD-10-CM

## 2020-08-04 PROCEDURE — 99213 OFFICE O/P EST LOW 20 MIN: CPT | Performed by: FAMILY MEDICINE

## 2020-08-04 PROCEDURE — 3725F SCREEN DEPRESSION PERFORMED: CPT | Performed by: FAMILY MEDICINE

## 2020-08-04 NOTE — LETTER
Procedure Request Form: Cervical Cancer Screening      Date Requested: 20  Patient: Caro Bill  Patient : 1975   Referring Provider: Mere Hy, DO        Date of Procedure ______________________________       The above patient has informed us that they have completed their   most recent Cervical Cancer Screening at your facility  Please complete   this form and attach all corresponding procedure reports/results  Comments __________________________________________________________  ____________________________________________________________________  ____________________________________________________________________  ____________________________________________________________________    Facility Completing Procedure _________________________________________    Form Completed By (print name) _______________________________________      Signature __________________________________________________________      These reports are needed for  compliance    Please fax this completed form and a copy of the procedure report to our office located at [place] [address] as soon as possible to [phone] darlene Bey: Phone [phone]    We thank you for your assistance in treating our mutual patient

## 2020-08-04 NOTE — PROGRESS NOTES
Virtual Brief Visit    Assessment/Plan:    Problem List Items Addressed This Visit     None      Visit Diagnoses     Seasonal allergies    -  Primary        Discussed with pt that her itchy/burning eyes, itchy nose, scratchy throat, sneezing, congestion, post nasal drip and mild cough are likely due to allergies and at this time she does not need COVID-19 testing  Discussed supportive care including hydration,rest  Continue flonase, nasal saline, zyrtec  If she does develop new or worsening symptoms, told to call back for further evaluation  Reason for visit is No chief complaint on file  Encounter provider Shay James MD    Provider located at  [address]  [address] [place] [address]  [address]  [place] [address]    Recent Visits  No visits were found meeting these conditions  Showing recent visits within past 7 days and meeting all other requirements     Future Appointments  No visits were found meeting these conditions  Showing future appointments within next 150 days and meeting all other requirements        After connecting through telephone, the patient was identified by name and date of birth  Nathen Oneil was informed that this is a telemedicine visit and that the visit is being conducted through telephone  My office door was closed  No one else was in the room  She acknowledged consent and understanding of privacy and security of the platform  The patient has agreed to participate and understands she can discontinue the visit at any time  Patient is aware this is a billable service  Vero Wiggins is a 40 y o  female  HPI   Areli Marrero is a 39 yo F with hx of chronic seasonal allergies who presents today with c/o burning eyes, itchy nose, scratchy throat, mild cough, post nasal drip and fatigue  Pt is concerned about COVID-19 due to her cough and would like to know if she should get tested   Denies fevers, chills, SOB, myalgias, abdominal pain, nausea, vomiting, anosmia  Works for the [employer] and has been going to [employer], but always maintains 6 ft distance and wears mask  Has not had any exposure to persons with COVID-19, but daughter has similar allergy like symptoms  Uses Flonase and Zyrtec which seem to be helping  Past Medical History:   Diagnosis Date    Cervical radiculopathy     RESOLVED: [id number] buldging disc    Kidney stone     Seizure [facility])     age 25       Past Surgical History:   Procedure Laterality Date    ADENOIDECTOMY      COLONOSCOPY      LITHOTRIPSY  2006    RENAL    OVARIAN CYST REMOVAL  2012    HI HYSTEROSCOPY,W/ENDO BX N/A 10/28/2019    Procedure: OPERATIVE HYSTEROSCOPY (MYOSURE),POLYPECTOMY,D AND C;  Surgeon: Joaquina Grace MD;  [facility];  Service: Gynecology       Current Outpatient Medications   Medication Sig Dispense Refill    carBAMazepine (CARBATROL) 300 MG 12 hr capsule 300 mg every 12 (twelve) hours   1    cyclobenzaprine (FLEXERIL) 10 mg tablet Take 1 tablet (10 mg total) by mouth 3 (three) times a day as needed for muscle spasms 20 tablet 0    ibuprofen (MOTRIN) 200 mg tablet Take 200 mg by mouth every 6 (six) hours as needed for mild pain       predniSONE 50 mg tablet Take 1 tablet (50 mg total) by mouth daily 5 tablet 0    SUMAtriptan (IMITREX) 100 mg tablet Take 1 tablet (100 mg total) by mouth once as needed for migraine for up to 1 dose 15 tablet 3    valACYclovir (VALTREX) 500 mg tablet 500 mg daily after dinner   2     No current facility-administered medications for this visit  Allergies   Allergen Reactions    Ciprofloxacin Lightheadedness     dizzy    Phenytoin Rash     Reaction Date: [id number];        Review of Systems   Constitutional: Negative for activity change, appetite change, chills, diaphoresis, fatigue and fever  HENT: Positive for congestion, postnasal drip, sneezing and sore throat  Eyes: Positive for pain and itching  Respiratory: Positive for cough  Negative for choking, chest tightness, shortness of breath and wheezing  Cardiovascular: Negative for chest pain, palpitations and leg swelling  Gastrointestinal: Negative for abdominal distention, abdominal pain, constipation, diarrhea, nausea and vomiting  Genitourinary: Negative for difficulty urinating, dyspareunia, dysuria, enuresis, flank pain, frequency, menstrual problem, pelvic pain and urgency  Musculoskeletal: Negative for arthralgias, back pain, gait problem, joint swelling, myalgias, neck pain and neck stiffness  Skin: Negative  Neurological: Negative for dizziness, tremors, syncope, facial asymmetry, weakness, light-headedness, numbness and headaches  Psychiatric/Behavioral: Negative  There were no vitals filed for this visit  I spent 15 minutes directly with the patient during this visit    Pichardo Proc  [address] acknowledges that she has consented to an online visit or consultation  She understands that the online visit is based solely on information provided by her, and that, in the absence of a face-to-face physical evaluation by the physician, the diagnosis she receives is both limited and provisional in terms of accuracy and completeness  This is not intended to replace a full medical face-to-face evaluation by the physician  Karolina Chinchilla understands and accepts these terms

## 2020-08-04 NOTE — TELEPHONE ENCOUNTER
----- Message from Hailey Ocampo MD sent at 8/4/2020  2:21 PM EDT -----  Hello,   Pt had her pap smear done at [facility] in [place], [place] in the past 1-2 months  Can we please obtain records     Thank you,   Dr Lili Aguirre

## 2020-08-17 ENCOUNTER — TELEPHONE (OUTPATIENT)
Dept: FAMILY MEDICINE CLINIC | Facility: CLINIC | Age: 45
End: 2020-08-17

## 2020-08-17 DIAGNOSIS — G43.109 MIGRAINE WITH AURA AND WITHOUT STATUS MIGRAINOSUS, NOT INTRACTABLE: ICD-10-CM

## 2020-08-17 NOTE — TELEPHONE ENCOUNTER
Upon review of the In Basket request we were able to locate, review, and update the patient chart as requested for Pap Smear (HPV) aka Cervical Cancer Screening  Any additional questions or concerns should be emailed to the Practice Liaisons via [email or web address] email, please do not reply via In Basket      Thank you  Kateryna Venegas MA

## 2020-08-17 NOTE — TELEPHONE ENCOUNTER
Patient scheduled another virtual appt with Dr Porsche Newell for tomorrow to address cough, diarrhea and other symptoms which have been ongoing for some time

## 2020-08-18 ENCOUNTER — TELEMEDICINE (OUTPATIENT)
Dept: FAMILY MEDICINE CLINIC | Facility: CLINIC | Age: 45
End: 2020-08-18
Payer: COMMERCIAL

## 2020-08-18 DIAGNOSIS — R11.0 NAUSEA: ICD-10-CM

## 2020-08-18 DIAGNOSIS — R19.7 DIARRHEA, UNSPECIFIED TYPE: ICD-10-CM

## 2020-08-18 DIAGNOSIS — Z20.828 EXPOSURE TO SARS-ASSOCIATED CORONAVIRUS: ICD-10-CM

## 2020-08-18 DIAGNOSIS — J06.9 ACUTE URI: Primary | ICD-10-CM

## 2020-08-18 PROCEDURE — U0003 INFECTIOUS AGENT DETECTION BY NUCLEIC ACID (DNA OR RNA); SEVERE ACUTE RESPIRATORY SYNDROME CORONAVIRUS 2 (SARS-COV-2) (CORONAVIRUS DISEASE [COVID-19]), AMPLIFIED PROBE TECHNIQUE, MAKING USE OF HIGH THROUGHPUT TECHNOLOGIES AS DESCRIBED BY CMS-2020-01-R: HCPCS | Performed by: FAMILY MEDICINE

## 2020-08-18 PROCEDURE — 99214 OFFICE O/P EST MOD 30 MIN: CPT | Performed by: FAMILY MEDICINE

## 2020-08-18 RX ORDER — AMOXICILLIN 875 MG/1
875 TABLET, COATED ORAL 2 TIMES DAILY
Qty: 14 TABLET | Refills: 0 | Status: SHIPPED | OUTPATIENT
Start: 2020-08-18 | End: 2020-08-25

## 2020-08-18 RX ORDER — SUMATRIPTAN 100 MG/1
100 TABLET, FILM COATED ORAL ONCE AS NEEDED
Qty: 15 TABLET | Refills: 0 | Status: SHIPPED | OUTPATIENT
Start: 2020-08-18 | End: 2020-09-18 | Stop reason: SDUPTHER

## 2020-08-18 NOTE — PROGRESS NOTES
Virtual Brief Visit    Assessment/Plan:    Problem List Items Addressed This Visit     None      Visit Diagnoses     Acute URI    -  Primary    Relevant Medications    amoxicillin (AMOXIL) 875 mg tablet    Diarrhea, unspecified type        Nausea        Exposure to SARS-associated coronavirus        Relevant Orders    Novel Coronavirus (COVID-19), PCR [facility]        Counseled on supportive care and quarantine  Reason for visit is   Chief Complaint   Patient presents with    Virtual Brief Visit        Encounter provider Jaden Leary MD    Provider located at  O  [address] [address]    Recent Visits  No visits were found meeting these conditions  Showing recent visits within past 7 days and meeting all other requirements     Today's Visits  Date Type Provider Dept   08/18/20 Telemedicine Jaden Leary, [address] [place] [address] today's visits and meeting all other requirements     Future Appointments  No visits were found meeting these conditions  Showing future appointments within next 150 days and meeting all other requirements        After connecting through telephone, the patient was identified by name and date of birth  Victorina Pineda was informed that this is a telemedicine visit and that the visit is being conducted through telephone  My office door was closed  No one else was in the room  She acknowledged consent and understanding of privacy and security of the platform  The patient has agreed to participate and understands she can discontinue the visit at any time  Patient is aware this is a billable service  Margaret Hart is a 40 y o  female  HPI   Lisa Squires presents today for f/u of allergy/URI symptoms  Pt was counseled on supportive care last week, but states that the symptoms are continuing and getting worse   Complains of dry cough, runny nose, congestion, sneezing  Was taking Zyrtec and Flonase and initially symptoms improved, but returned a few days later  Now her symptoms are associated with fatigue, diarrhea, nausea, sinus pain/pressure  She has been feeling hot/cold, but denies any fevers  Has not had any exposure to sick contacts including anyone with COVID-19  Denies recent travel or hospitalization  Past Medical History:   Diagnosis Date    Cervical radiculopathy     RESOLVED: [id number] buldging disc    Kidney stone     Seizure [facility])     age 25       Past Surgical History:   Procedure Laterality Date    ADENOIDECTOMY      COLONOSCOPY      LITHOTRIPSY  2006    RENAL    OVARIAN CYST REMOVAL  2012    [place] HYSTEROSCOPY,W/ENDO BX N/A 10/28/2019    Procedure: OPERATIVE HYSTEROSCOPY (MYOSURE),POLYPECTOMY,D AND C;  Surgeon: Shikha Lyons MD;  [facility];  Service: Gynecology       Current Outpatient Medications   Medication Sig Dispense Refill    amoxicillin (AMOXIL) 875 mg tablet Take 1 tablet (875 mg total) by mouth 2 (two) times a day for 7 days 14 tablet 0    carBAMazepine (CARBATROL) 300 MG 12 hr capsule 300 mg every 12 (twelve) hours   1    cyclobenzaprine (FLEXERIL) 10 mg tablet Take 1 tablet (10 mg total) by mouth 3 (three) times a day as needed for muscle spasms 20 tablet 0    ibuprofen (MOTRIN) 200 mg tablet Take 200 mg by mouth every 6 (six) hours as needed for mild pain       predniSONE 50 mg tablet Take 1 tablet (50 mg total) by mouth daily 5 tablet 0    SUMAtriptan (IMITREX) 100 mg tablet Take 1 tablet (100 mg total) by mouth once as needed for migraine for up to 1 dose 15 tablet 3    valACYclovir (VALTREX) 500 mg tablet 500 mg daily after dinner   2     No current facility-administered medications for this visit  Allergies   Allergen Reactions    Ciprofloxacin Lightheadedness     dizzy    Phenytoin Rash     Reaction Date: [id number];        Review of Systems   Constitutional: Positive for activity change and fatigue  Negative for appetite change, chills, diaphoresis and fever  HENT: Positive for congestion, postnasal drip, rhinorrhea, sinus pressure, sinus pain, sneezing and sore throat  Respiratory: Positive for cough  Negative for choking, chest tightness, shortness of breath and wheezing  Cardiovascular: Negative for chest pain, palpitations and leg swelling  Gastrointestinal: Positive for diarrhea and nausea  Negative for abdominal distention, abdominal pain, constipation and vomiting  Genitourinary: Negative for difficulty urinating, dyspareunia, dysuria, enuresis, flank pain, frequency, menstrual problem, pelvic pain and urgency  Musculoskeletal: Negative for arthralgias, back pain, gait problem, joint swelling, myalgias, neck pain and neck stiffness  Skin: Negative  Neurological: Negative for dizziness, tremors, syncope, facial asymmetry, weakness, light-headedness, numbness and headaches  Psychiatric/Behavioral: Negative  There were no vitals filed for this visit  I spent 15 minutes directly with the patient during this visit    Pichardo Proc  Vu Toni 1 acknowledges that she has consented to an online visit or consultation  She understands that the online visit is based solely on information provided by her, and that, in the absence of a face-to-face physical evaluation by the physician, the diagnosis she receives is both limited and provisional in terms of accuracy and completeness  This is not intended to replace a full medical face-to-face evaluation by the physician  Abrahan Alejandro understands and accepts these terms

## 2020-08-18 NOTE — TELEPHONE ENCOUNTER
She is still due for a CPE  Dr Markos Nelson is her PCP who last saw her on 7/30/19  SHe has been seen since then, but they were acute visits only    Jonn Red

## 2020-08-19 LAB — SARS-COV-2 RNA SPEC QL NAA+PROBE: NOT DETECTED

## 2020-09-08 ENCOUNTER — TELEPHONE (OUTPATIENT)
Dept: FAMILY MEDICINE CLINIC | Facility: CLINIC | Age: 45
End: 2020-09-08

## 2020-09-08 DIAGNOSIS — Z13.29 SCREENING FOR THYROID DISORDER: ICD-10-CM

## 2020-09-08 DIAGNOSIS — Z13.6 SCREENING FOR CARDIOVASCULAR, RESPIRATORY, AND GENITOURINARY DISEASES: Primary | ICD-10-CM

## 2020-09-08 DIAGNOSIS — Z11.4 SCREENING FOR HIV (HUMAN IMMUNODEFICIENCY VIRUS): ICD-10-CM

## 2020-09-08 DIAGNOSIS — E78.2 HYPERLIPEMIA, MIXED: ICD-10-CM

## 2020-09-08 DIAGNOSIS — Z13.89 SCREENING FOR CARDIOVASCULAR, RESPIRATORY, AND GENITOURINARY DISEASES: Primary | ICD-10-CM

## 2020-09-08 DIAGNOSIS — Z13.83 SCREENING FOR CARDIOVASCULAR, RESPIRATORY, AND GENITOURINARY DISEASES: Primary | ICD-10-CM

## 2020-09-08 NOTE — TELEPHONE ENCOUNTER
Has an apt physical next week with DR Ruiz    Can she order routine bloodwork and please leave up front for     Thank you

## 2020-09-10 LAB
ALBUMIN SERPL-MCNC: 4.4 G/DL (ref 3.8–4.8)
ALBUMIN/GLOB SERPL: 2 {RATIO} (ref 1.2–2.2)
ALP SERPL-CCNC: 82 IU/L (ref 39–117)
ALT SERPL-CCNC: 16 IU/L (ref 0–32)
AST SERPL-CCNC: 19 IU/L (ref 0–40)
BASOPHILS # BLD AUTO: 0.1 X10E3/UL (ref 0–0.2)
BASOPHILS NFR BLD AUTO: 1 %
BILIRUB SERPL-MCNC: 0.3 MG/DL (ref 0–1.2)
BUN SERPL-MCNC: 11 MG/DL (ref 6–24)
BUN/CREAT SERPL: 16 (ref 9–23)
CALCIUM SERPL-MCNC: 9 MG/DL (ref 8.7–10.2)
CHLORIDE SERPL-SCNC: 101 MMOL/L (ref 96–106)
CHOLEST SERPL-MCNC: 273 MG/DL (ref 100–199)
CO2 SERPL-SCNC: 26 MMOL/L (ref 20–29)
CREAT SERPL-MCNC: 0.67 MG/DL (ref 0.57–1)
EOSINOPHIL # BLD AUTO: 0.1 X10E3/UL (ref 0–0.4)
EOSINOPHIL NFR BLD AUTO: 1 %
ERYTHROCYTE [DISTWIDTH] IN BLOOD BY AUTOMATED COUNT: 11.3 % (ref 11.7–15.4)
GLOBULIN SER-MCNC: 2.2 G/DL (ref 1.5–4.5)
GLUCOSE SERPL-MCNC: 87 MG/DL (ref 65–99)
HCT VFR BLD AUTO: 38.3 % (ref 34–46.6)
HDLC SERPL-MCNC: 96 MG/DL
HGB BLD-MCNC: 13.5 G/DL (ref 11.1–15.9)
HIV 1+2 AB+HIV1 P24 AG SERPL QL IA: NON REACTIVE
IMM GRANULOCYTES # BLD: 0 X10E3/UL (ref 0–0.1)
IMM GRANULOCYTES NFR BLD: 0 %
LDLC SERPL CALC-MCNC: 163 MG/DL (ref 0–99)
LYMPHOCYTES # BLD AUTO: 2.4 X10E3/UL (ref 0.7–3.1)
LYMPHOCYTES NFR BLD AUTO: 46 %
MCH RBC QN AUTO: 32.2 PG (ref 26.6–33)
MCHC RBC AUTO-ENTMCNC: 35.2 G/DL (ref 31.5–35.7)
MCV RBC AUTO: 91 FL (ref 79–97)
MICRODELETION SYND BLD/T FISH: NORMAL
MONOCYTES # BLD AUTO: 0.4 X10E3/UL (ref 0.1–0.9)
MONOCYTES NFR BLD AUTO: 7 %
NEUTROPHILS # BLD AUTO: 2.3 X10E3/UL (ref 1.4–7)
NEUTROPHILS NFR BLD AUTO: 45 %
PLATELET # BLD AUTO: 243 X10E3/UL (ref 150–450)
POTASSIUM SERPL-SCNC: 4.4 MMOL/L (ref 3.5–5.2)
PROT SERPL-MCNC: 6.6 G/DL (ref 6–8.5)
RBC # BLD AUTO: 4.19 X10E6/UL (ref 3.77–5.28)
SL AMB EGFR AFRICAN AMERICAN: 124 ML/MIN/1.73
SL AMB EGFR NON AFRICAN AMERICAN: 107 ML/MIN/1.73
SODIUM SERPL-SCNC: 140 MMOL/L (ref 134–144)
TRIGL SERPL-MCNC: 87 MG/DL (ref 0–149)
TSH SERPL DL<=0.005 MIU/L-ACNC: 0.8 UIU/ML (ref 0.45–4.5)
WBC # BLD AUTO: 5.1 X10E3/UL (ref 3.4–10.8)

## 2020-09-18 ENCOUNTER — OFFICE VISIT (OUTPATIENT)
Dept: FAMILY MEDICINE CLINIC | Facility: CLINIC | Age: 45
End: 2020-09-18
Payer: COMMERCIAL

## 2020-09-18 ENCOUNTER — APPOINTMENT (OUTPATIENT)
Dept: RADIOLOGY | Facility: CLINIC | Age: 45
End: 2020-09-18
Payer: COMMERCIAL

## 2020-09-18 VITALS
SYSTOLIC BLOOD PRESSURE: 102 MMHG | WEIGHT: 130 LBS | DIASTOLIC BLOOD PRESSURE: 68 MMHG | HEIGHT: 61 IN | HEART RATE: 95 BPM | BODY MASS INDEX: 24.55 KG/M2 | OXYGEN SATURATION: 97 % | TEMPERATURE: 99.3 F | RESPIRATION RATE: 16 BRPM

## 2020-09-18 DIAGNOSIS — Z12.39 SCREENING FOR BREAST CANCER: ICD-10-CM

## 2020-09-18 DIAGNOSIS — E78.2 HYPERLIPEMIA, MIXED: ICD-10-CM

## 2020-09-18 DIAGNOSIS — R05.3 CHRONIC COUGH: ICD-10-CM

## 2020-09-18 DIAGNOSIS — G43.109 MIGRAINE WITH AURA AND WITHOUT STATUS MIGRAINOSUS, NOT INTRACTABLE: ICD-10-CM

## 2020-09-18 DIAGNOSIS — Z00.00 WELL ADULT EXAM: Primary | ICD-10-CM

## 2020-09-18 PROBLEM — E88.81 METABOLIC SYNDROME X: Status: RESOLVED | Noted: 2018-08-03 | Resolved: 2020-09-18

## 2020-09-18 PROBLEM — N92.6 IRREGULAR PERIODS: Status: RESOLVED | Noted: 2018-08-03 | Resolved: 2020-09-18

## 2020-09-18 PROBLEM — N63.0 BREAST LUMP: Status: RESOLVED | Noted: 2018-08-03 | Resolved: 2020-09-18

## 2020-09-18 PROBLEM — T50.905A INTRACTABLE HEADACHE CAUSED BY DRUG: Status: RESOLVED | Noted: 2017-05-23 | Resolved: 2020-09-18

## 2020-09-18 PROBLEM — G43.719 INTRACTABLE CHRONIC MIGRAINE WITHOUT AURA: Status: RESOLVED | Noted: 2017-05-23 | Resolved: 2020-09-18

## 2020-09-18 PROBLEM — G44.41 INTRACTABLE HEADACHE CAUSED BY DRUG: Status: RESOLVED | Noted: 2017-05-23 | Resolved: 2020-09-18

## 2020-09-18 PROBLEM — K64.8 INTERNAL HEMORRHOIDS: Status: RESOLVED | Noted: 2018-08-03 | Resolved: 2020-09-18

## 2020-09-18 PROBLEM — E88.810 METABOLIC SYNDROME X: Status: RESOLVED | Noted: 2018-08-03 | Resolved: 2020-09-18

## 2020-09-18 PROBLEM — N92.0 POLYMENORRHEA: Status: RESOLVED | Noted: 2018-08-03 | Resolved: 2020-09-18

## 2020-09-18 PROCEDURE — 99396 PREV VISIT EST AGE 40-64: CPT | Performed by: FAMILY MEDICINE

## 2020-09-18 PROCEDURE — 3725F SCREEN DEPRESSION PERFORMED: CPT | Performed by: FAMILY MEDICINE

## 2020-09-18 PROCEDURE — 71046 X-RAY EXAM CHEST 2 VIEWS: CPT

## 2020-09-18 PROCEDURE — 1036F TOBACCO NON-USER: CPT | Performed by: FAMILY MEDICINE

## 2020-09-18 RX ORDER — FLUTICASONE PROPIONATE 50 MCG
1 SPRAY, SUSPENSION (ML) NASAL DAILY PRN
COMMUNITY

## 2020-09-18 RX ORDER — SUMATRIPTAN 100 MG/1
100 TABLET, FILM COATED ORAL ONCE AS NEEDED
Qty: 15 TABLET | Refills: 0 | Status: SHIPPED | OUTPATIENT
Start: 2020-09-18 | End: 2020-10-22 | Stop reason: SDUPTHER

## 2020-09-18 RX ORDER — FEXOFENADINE HCL AND PSEUDOEPHEDRINE HCI 60; 120 MG/1; MG/1
1 TABLET, EXTENDED RELEASE ORAL AS NEEDED
COMMUNITY
End: 2022-01-14

## 2020-09-18 RX ORDER — ATORVASTATIN CALCIUM 10 MG/1
10 TABLET, FILM COATED ORAL DAILY
Qty: 90 TABLET | Refills: 0 | Status: SHIPPED | OUTPATIENT
Start: 2020-09-18 | End: 2020-12-21

## 2020-09-18 NOTE — PROGRESS NOTES
FAMILY PRACTICE HEALTH MAINTENANCE OFFICE VISIT  [facility]    NAME: Natalie Naylor  AGE: 39 y o  SEX: female  : 1975     DATE: 2020    Assessment and Plan     1  Well adult exam    2  Chronic cough  Comments:  Pt with persistent intermittent cough, not improving  Orders:  -     XR chest pa & lateral; Future; Expected date: 2020    3  Hyperlipemia, mixed  Comments:  counseled on diet/exercise modifications  Orders:  -     atorvastatin (LIPITOR) 10 mg tablet; Take 1 tablet (10 mg total) by mouth daily  -     Comprehensive metabolic panel; Future; Expected date: 2021  -     Lipid Panel with Direct LDL reflex; Future; Expected date: 2021    4  Screening for breast cancer  -     Mammo screening bilateral w cad; Future; Expected date: 2020    5  Migraine with aura and without status migrainosus, not intractable  -     SUMAtriptan (IMITREX) 100 mg tablet; Take 1 tablet (100 mg total) by mouth once as needed for migraine for up to 1 dose        · Patient Counseling:   · Nutrition: Stressed importance of a well balanced diet, moderation of sodium/saturated fat, caloric balance and sufficient intake of fiber  · Exercise: Stressed the importance of regular exercise with a goal of 150 minutes per week  · Dental Health: Discussed daily flossing and brushing and regular dental visits   · Immunizations reviewed: up to date  · Discussed benefits of:  Mammogram     BMI Counseling: Body mass index is 24 4 kg/m²  Discussed with patient's BMI with her  The BMI is normal      Return in about 6 months (around 3/18/2021) for Recheck          Chief Complaint     Chief Complaint   Patient presents with    Physical Exam     [id number]        History of Present Illness     HPI    Well Adult Physical   Patient here for a comprehensive physical exam       Diet and Physical Activity  Diet: well balanced diet  Exercise: infrequently      Depression Screen  PHQ-9 Depression Screening    PHQ-9:    Frequency of the following problems over the past two weeks:       Little interest or pleasure in doing things:  0 - not at all  Feeling down, depressed, or hopeless:  0 - not at all  PHQ-2 Score:  0          General Health  Hearing: Normal:  bilateral  Vision: no vision problems  Dental: regular dental visits, brushes teeth twice daily and flosses teeth occasionally    Reproductive Health  Regular Periods and Follows with gynecologist      The following portions of the patient's history were reviewed and updated as appropriate: allergies, current medications, past family history, past medical history, past social history, past surgical history and problem list     Review of Systems     Review of Systems   Constitutional: Negative for activity change, appetite change, chills, diaphoresis, fatigue and fever  HENT: Negative  Respiratory: Positive for cough  Negative for choking, chest tightness, shortness of breath and wheezing  Cardiovascular: Negative for chest pain, palpitations and leg swelling  Gastrointestinal: Negative for abdominal distention, abdominal pain, constipation, diarrhea, nausea and vomiting  Genitourinary: Negative for difficulty urinating, dyspareunia, dysuria, enuresis, flank pain, frequency, menstrual problem, pelvic pain and urgency  Musculoskeletal: Negative for arthralgias, back pain, gait problem, joint swelling, myalgias, neck pain and neck stiffness  Skin: Negative  Neurological: Negative for dizziness, tremors, syncope, facial asymmetry, weakness, light-headedness, numbness and headaches  Psychiatric/Behavioral: Negative          Past Medical History     Past Medical History:   Diagnosis Date    Cervical radiculopathy     RESOLVED: [id number] buldging disc    Kidney stone     Seizure [facility])     age 25       Past Surgical History     Past Surgical History:   Procedure Laterality Date    ADENOIDECTOMY      COLONOSCOPY      LITHOTRIPSY  2006    RENAL    OVARIAN CYST REMOVAL  2012    [place] HYSTEROSCOPY,W/ENDO BX N/A 10/28/2019    Procedure: OPERATIVE HYSTEROSCOPY (MYOSURE),POLYPECTOMY,D AND C;  Surgeon: Laverne Casey MD;  [facility];  Service: Gynecology       Social History     Social History     Socioeconomic History    Marital status: /Civil Union     Spouse name: None    Number of children: None    Years of education: None    Highest education level: None   Occupational History    None   Social Needs    Financial resource strain: None    Food insecurity     Worry: None     Inability: None    Transportation needs     Medical: None     Non-medical: None   Tobacco Use    Smoking status: Former Smoker    Smokeless tobacco: Never Used   Substance and Sexual Activity    Alcohol use: Yes     Comment: rare    Drug use: Never    Sexual activity: None   Lifestyle    Physical activity     Days per week: None     Minutes per session: None    Stress: None   Relationships    Social connections     Talks on phone: None     Gets together: None     Attends Sabianist service: None     Active member of club or organization: None     Attends meetings of clubs or organizations: None     Relationship status: None    Intimate partner violence     Fear of current or ex partner: None     Emotionally abused: None     Physically abused: None     Forced sexual activity: None   Other Topics Concern    None   Social History Narrative    Daily coffee consumption    Daily tea consumption     as per all scripts    Lack of exercise    Sleeps 6-7 hours a day        Family History     Family History   Problem Relation Age of Onset    Multiple sclerosis Mother     Colon cancer Maternal Grandmother 79    Rheum arthritis Maternal Grandmother     Arthritis Family     Colon cancer Family     Osteoporosis Family     Anxiety disorder Father     Chromosomal disorder Cousin     No Known Problems Sister     No Known Problems Daughter     No Known Problems Maternal Grandfather     No Known Problems Paternal Grandmother     No Known Problems Paternal Grandfather     No Known Problems Daughter     No Known Problems Son        Current Medications       Current Outpatient Medications:     carBAMazepine (CARBATROL) 300 MG 12 hr capsule, 300 mg every 12 (twelve) hours , Disp: , Rfl: 1    fexofenadine-pseudoephedrine (ALLEGRA-D)  MG per tablet, Take 1 tablet by mouth 2 (two) times a day, Disp: , Rfl:     fluticasone (FLONASE) 50 mcg/act nasal spray, 1 spray into each nostril daily, Disp: , Rfl:     ibuprofen (MOTRIN) 200 mg tablet, Take 200 mg by mouth every 6 (six) hours as needed for mild pain , Disp: , Rfl:     SUMAtriptan (IMITREX) 100 mg tablet, Take 1 tablet (100 mg total) by mouth once as needed for migraine for up to 1 dose, Disp: 15 tablet, Rfl: 0    valACYclovir (VALTREX) 500 mg tablet, 500 mg daily after dinner , Disp: , Rfl: 2    atorvastatin (LIPITOR) 10 mg tablet, Take 1 tablet (10 mg total) by mouth daily, Disp: 90 tablet, Rfl: 0     Allergies     Allergies   Allergen Reactions    Ciprofloxacin Lightheadedness     dizzy    Phenytoin Rash     Reaction Date: 05Oct2010; Objective     /68   Pulse 95   Temp 99 3 °F (37 4 °C)   Resp 16   Ht 5' 1 2" (1 554 m)   Wt 59 kg (130 lb)   LMP 08/19/2020 (Approximate)   SpO2 97%   BMI 24 40 kg/m²      Physical Exam  Constitutional:       General: She is not in acute distress  Appearance: She is well-developed  She is not diaphoretic  HENT:      Head: Normocephalic and atraumatic  Neck:      Musculoskeletal: Normal range of motion and neck supple  Cardiovascular:      Rate and Rhythm: Normal rate and regular rhythm  Heart sounds: Normal heart sounds  No murmur  No friction rub  No gallop  Pulmonary:      Effort: Pulmonary effort is normal  No respiratory distress  Breath sounds: Normal breath sounds  No wheezing or rales     Chest: Chest wall: No tenderness  Abdominal:      General: Bowel sounds are normal  There is no distension  Palpations: Abdomen is soft  There is no mass  Tenderness: There is no abdominal tenderness  There is no guarding or rebound  Musculoskeletal: Normal range of motion  General: No deformity  Skin:     General: Skin is warm and dry  Neurological:      Mental Status: She is alert and oriented to person, place, and time  Psychiatric:         Behavior: Behavior normal          Thought Content:  Thought content normal          Judgment: Judgment normal             Visual Acuity Screening    Right eye Left eye Both eyes   Without correction:      With correction: 20/25 20/25 20/25           MD NITESH [facility]

## 2020-10-20 ENCOUNTER — TRANSCRIBE ORDERS (OUTPATIENT)
Dept: ADMINISTRATIVE | Facility: HOSPITAL | Age: 45
End: 2020-10-20

## 2020-10-20 DIAGNOSIS — Z12.31 ENCOUNTER FOR SCREENING MAMMOGRAM FOR MALIGNANT NEOPLASM OF BREAST: Primary | ICD-10-CM

## 2020-10-22 DIAGNOSIS — G43.109 MIGRAINE WITH AURA AND WITHOUT STATUS MIGRAINOSUS, NOT INTRACTABLE: ICD-10-CM

## 2020-10-22 RX ORDER — SUMATRIPTAN 100 MG/1
100 TABLET, FILM COATED ORAL ONCE AS NEEDED
Qty: 15 TABLET | Refills: 0 | Status: SHIPPED | OUTPATIENT
Start: 2020-10-22 | End: 2020-11-25 | Stop reason: SDUPTHER

## 2020-11-05 ENCOUNTER — TELEMEDICINE (OUTPATIENT)
Dept: FAMILY MEDICINE CLINIC | Facility: CLINIC | Age: 45
End: 2020-11-05
Payer: COMMERCIAL

## 2020-11-05 DIAGNOSIS — Z20.828 EXPOSURE TO SARS-ASSOCIATED CORONAVIRUS: ICD-10-CM

## 2020-11-05 DIAGNOSIS — J01.90 ACUTE SINUSITIS, RECURRENCE NOT SPECIFIED, UNSPECIFIED LOCATION: Primary | ICD-10-CM

## 2020-11-05 PROCEDURE — U0003 INFECTIOUS AGENT DETECTION BY NUCLEIC ACID (DNA OR RNA); SEVERE ACUTE RESPIRATORY SYNDROME CORONAVIRUS 2 (SARS-COV-2) (CORONAVIRUS DISEASE [COVID-19]), AMPLIFIED PROBE TECHNIQUE, MAKING USE OF HIGH THROUGHPUT TECHNOLOGIES AS DESCRIBED BY CMS-2020-01-R: HCPCS | Performed by: FAMILY MEDICINE

## 2020-11-05 PROCEDURE — 99213 OFFICE O/P EST LOW 20 MIN: CPT | Performed by: FAMILY MEDICINE

## 2020-11-05 RX ORDER — AMOXICILLIN 875 MG/1
875 TABLET, COATED ORAL 2 TIMES DAILY
Qty: 14 TABLET | Refills: 0 | Status: SHIPPED | OUTPATIENT
Start: 2020-11-05 | End: 2020-11-12

## 2020-11-07 LAB — SARS-COV-2 RNA SPEC QL NAA+PROBE: NOT DETECTED

## 2020-11-25 DIAGNOSIS — G43.109 MIGRAINE WITH AURA AND WITHOUT STATUS MIGRAINOSUS, NOT INTRACTABLE: ICD-10-CM

## 2020-11-25 RX ORDER — SUMATRIPTAN 100 MG/1
100 TABLET, FILM COATED ORAL ONCE AS NEEDED
Qty: 15 TABLET | Refills: 0 | Status: SHIPPED | OUTPATIENT
Start: 2020-11-25 | End: 2020-12-28 | Stop reason: SDUPTHER

## 2020-12-11 ENCOUNTER — HOSPITAL ENCOUNTER (OUTPATIENT)
Dept: RADIOLOGY | Facility: HOSPITAL | Age: 45
Discharge: HOME | End: 2020-12-11
Attending: FAMILY MEDICINE
Payer: COMMERCIAL

## 2020-12-11 VITALS — WEIGHT: 127 LBS | BODY MASS INDEX: 23.98 KG/M2 | HEIGHT: 61 IN

## 2020-12-11 DIAGNOSIS — Z12.31 ENCOUNTER FOR SCREENING MAMMOGRAM FOR MALIGNANT NEOPLASM OF BREAST: ICD-10-CM

## 2020-12-11 PROCEDURE — 77067 SCR MAMMO BI INCL CAD: CPT

## 2020-12-11 PROCEDURE — 77063 BREAST TOMOSYNTHESIS BI: CPT

## 2020-12-16 ENCOUNTER — TELEMEDICINE (OUTPATIENT)
Dept: FAMILY MEDICINE CLINIC | Facility: CLINIC | Age: 45
End: 2020-12-16
Payer: COMMERCIAL

## 2020-12-16 DIAGNOSIS — J01.90 ACUTE SINUSITIS, RECURRENCE NOT SPECIFIED, UNSPECIFIED LOCATION: Primary | ICD-10-CM

## 2020-12-16 DIAGNOSIS — J02.9 PHARYNGITIS, UNSPECIFIED ETIOLOGY: ICD-10-CM

## 2020-12-16 PROCEDURE — 99213 OFFICE O/P EST LOW 20 MIN: CPT | Performed by: FAMILY MEDICINE

## 2020-12-16 PROCEDURE — 1036F TOBACCO NON-USER: CPT | Performed by: FAMILY MEDICINE

## 2020-12-16 RX ORDER — AMOXICILLIN AND CLAVULANATE POTASSIUM 875; 125 MG/1; MG/1
1 TABLET, FILM COATED ORAL EVERY 12 HOURS SCHEDULED
Qty: 14 TABLET | Refills: 0 | Status: SHIPPED | OUTPATIENT
Start: 2020-12-16 | End: 2020-12-23

## 2020-12-20 DIAGNOSIS — E78.2 HYPERLIPEMIA, MIXED: ICD-10-CM

## 2020-12-21 RX ORDER — ATORVASTATIN CALCIUM 10 MG/1
TABLET, FILM COATED ORAL
Qty: 90 TABLET | Refills: 0 | Status: SHIPPED | OUTPATIENT
Start: 2020-12-21 | End: 2021-03-22

## 2020-12-28 DIAGNOSIS — G43.109 MIGRAINE WITH AURA AND WITHOUT STATUS MIGRAINOSUS, NOT INTRACTABLE: ICD-10-CM

## 2020-12-29 RX ORDER — SUMATRIPTAN 100 MG/1
100 TABLET, FILM COATED ORAL ONCE AS NEEDED
Qty: 15 TABLET | Refills: 0 | Status: SHIPPED | OUTPATIENT
Start: 2020-12-29 | End: 2021-01-30 | Stop reason: SDUPTHER

## 2021-01-14 DIAGNOSIS — Z23 ENCOUNTER FOR IMMUNIZATION: ICD-10-CM

## 2021-01-15 ENCOUNTER — IMMUNIZATIONS (OUTPATIENT)
Dept: FAMILY MEDICINE CLINIC | Facility: HOSPITAL | Age: 46
End: 2021-01-15

## 2021-01-15 ENCOUNTER — TELEPHONE (OUTPATIENT)
Dept: FAMILY MEDICINE CLINIC | Facility: CLINIC | Age: 46
End: 2021-01-15

## 2021-01-15 ENCOUNTER — TELEMEDICINE (OUTPATIENT)
Dept: FAMILY MEDICINE CLINIC | Facility: CLINIC | Age: 46
End: 2021-01-15
Payer: COMMERCIAL

## 2021-01-15 DIAGNOSIS — Z23 ENCOUNTER FOR IMMUNIZATION: Primary | ICD-10-CM

## 2021-01-15 DIAGNOSIS — T78.40XA ALLERGY, INITIAL ENCOUNTER: ICD-10-CM

## 2021-01-15 DIAGNOSIS — E78.2 HYPERLIPEMIA, MIXED: ICD-10-CM

## 2021-01-15 DIAGNOSIS — R07.9 CHEST PAIN, UNSPECIFIED TYPE: Primary | ICD-10-CM

## 2021-01-15 DIAGNOSIS — F41.9 ANXIETY DISORDER, UNSPECIFIED TYPE: ICD-10-CM

## 2021-01-15 PROCEDURE — 0001A SARS-COV-2 / COVID-19 MRNA VACCINE (PFIZER-BIONTECH) 30 MCG: CPT

## 2021-01-15 PROCEDURE — 91300 SARS-COV-2 / COVID-19 MRNA VACCINE (PFIZER-BIONTECH) 30 MCG: CPT

## 2021-01-15 PROCEDURE — 99213 OFFICE O/P EST LOW 20 MIN: CPT | Performed by: FAMILY MEDICINE

## 2021-01-15 NOTE — TELEPHONE ENCOUNTER
Pt has been seen for the last two months for recurrent sinus infections with DR Ryanne Weiss should she be  Seeing her in follow up for this again today?

## 2021-01-15 NOTE — PROGRESS NOTES
Virtual Regular Visit      Assessment/Plan:    Problem List Items Addressed This Visit        Other    Anxiety disorder    Relevant Orders    RF Screen w/ Reflex to Titer    Cyclic citrul peptide antibody, IgG    CBC    Comprehensive metabolic panel    TSH, 3rd generation    [facility] Allergy Panel, Adult    Hyperlipemia, mixed    Relevant Orders    RF Screen w/ Reflex to Titer    Cyclic citrul peptide antibody, IgG    CBC    Comprehensive metabolic panel    TSH, 3rd generation    [facility] Allergy Panel, Adult      Other Visit Diagnoses     Chest pain, unspecified type    -  Primary    Relevant Orders    Stress test only, exercise    Echo complete with contrast if indicated    RF Screen w/ Reflex to Titer    Cyclic citrul peptide antibody, IgG    CBC    Comprehensive metabolic panel    TSH, 3rd generation    [facility] Allergy Panel, Adult    Allergy, initial encounter        Relevant Orders    [facility] Allergy Panel, Adult        Cannot make a comment on the bumps in her neck without seeing her - pt advised to make a live appt  Ordered labs and imaging on pt athough it seems like she is dealing with musculoskeletal pain         Reason for visit is   Chief Complaint   Patient presents with    Virtual Regular Visit        Encounter provider Axel Melo DO    Provider located at [address] [address]      Recent Visits  No visits were found meeting these conditions  Showing recent visits within past 7 days and meeting all other requirements     Today's Visits  Date Type Provider Dept   01/15/21 Telephone Ermias Soto 64   01/15/21 [address] [facility] today's visits and meeting all other requirements     Future Appointments  No visits were found meeting these conditions     Showing future appointments within next 150 days and meeting all other requirements        The patient was identified by name and date of birth  Camryn Huffman was informed that this is a telemedicine visit and that the visit is being conducted through [employer] and patient was informed that this is not a secure, HIPAA-compliant platform  She agrees to proceed     My office door was closed  No one else was in the room  She acknowledged consent and understanding of privacy and security of the video platform  The patient has agreed to participate and understands they can discontinue the visit at any time  Patient is aware this is a billable service  Miguelina Hui is a 39 y o  female    Pt is being seen for a virtual appt  Pt has been seen previously by different provider  Pt states she has ongoing sinus issues as well as bumps in her throat  Pt states she was given seven days of abx -   Pt states she ended up going to urgent care as well - while she was taking abx  Urgent care wanted to extend the abx, pt states she also had some tightness in her chest  Pt was placed on a steroid as well  With the steroid pt also started with difficulty breathing and pain in her chest   Pt was also advised they did not think she had COVID  Once she stopped the steroid breathing issues improved  Pt states she is still getting pain in her chest  Pt states she also is getting pain around her coller bone   Pain in her chest is not all the time states it comes and it goes, not doing anthing mopst of the time  She does not think it is anxiety  Opt also concerned due to her high colesterol    Pt states she had a COViD test - and the results are negative         Past Medical History:   Diagnosis Date    Cervical radiculopathy     RESOLVED: [id number] buldging disc    Kidney stone     Se[facility])     age 25       Past Surgical History:   Procedure Laterality Date    ADENOIDECTOMY      COLONOSCOPY      LITHOTRIPSY  2006    RENAL    OVARIAN CYST REMOVAL  2012    [place] HYSTEROSCOPY,W/ENDO BX N/A 10/28/2019 Procedure: OPERATIVE HYSTEROSCOPY (MYOSURE),POLYPECTOMY,D AND C;  Surgeon: Shawanda Mac MD;  [facility];  Service: Gynecology       Current Outpatient Medications   Medication Sig Dispense Refill    atorvastatin (LIPITOR) 10 mg tablet TAKE 1 TABLET(10 MG) BY MOUTH DAILY 90 tablet 0    carBAMazepine (CARBATROL) 300 MG 12 hr capsule 300 mg every 12 (twelve) hours   1    fexofenadine-pseudoephedrine (ALLEGRA-D)  MG per tablet Take 1 tablet by mouth 2 (two) times a day      fluticasone (FLONASE) 50 mcg/act nasal spray 1 spray into each nostril daily      ibuprofen (MOTRIN) 200 mg tablet Take 200 mg by mouth every 6 (six) hours as needed for mild pain       SUMAtriptan (IMITREX) 100 mg tablet Take 1 tablet (100 mg total) by mouth once as needed for migraine for up to 1 dose 15 tablet 0    valACYclovir (VALTREX) 500 mg tablet 500 mg daily after dinner   2     No current facility-administered medications for this visit  Allergies   Allergen Reactions    Ciprofloxacin Lightheadedness     dizzy    Phenytoin Rash     Reaction Date: [id number];        Review of Systems   Constitutional: Negative  Negative for activity change, appetite change, chills, diaphoresis and fatigue  HENT: Positive for congestion  Negative for dental problem, ear pain, sinus pressure and sore throat  Eyes: Negative  Negative for photophobia, pain, discharge, redness, itching and visual disturbance  Respiratory: Negative for apnea and chest tightness  Cardiovascular: Positive for chest pain  Negative for palpitations and leg swelling  Gastrointestinal: Negative  Negative for abdominal distention, abdominal pain, constipation and diarrhea  Endocrine: Negative  Negative for cold intolerance and heat intolerance  Genitourinary: Negative  Negative for difficulty urinating and dyspareunia  Musculoskeletal: Positive for arthralgias and myalgias  Negative for back pain  Skin: Negative  Allergic/Immunologic: Negative for environmental allergies  Neurological: Negative  Negative for dizziness  Psychiatric/Behavioral: Negative  Negative for agitation  Video Exam    There were no vitals filed for this visit  Physical Exam  Vitals signs reviewed  Constitutional:       General: She is not in acute distress  Appearance: She is well-developed  She is not diaphoretic  HENT:      Head: Normocephalic and atraumatic  Eyes:      General:         Right eye: No discharge  Left eye: No discharge  Conjunctiva/sclera: Conjunctivae normal    Neck:      Musculoskeletal: Normal range of motion  Pulmonary:      Effort: Pulmonary effort is normal  No respiratory distress  I spent 10 minutes directly with the patient during this visit      Kylee Salazar  Horace Muñoz 1 acknowledges that she has consented to an online visit or consultation  She understands that the online visit is based solely on information provided by her, and that, in the absence of a face-to-face physical evaluation by the physician, the diagnosis she receives is both limited and provisional in terms of accuracy and completeness  This is not intended to replace a full medical face-to-face evaluation by the physician  Lamberto Feliciano understands and accepts these terms

## 2021-01-18 ENCOUNTER — OFFICE VISIT (OUTPATIENT)
Dept: FAMILY MEDICINE CLINIC | Facility: CLINIC | Age: 46
End: 2021-01-18
Payer: COMMERCIAL

## 2021-01-18 VITALS
HEIGHT: 61 IN | TEMPERATURE: 98.5 F | BODY MASS INDEX: 25.49 KG/M2 | SYSTOLIC BLOOD PRESSURE: 108 MMHG | WEIGHT: 135 LBS | HEART RATE: 84 BPM | RESPIRATION RATE: 16 BRPM | DIASTOLIC BLOOD PRESSURE: 62 MMHG

## 2021-01-18 DIAGNOSIS — R59.1 LYMPHADENOPATHY OF HEAD AND NECK: Primary | ICD-10-CM

## 2021-01-18 PROCEDURE — 3008F BODY MASS INDEX DOCD: CPT | Performed by: FAMILY MEDICINE

## 2021-01-18 PROCEDURE — 1036F TOBACCO NON-USER: CPT | Performed by: FAMILY MEDICINE

## 2021-01-18 PROCEDURE — 99213 OFFICE O/P EST LOW 20 MIN: CPT | Performed by: FAMILY MEDICINE

## 2021-01-18 NOTE — PROGRESS NOTES
Assessment/Plan:    1  Lymphadenopathy of head and neck  -     US head neck soft tissue; Future; Expected date: 01/18/2021    The nodes may be reactive from prev infection in throat  Will follow Us of neck and labs already ordered        There are no Patient Instructions on file for this visit  Return for Next scheduled follow up  Subjective:      Patient ID: Home Smith is a 39 y o  female  Chief Complaint   Patient presents with    Neck Pain     lymph nodes  x a month  [facility]       Pt being seen for a live appt  Pt had labs on satuirday  Pt had sched her echo and stress test  Pt states she gets what feels like swollen glands, under [place] in ant neck aswell  Few years ago we felt something and she went for an US  The lesions in the neck started aboput a month ago, about the same time as the bums tn her throat      The following portions of the patient's history were reviewed and updated as appropriate: allergies, current medications, past family history, past medical history, past social history, past surgical history and problem list     Review of Systems   Constitutional: Negative  Negative for activity change, appetite change, chills, diaphoresis and fatigue  HENT: Negative  Negative for dental problem, ear pain, sinus pressure and sore throat  Eyes: Negative  Negative for photophobia, pain, discharge, redness, itching and visual disturbance  Respiratory: Negative for apnea and chest tightness  Cardiovascular: Negative  Negative for chest pain, palpitations and leg swelling  Gastrointestinal: Negative  Negative for abdominal distention, abdominal pain, constipation and diarrhea  Endocrine: Negative  Negative for cold intolerance and heat intolerance  Genitourinary: Negative  Negative for difficulty urinating and dyspareunia  Musculoskeletal: Negative  Negative for arthralgias and back pain  Skin: Negative           bumps in neck   Allergic/Immunologic: Negative for environmental allergies  Neurological: Negative  Negative for dizziness  Psychiatric/Behavioral: Negative  Negative for agitation  Current Outpatient Medications   Medication Sig Dispense Refill    atorvastatin (LIPITOR) 10 mg tablet TAKE 1 TABLET(10 MG) BY MOUTH DAILY 90 tablet 0    carBAMazepine (CARBATROL) 300 MG 12 hr capsule 300 mg every 12 (twelve) hours   1    ibuprofen (MOTRIN) 200 mg tablet Take 200 mg by mouth every 6 (six) hours as needed for mild pain       SUMAtriptan (IMITREX) 100 mg tablet Take 1 tablet (100 mg total) by mouth once as needed for migraine for up to 1 dose 15 tablet 0    valACYclovir (VALTREX) 500 mg tablet 500 mg daily after dinner   2    fexofenadine-pseudoephedrine (ALLEGRA-D)  MG per tablet Take 1 tablet by mouth 2 (two) times a day      fluticasone (FLONASE) 50 mcg/act nasal spray 1 spray into each nostril daily       No current facility-administered medications for this visit  Objective:    /62   Pulse 84   Temp 98 5 °F (36 9 °C)   Resp 16   Ht 5' 1" (1 549 m)   Wt 61 2 kg (135 lb)   BMI 25 51 kg/m²        Physical Exam  Vitals signs and nursing note reviewed  Constitutional:       General: She is not in acute distress  Appearance: She is well-developed  She is not diaphoretic  Comments: Bumps in rt submandibular area ,  Bumps in left ant neck   HENT:      Head: Normocephalic and atraumatic  Right Ear: External ear normal       Left Ear: External ear normal       Nose: Nose normal       Mouth/Throat:      Pharynx: No oropharyngeal exudate  Eyes:      General: No scleral icterus  Right eye: No discharge  Left eye: No discharge  Pupils: Pupils are equal, round, and reactive to light  Neck:      Thyroid: No thyromegaly  Cardiovascular:      Rate and Rhythm: Normal rate  Heart sounds: Normal heart sounds  No murmur  Pulmonary:      Effort: Pulmonary effort is normal  No respiratory distress  Breath sounds: Normal breath sounds  No wheezing  Abdominal:      General: Bowel sounds are normal  There is no distension  Palpations: Abdomen is soft  There is no mass  Tenderness: There is no abdominal tenderness  There is no guarding or rebound  Musculoskeletal: Normal range of motion  Skin:     General: Skin is warm and dry  Findings: No erythema or rash  Neurological:      Mental Status: She is alert        Coordination: Coordination normal       Deep Tendon Reflexes: Reflexes normal    Psychiatric:         Behavior: Behavior normal                 Virgene Fat, DO

## 2021-01-19 LAB
A ALTERNATA IGE QN: <0.1 KU/L
A FUMIGATUS IGE QN: <0.1 KU/L
ALBUMIN SERPL-MCNC: 4 G/DL (ref 3.8–4.8)
ALBUMIN/GLOB SERPL: 2.2 {RATIO} (ref 1.2–2.2)
ALP SERPL-CCNC: 84 IU/L (ref 39–117)
ALT SERPL-CCNC: 18 IU/L (ref 0–32)
AST SERPL-CCNC: 20 IU/L (ref 0–40)
BASOPHILS # BLD AUTO: 0.1 X10E3/UL (ref 0–0.2)
BASOPHILS NFR BLD AUTO: 1 %
BERMUDA GRASS IGE QN: <0.1 KU/L
BILIRUB SERPL-MCNC: 0.3 MG/DL (ref 0–1.2)
BOXELDER IGE QN: <0.1 KU/L
BUN SERPL-MCNC: 17 MG/DL (ref 6–24)
BUN/CREAT SERPL: 25 (ref 9–23)
C HERBARUM IGE QN: <0.1 KU/L
CALCIUM SERPL-MCNC: 8.6 MG/DL (ref 8.7–10.2)
CAT DANDER IGE QN: <0.1 KU/L
CCP IGA+IGG SERPL IA-ACNC: 4 UNITS (ref 0–19)
CHLORIDE SERPL-SCNC: 103 MMOL/L (ref 96–106)
CMN PIGWEED IGE QN: <0.1 KU/L
CO2 SERPL-SCNC: 22 MMOL/L (ref 20–29)
COMMON RAGWEED IGE QN: <0.1 KU/L
COTTONWOOD IGE QN: <0.1 KU/L
CREAT SERPL-MCNC: 0.69 MG/DL (ref 0.57–1)
D FARINAE IGE QN: <0.1 KU/L
D PTERONYSS IGE QN: <0.1 KU/L
DOG DANDER IGE QN: <0.1 KU/L
EOSINOPHIL # BLD AUTO: 0.1 X10E3/UL (ref 0–0.4)
EOSINOPHIL NFR BLD AUTO: 1 %
ERYTHROCYTE [DISTWIDTH] IN BLOOD BY AUTOMATED COUNT: 12 % (ref 11.7–15.4)
GLOBULIN SER-MCNC: 1.8 G/DL (ref 1.5–4.5)
GLUCOSE SERPL-MCNC: 87 MG/DL (ref 65–99)
HCT VFR BLD AUTO: 39.5 % (ref 34–46.6)
HGB BLD-MCNC: 13.2 G/DL (ref 11.1–15.9)
IGE SERPL-ACNC: 15 IU/ML (ref 6–495)
IMM GRANULOCYTES # BLD: 0 X10E3/UL (ref 0–0.1)
IMM GRANULOCYTES NFR BLD: 0 %
LONDON PLANE IGE QN: <0.1 KU/L
LYMPHOCYTES # BLD AUTO: 2.7 X10E3/UL (ref 0.7–3.1)
LYMPHOCYTES NFR BLD AUTO: 39 %
Lab: NORMAL
MCH RBC QN AUTO: 31.4 PG (ref 26.6–33)
MCHC RBC AUTO-ENTMCNC: 33.4 G/DL (ref 31.5–35.7)
MCV RBC AUTO: 94 FL (ref 79–97)
MONOCYTES # BLD AUTO: 0.6 X10E3/UL (ref 0.1–0.9)
MONOCYTES NFR BLD AUTO: 9 %
MOUSE URINE PROT IGE QN: <0.1 KU/L
MT JUNIPER IGE QN: <0.1 KU/L
MUGWORT IGE QN: <0.1 KU/L
NEUTROPHILS # BLD AUTO: 3.5 X10E3/UL (ref 1.4–7)
NEUTROPHILS NFR BLD AUTO: 50 %
PENICILLIUM CHRYSOGENUM: <0.1 KU/L
PLATELET # BLD AUTO: 234 X10E3/UL (ref 150–450)
POTASSIUM SERPL-SCNC: 4.2 MMOL/L (ref 3.5–5.2)
PROT SERPL-MCNC: 5.8 G/DL (ref 6–8.5)
RBC # BLD AUTO: 4.2 X10E6/UL (ref 3.77–5.28)
RHEUMATOID FACT SERPL-ACNC: <10 IU/ML (ref 0–13.9)
ROACH IGE QN: <0.1 KU/L
SHEEP SORREL IGE QN: <0.1 KU/L
SILVER BIRCH IGE QN: <0.1 KU/L
SL AMB EGFR AFRICAN AMERICAN: 122 ML/MIN/1.73
SL AMB EGFR NON AFRICAN AMERICAN: 105 ML/MIN/1.73
SODIUM SERPL-SCNC: 140 MMOL/L (ref 134–144)
TIMOTHY IGE QN: <0.1 KU/L
TSH SERPL DL<=0.005 MIU/L-ACNC: 1.7 UIU/ML (ref 0.45–4.5)
WALNUT IGE: <0.1 KU/L
WBC # BLD AUTO: 7 X10E3/UL (ref 3.4–10.8)
WHITE ASH IGE QN: <0.1 KU/L
WHITE ELM IGE QN: <0.1 KU/L
WHITE MULBERRY IGE QN: <0.1 KU/L
WHITE OAK IGE QN: <0.1 KU/L

## 2021-01-22 ENCOUNTER — HOSPITAL ENCOUNTER (OUTPATIENT)
Dept: NON INVASIVE DIAGNOSTICS | Facility: CLINIC | Age: 46
Discharge: HOME | End: 2021-01-22
Payer: COMMERCIAL

## 2021-01-22 DIAGNOSIS — R07.9 CHEST PAIN, UNSPECIFIED TYPE: ICD-10-CM

## 2021-01-22 PROCEDURE — 93306 TTE W/DOPPLER COMPLETE: CPT | Performed by: INTERNAL MEDICINE

## 2021-01-22 PROCEDURE — 93306 TTE W/DOPPLER COMPLETE: CPT

## 2021-01-25 ENCOUNTER — HOSPITAL ENCOUNTER (OUTPATIENT)
Dept: NON INVASIVE DIAGNOSTICS | Facility: CLINIC | Age: 46
Discharge: HOME | End: 2021-01-25
Payer: COMMERCIAL

## 2021-01-25 DIAGNOSIS — R07.9 CHEST PAIN, UNSPECIFIED TYPE: ICD-10-CM

## 2021-01-25 PROCEDURE — 93018 CV STRESS TEST I&R ONLY: CPT | Performed by: INTERNAL MEDICINE

## 2021-01-25 PROCEDURE — 93017 CV STRESS TEST TRACING ONLY: CPT

## 2021-01-25 PROCEDURE — 93016 CV STRESS TEST SUPVJ ONLY: CPT | Performed by: INTERNAL MEDICINE

## 2021-01-26 LAB
CHEST PAIN STATEMENT: NORMAL
MAX DIASTOLIC BP: 82 MMHG
MAX HEART RATE: 166 BPM
MAX PREDICTED HEART RATE: 175 BPM
MAX. SYSTOLIC BP: 122 MMHG
PROTOCOL NAME: NORMAL
REASON FOR TERMINATION: NORMAL
TARGET HR FORMULA: NORMAL
TEST INDICATION: NORMAL
TIME IN EXERCISE PHASE: NORMAL

## 2021-01-30 DIAGNOSIS — G43.109 MIGRAINE WITH AURA AND WITHOUT STATUS MIGRAINOSUS, NOT INTRACTABLE: ICD-10-CM

## 2021-02-01 RX ORDER — SUMATRIPTAN 100 MG/1
100 TABLET, FILM COATED ORAL ONCE AS NEEDED
Qty: 15 TABLET | Refills: 0 | Status: SHIPPED | OUTPATIENT
Start: 2021-02-01 | End: 2021-03-01 | Stop reason: SDUPTHER

## 2021-02-03 ENCOUNTER — IMMUNIZATIONS (OUTPATIENT)
Dept: FAMILY MEDICINE CLINIC | Facility: HOSPITAL | Age: 46
End: 2021-02-03

## 2021-02-03 DIAGNOSIS — Z23 ENCOUNTER FOR IMMUNIZATION: Primary | ICD-10-CM

## 2021-02-03 PROCEDURE — 91300 SARS-COV-2 / COVID-19 MRNA VACCINE (PFIZER-BIONTECH) 30 MCG: CPT

## 2021-02-03 PROCEDURE — 0002A SARS-COV-2 / COVID-19 MRNA VACCINE (PFIZER-BIONTECH) 30 MCG: CPT

## 2021-03-01 DIAGNOSIS — G43.109 MIGRAINE WITH AURA AND WITHOUT STATUS MIGRAINOSUS, NOT INTRACTABLE: ICD-10-CM

## 2021-03-01 RX ORDER — SUMATRIPTAN 100 MG/1
100 TABLET, FILM COATED ORAL ONCE AS NEEDED
Qty: 15 TABLET | Refills: 0 | Status: SHIPPED | OUTPATIENT
Start: 2021-03-01 | End: 2021-03-30 | Stop reason: SDUPTHER

## 2021-03-21 DIAGNOSIS — E78.2 HYPERLIPEMIA, MIXED: ICD-10-CM

## 2021-03-22 RX ORDER — ATORVASTATIN CALCIUM 10 MG/1
TABLET, FILM COATED ORAL
Qty: 90 TABLET | Refills: 0 | Status: SHIPPED | OUTPATIENT
Start: 2021-03-22 | End: 2021-07-16 | Stop reason: SDUPTHER

## 2021-03-23 ENCOUNTER — HOSPITAL ENCOUNTER (OUTPATIENT)
Dept: RADIOLOGY | Facility: HOSPITAL | Age: 46
Discharge: HOME | End: 2021-03-23
Attending: FAMILY MEDICINE
Payer: COMMERCIAL

## 2021-03-23 DIAGNOSIS — R59.1 LYMPHADENOPATHY OF HEAD AND NECK: ICD-10-CM

## 2021-03-23 PROCEDURE — 76536 US EXAM OF HEAD AND NECK: CPT

## 2021-03-30 DIAGNOSIS — G43.109 MIGRAINE WITH AURA AND WITHOUT STATUS MIGRAINOSUS, NOT INTRACTABLE: ICD-10-CM

## 2021-03-30 RX ORDER — SUMATRIPTAN 100 MG/1
100 TABLET, FILM COATED ORAL ONCE AS NEEDED
Qty: 15 TABLET | Refills: 0 | Status: SHIPPED | OUTPATIENT
Start: 2021-03-30 | End: 2021-05-04 | Stop reason: SDUPTHER

## 2021-05-04 DIAGNOSIS — G43.109 MIGRAINE WITH AURA AND WITHOUT STATUS MIGRAINOSUS, NOT INTRACTABLE: ICD-10-CM

## 2021-05-04 RX ORDER — SUMATRIPTAN 100 MG/1
100 TABLET, FILM COATED ORAL ONCE AS NEEDED
Qty: 15 TABLET | Refills: 0 | Status: SHIPPED | OUTPATIENT
Start: 2021-05-04 | End: 2021-05-06 | Stop reason: SDUPTHER

## 2021-05-06 ENCOUNTER — OFFICE VISIT (OUTPATIENT)
Dept: FAMILY MEDICINE CLINIC | Facility: CLINIC | Age: 46
End: 2021-05-06
Payer: COMMERCIAL

## 2021-05-06 VITALS
WEIGHT: 132 LBS | OXYGEN SATURATION: 98 % | BODY MASS INDEX: 24.92 KG/M2 | SYSTOLIC BLOOD PRESSURE: 122 MMHG | HEIGHT: 61 IN | DIASTOLIC BLOOD PRESSURE: 70 MMHG | RESPIRATION RATE: 18 BRPM | HEART RATE: 76 BPM | TEMPERATURE: 97.4 F

## 2021-05-06 DIAGNOSIS — G43.109 MIGRAINE WITH AURA AND WITHOUT STATUS MIGRAINOSUS, NOT INTRACTABLE: ICD-10-CM

## 2021-05-06 DIAGNOSIS — Z13.6 SCREENING FOR CARDIOVASCULAR CONDITION: ICD-10-CM

## 2021-05-06 DIAGNOSIS — E78.2 HYPERLIPEMIA, MIXED: Primary | ICD-10-CM

## 2021-05-06 PROCEDURE — 3008F BODY MASS INDEX DOCD: CPT | Performed by: FAMILY MEDICINE

## 2021-05-06 PROCEDURE — 3725F SCREEN DEPRESSION PERFORMED: CPT | Performed by: FAMILY MEDICINE

## 2021-05-06 PROCEDURE — 99213 OFFICE O/P EST LOW 20 MIN: CPT | Performed by: FAMILY MEDICINE

## 2021-05-06 PROCEDURE — 1036F TOBACCO NON-USER: CPT | Performed by: FAMILY MEDICINE

## 2021-05-06 RX ORDER — SUMATRIPTAN 100 MG/1
100 TABLET, FILM COATED ORAL ONCE AS NEEDED
Qty: 15 TABLET | Refills: 3 | Status: SHIPPED | OUTPATIENT
Start: 2021-05-06 | End: 2021-09-30 | Stop reason: SDUPTHER

## 2021-05-06 NOTE — PROGRESS NOTES
Assessment/Plan:    1  Hyperlipemia, mixed  -     Comprehensive metabolic panel; Future  -     Lipid Panel with Direct LDL reflex; Future    2  Migraine with aura and without status migrainosus, not intractable  -     SUMAtriptan (IMITREX) 100 mg tablet; Take 1 tablet (100 mg total) by mouth once as needed for migraine for up to 1 dose    3  Screening for cardiovascular condition  -     CT coronary calcium score; Future; Expected date: 05/06/2021            There are no Patient Instructions on file for this visit  No follow-ups on file  Subjective:      Patient ID: Long Hirsch is a 39 y o  female  Chief Complaint   Patient presents with    Follow-up     medication review  [facility]       Pt states she was acalled in for her refill for imitrex  Will need to get her labs  Jo Last are still present - imitrex controls them when she has     Pt is asking about possible screening for cardiac disease      The following portions of the patient's history were reviewed and updated as appropriate: allergies, current medications, past family history, past medical history, past social history, past surgical history and problem list     Review of Systems   Constitutional: Negative  Negative for activity change, appetite change, chills, diaphoresis and fatigue  HENT: Negative  Negative for dental problem, ear pain, sinus pressure and sore throat  Eyes: Negative  Negative for photophobia, pain, discharge, redness, itching and visual disturbance  Respiratory: Negative for apnea and chest tightness  Cardiovascular: Negative  Negative for chest pain, palpitations and leg swelling  Gastrointestinal: Negative  Negative for abdominal distention, abdominal pain, constipation and diarrhea  Endocrine: Negative  Negative for cold intolerance and heat intolerance  Genitourinary: Negative  Negative for difficulty urinating and dyspareunia  Musculoskeletal: Negative  Negative for arthralgias and back pain     Skin: Negative  Allergic/Immunologic: Negative for environmental allergies  Neurological: Negative  Negative for dizziness  Psychiatric/Behavioral: Negative  Negative for agitation  Current Outpatient Medications   Medication Sig Dispense Refill    atorvastatin (LIPITOR) 10 mg tablet TAKE 1 TABLET(10 MG) BY MOUTH DAILY 90 tablet 0    carBAMazepine (CARBATROL) 300 MG 12 hr capsule 300 mg every 12 (twelve) hours   1    fexofenadine-pseudoephedrine (ALLEGRA-D)  MG per tablet Take 1 tablet by mouth 2 (two) times a day      fluticasone (FLONASE) 50 mcg/act nasal spray 1 spray into each nostril daily      ibuprofen (MOTRIN) 200 mg tablet Take 200 mg by mouth every 6 (six) hours as needed for mild pain       SUMAtriptan (IMITREX) 100 mg tablet Take 1 tablet (100 mg total) by mouth once as needed for migraine for up to 1 dose 15 tablet 3    valACYclovir (VALTREX) 500 mg tablet 500 mg daily after dinner   2     No current facility-administered medications for this visit  Objective:    /70   Pulse 76   Temp (!) 97 4 °F (36 3 °C)   Resp 18   Ht 5' 1" (1 549 m)   Wt 59 9 kg (132 lb)   LMP 04/19/2021 (Approximate)   SpO2 98%   BMI 24 94 kg/m²        Physical Exam  Vitals signs and nursing note reviewed  Constitutional:       General: She is not in acute distress  Appearance: She is well-developed  She is not diaphoretic  HENT:      Head: Normocephalic and atraumatic  Right Ear: External ear normal       Left Ear: External ear normal       Nose: Nose normal       Mouth/Throat:      Pharynx: No oropharyngeal exudate  Eyes:      General: No scleral icterus  Right eye: No discharge  Left eye: No discharge  Pupils: Pupils are equal, round, and reactive to light  Neck:      Thyroid: No thyromegaly  Cardiovascular:      Rate and Rhythm: Normal rate  Heart sounds: Normal heart sounds  No murmur     Pulmonary:      Effort: Pulmonary effort is normal  No respiratory distress  Breath sounds: Normal breath sounds  No wheezing  Abdominal:      General: Bowel sounds are normal  There is no distension  Palpations: Abdomen is soft  There is no mass  Tenderness: There is no abdominal tenderness  There is no guarding or rebound  Musculoskeletal: Normal range of motion  Skin:     General: Skin is warm and dry  Findings: No erythema or rash  Neurological:      Mental Status: She is alert        Coordination: Coordination normal       Deep Tendon Reflexes: Reflexes normal    Psychiatric:         Behavior: Behavior normal                 Radha Yen DO

## 2021-05-26 ENCOUNTER — TELEPHONE (OUTPATIENT)
Dept: FAMILY MEDICINE CLINIC | Facility: CLINIC | Age: 46
End: 2021-05-26

## 2021-05-26 NOTE — TELEPHONE ENCOUNTER
Adriana Scanlon calling stating her dog has round worm  Vet told her humans cannot get it unless they eat feces  She wanted a second opinion  Please ask doctor and call patient back

## 2021-05-28 ENCOUNTER — HOSPITAL ENCOUNTER (OUTPATIENT)
Dept: CT IMAGING | Facility: HOSPITAL | Age: 46
Discharge: HOME | End: 2021-05-28
Payer: COMMERCIAL

## 2021-05-28 DIAGNOSIS — Z13.6 SCREENING FOR CARDIOVASCULAR CONDITION: ICD-10-CM

## 2021-05-28 PROCEDURE — 75571 CT HRT W/O DYE W/CA TEST: CPT

## 2021-05-28 PROCEDURE — G1004 CDSM NDSC: HCPCS

## 2021-07-16 ENCOUNTER — OFFICE VISIT (OUTPATIENT)
Dept: FAMILY MEDICINE CLINIC | Facility: CLINIC | Age: 46
End: 2021-07-16
Payer: COMMERCIAL

## 2021-07-16 VITALS
BODY MASS INDEX: 24.17 KG/M2 | SYSTOLIC BLOOD PRESSURE: 116 MMHG | HEIGHT: 61 IN | WEIGHT: 128 LBS | TEMPERATURE: 99.4 F | OXYGEN SATURATION: 96 % | DIASTOLIC BLOOD PRESSURE: 74 MMHG | HEART RATE: 96 BPM | RESPIRATION RATE: 16 BRPM

## 2021-07-16 DIAGNOSIS — E78.2 HYPERLIPEMIA, MIXED: ICD-10-CM

## 2021-07-16 DIAGNOSIS — S61.411A LACERATION OF RIGHT HAND WITHOUT FOREIGN BODY, INITIAL ENCOUNTER: Primary | ICD-10-CM

## 2021-07-16 LAB
ALBUMIN SERPL-MCNC: 4.4 G/DL (ref 3.8–4.8)
ALBUMIN/GLOB SERPL: 2.3 {RATIO} (ref 1.2–2.2)
ALP SERPL-CCNC: 77 IU/L (ref 48–121)
ALT SERPL-CCNC: 21 IU/L (ref 0–32)
AST SERPL-CCNC: 23 IU/L (ref 0–40)
BILIRUB SERPL-MCNC: 0.2 MG/DL (ref 0–1.2)
BUN SERPL-MCNC: 18 MG/DL (ref 6–24)
BUN/CREAT SERPL: 23 (ref 9–23)
CALCIUM SERPL-MCNC: 9 MG/DL (ref 8.7–10.2)
CHLORIDE SERPL-SCNC: 104 MMOL/L (ref 96–106)
CHOLEST SERPL-MCNC: 206 MG/DL (ref 100–199)
CO2 SERPL-SCNC: 22 MMOL/L (ref 20–29)
CREAT SERPL-MCNC: 0.79 MG/DL (ref 0.57–1)
GLOBULIN SER-MCNC: 1.9 G/DL (ref 1.5–4.5)
GLUCOSE SERPL-MCNC: 81 MG/DL (ref 65–99)
HDLC SERPL-MCNC: 86 MG/DL
LDLC SERPL CALC-MCNC: 110 MG/DL (ref 0–99)
MICRODELETION SYND BLD/T FISH: NORMAL
POTASSIUM SERPL-SCNC: 4.9 MMOL/L (ref 3.5–5.2)
PROT SERPL-MCNC: 6.3 G/DL (ref 6–8.5)
SL AMB EGFR AFRICAN AMERICAN: 105 ML/MIN/1.73
SL AMB EGFR NON AFRICAN AMERICAN: 91 ML/MIN/1.73
SODIUM SERPL-SCNC: 139 MMOL/L (ref 134–144)
TRIGL SERPL-MCNC: 56 MG/DL (ref 0–149)

## 2021-07-16 PROCEDURE — 99213 OFFICE O/P EST LOW 20 MIN: CPT | Performed by: NURSE PRACTITIONER

## 2021-07-16 PROCEDURE — 3008F BODY MASS INDEX DOCD: CPT | Performed by: NURSE PRACTITIONER

## 2021-07-16 PROCEDURE — 90471 IMMUNIZATION ADMIN: CPT

## 2021-07-16 PROCEDURE — 1036F TOBACCO NON-USER: CPT | Performed by: NURSE PRACTITIONER

## 2021-07-16 PROCEDURE — 90715 TDAP VACCINE 7 YRS/> IM: CPT

## 2021-07-16 RX ORDER — LIDOCAINE AND PRILOCAINE 25; 25 MG/G; MG/G
CREAM TOPICAL
COMMUNITY
End: 2022-01-14

## 2021-07-16 RX ORDER — ATORVASTATIN CALCIUM 10 MG/1
10 TABLET, FILM COATED ORAL DAILY
Qty: 30 TABLET | Refills: 0 | Status: SHIPPED | OUTPATIENT
Start: 2021-07-16 | End: 2021-08-21 | Stop reason: SDUPTHER

## 2021-07-16 NOTE — PROGRESS NOTES
Assessment/Plan:    1  Laceration of right hand without foreign body, initial encounter  -     TDAP VACCINE GREATER THAN OR EQUAL TO 8YO IM    2  Hyperlipemia, mixed  Comments:  counseled on diet/exercise modifications  Orders:  -     atorvastatin (LIPITOR) 10 mg tablet; Take 1 tablet (10 mg total) by mouth daily          BMI Counseling: Body mass index is 24 19 kg/m²  Discussed the patient's BMI with her  Patient Instructions:  Supportive care discussed and advised  Advised to RTO for any worsening and no improvement  Follow up for no improvement and worsening of conditions  Patient advised and educated when to see immediate medical care  Return if symptoms worsen or fail to improve  Future Appointments   Date Time Provider Ramiro Rico   9/20/2021 12:30 PM BE [facility] EEG ROOM 02 [facility]           Subjective:      Patient ID: Allison Garrido is a 39 y o  female  Chief Complaint   Patient presents with    Alex Nail Cut     mz cma         Vitals:  /74   Pulse 96   Temp 99 4 °F (37 4 °C)   Resp 16   Ht 5' 1" (1 549 m)   Wt 58 1 kg (128 lb)   LMP 07/01/2021   SpO2 96%   BMI 24 19 kg/m²     HPI  Patient stated that yesterday in her garage, scratched her right hand with alex nail and had some bleeding which stopped  Not uptodate on her tetanus vaccine and will need that today  Area looks fine without any issues  Needs short term supply for lipitor as going away on vacation and today had blood work done and waiting for results  The following portions of the patient's history were reviewed and updated as appropriate: allergies, current medications, past family history, past medical history, past social history, past surgical history and problem list       Review of Systems   Constitutional: Negative  HENT: Negative  Respiratory: Negative  Cardiovascular: Negative  Gastrointestinal: Negative  Genitourinary: Negative      Skin:        As noted in HPI Neurological: Negative for dizziness and headaches  Psychiatric/Behavioral: Negative  Objective:    Social History     Tobacco Use   Smoking Status Former Smoker    Quit date: 2007    Years since quittin 2   Smokeless Tobacco Never Used       Allergies: Allergies   Allergen Reactions    Ciprofloxacin Lightheadedness     dizzy    Phenytoin Rash     Reaction Date: 2010;          Current Outpatient Medications   Medication Sig Dispense Refill    atorvastatin (LIPITOR) 10 mg tablet Take 1 tablet (10 mg total) by mouth daily 30 tablet 0    carBAMazepine (CARBATROL) 300 MG 12 hr capsule 300 mg every 12 (twelve) hours   1    fexofenadine-pseudoephedrine (ALLEGRA-D)  MG per tablet Take 1 tablet by mouth 2 (two) times a day      fluticasone (FLONASE) 50 mcg/act nasal spray 1 spray into each nostril daily      ibuprofen (MOTRIN) 200 mg tablet Take 200 mg by mouth every 6 (six) hours as needed for mild pain       lidocaine-prilocaine (EMLA) cream lidocaine-prilocaine 2 5 %-2 5 % topical cream      SUMAtriptan (IMITREX) 100 mg tablet Take 1 tablet (100 mg total) by mouth once as needed for migraine for up to 1 dose 15 tablet 3    valACYclovir (VALTREX) 500 mg tablet 500 mg daily after dinner   2     No current facility-administered medications for this visit  Physical Exam  Constitutional:       Appearance: Normal appearance  HENT:      Head: Normocephalic and atraumatic  Nose: Nose normal    Eyes:      Conjunctiva/sclera: Conjunctivae normal    Cardiovascular:      Rate and Rhythm: Normal rate and regular rhythm  Pulses: Normal pulses  Heart sounds: Normal heart sounds  Pulmonary:      Effort: Pulmonary effort is normal       Breath sounds: Normal breath sounds  Skin:     General: Skin is warm and dry  Findings: Rash present  Comments: Very faint tiny scratch noted on right hand and area is well approximated   No signs of infection noted at this time   Neurological:      Mental Status: She is alert and oriented to person, place, and time  Psychiatric:         Mood and Affect: Mood normal          Behavior: Behavior normal          Thought Content:  Thought content normal          Judgment: Judgment normal                      Wynelle Gosselin, CRNP

## 2021-08-21 DIAGNOSIS — E78.2 HYPERLIPEMIA, MIXED: ICD-10-CM

## 2021-08-23 RX ORDER — ATORVASTATIN CALCIUM 10 MG/1
10 TABLET, FILM COATED ORAL DAILY
Qty: 30 TABLET | Refills: 0 | Status: SHIPPED | OUTPATIENT
Start: 2021-08-23 | End: 2021-09-30 | Stop reason: SDUPTHER

## 2021-09-30 DIAGNOSIS — E78.2 HYPERLIPEMIA, MIXED: ICD-10-CM

## 2021-09-30 DIAGNOSIS — G43.109 MIGRAINE WITH AURA AND WITHOUT STATUS MIGRAINOSUS, NOT INTRACTABLE: ICD-10-CM

## 2021-09-30 RX ORDER — ATORVASTATIN CALCIUM 10 MG/1
10 TABLET, FILM COATED ORAL DAILY
Qty: 30 TABLET | Refills: 2 | Status: SHIPPED | OUTPATIENT
Start: 2021-09-30 | End: 2021-12-07 | Stop reason: SDUPTHER

## 2021-09-30 RX ORDER — SUMATRIPTAN 100 MG/1
100 TABLET, FILM COATED ORAL ONCE AS NEEDED
Qty: 15 TABLET | Refills: 0 | Status: SHIPPED | OUTPATIENT
Start: 2021-09-30 | End: 2021-11-04 | Stop reason: SDUPTHER

## 2021-10-05 ENCOUNTER — TELEPHONE (OUTPATIENT)
Dept: FAMILY MEDICINE CLINIC | Facility: CLINIC | Age: 46
End: 2021-10-05

## 2021-10-15 ENCOUNTER — OFFICE VISIT (OUTPATIENT)
Dept: FAMILY MEDICINE CLINIC | Facility: CLINIC | Age: 46
End: 2021-10-15
Payer: COMMERCIAL

## 2021-10-15 VITALS
SYSTOLIC BLOOD PRESSURE: 110 MMHG | RESPIRATION RATE: 16 BRPM | TEMPERATURE: 96.9 F | WEIGHT: 130 LBS | BODY MASS INDEX: 24.55 KG/M2 | HEART RATE: 76 BPM | HEIGHT: 61 IN | DIASTOLIC BLOOD PRESSURE: 70 MMHG

## 2021-10-15 DIAGNOSIS — B34.9 VIRAL INFECTION, UNSPECIFIED: Primary | ICD-10-CM

## 2021-10-15 DIAGNOSIS — Z20.822 EXPOSURE TO COVID-19 VIRUS: ICD-10-CM

## 2021-10-15 PROCEDURE — 99213 OFFICE O/P EST LOW 20 MIN: CPT | Performed by: NURSE PRACTITIONER

## 2021-10-15 PROCEDURE — 1036F TOBACCO NON-USER: CPT | Performed by: NURSE PRACTITIONER

## 2021-10-15 PROCEDURE — 3008F BODY MASS INDEX DOCD: CPT | Performed by: NURSE PRACTITIONER

## 2021-10-15 PROCEDURE — U0005 INFEC AGEN DETEC AMPLI PROBE: HCPCS | Performed by: NURSE PRACTITIONER

## 2021-10-15 PROCEDURE — U0003 INFECTIOUS AGENT DETECTION BY NUCLEIC ACID (DNA OR RNA); SEVERE ACUTE RESPIRATORY SYNDROME CORONAVIRUS 2 (SARS-COV-2) (CORONAVIRUS DISEASE [COVID-19]), AMPLIFIED PROBE TECHNIQUE, MAKING USE OF HIGH THROUGHPUT TECHNOLOGIES AS DESCRIBED BY CMS-2020-01-R: HCPCS | Performed by: NURSE PRACTITIONER

## 2021-10-16 LAB — SARS-COV-2 RNA RESP QL NAA+PROBE: NEGATIVE

## 2021-11-04 DIAGNOSIS — G43.109 MIGRAINE WITH AURA AND WITHOUT STATUS MIGRAINOSUS, NOT INTRACTABLE: ICD-10-CM

## 2021-11-05 RX ORDER — SUMATRIPTAN 100 MG/1
100 TABLET, FILM COATED ORAL ONCE AS NEEDED
Qty: 15 TABLET | Refills: 0 | Status: SHIPPED | OUTPATIENT
Start: 2021-11-05 | End: 2021-12-07 | Stop reason: SDUPTHER

## 2021-11-23 ENCOUNTER — HOSPITAL ENCOUNTER (OUTPATIENT)
Dept: NEUROLOGY | Facility: CLINIC | Age: 46
Discharge: HOME | End: 2021-11-23
Payer: COMMERCIAL

## 2021-11-23 DIAGNOSIS — G40.209 LOCALIZATION-RELATED (FOCAL) (PARTIAL) SYMPTOMATIC EPILEPSY AND EPILEPTIC SYNDROMES WITH COMPLEX PARTIAL SEIZURES, NOT INTRACTABLE, WITHOUT STATUS EPILEPTICUS (HCC): ICD-10-CM

## 2021-11-23 PROCEDURE — 95816 EEG AWAKE AND DROWSY: CPT

## 2021-12-07 DIAGNOSIS — E78.2 HYPERLIPEMIA, MIXED: ICD-10-CM

## 2021-12-07 DIAGNOSIS — G43.109 MIGRAINE WITH AURA AND WITHOUT STATUS MIGRAINOSUS, NOT INTRACTABLE: ICD-10-CM

## 2021-12-07 RX ORDER — SUMATRIPTAN 100 MG/1
100 TABLET, FILM COATED ORAL ONCE AS NEEDED
Qty: 15 TABLET | Refills: 0 | Status: SHIPPED | OUTPATIENT
Start: 2021-12-07 | End: 2022-01-07 | Stop reason: SDUPTHER

## 2021-12-07 RX ORDER — ATORVASTATIN CALCIUM 10 MG/1
10 TABLET, FILM COATED ORAL DAILY
Qty: 30 TABLET | Refills: 0 | Status: SHIPPED | OUTPATIENT
Start: 2021-12-07 | End: 2022-01-12 | Stop reason: SDUPTHER

## 2021-12-17 ENCOUNTER — OFFICE VISIT (OUTPATIENT)
Dept: FAMILY MEDICINE CLINIC | Facility: CLINIC | Age: 46
End: 2021-12-17
Payer: COMMERCIAL

## 2021-12-17 VITALS
SYSTOLIC BLOOD PRESSURE: 126 MMHG | HEART RATE: 100 BPM | WEIGHT: 133 LBS | DIASTOLIC BLOOD PRESSURE: 70 MMHG | BODY MASS INDEX: 25.11 KG/M2 | TEMPERATURE: 99.1 F | HEIGHT: 61 IN | RESPIRATION RATE: 16 BRPM

## 2021-12-17 DIAGNOSIS — Z00.00 WELL ADULT EXAM: Primary | ICD-10-CM

## 2021-12-17 DIAGNOSIS — Z12.31 SCREENING MAMMOGRAM FOR HIGH-RISK PATIENT: ICD-10-CM

## 2021-12-17 DIAGNOSIS — Z12.11 SCREEN FOR COLON CANCER: ICD-10-CM

## 2021-12-17 DIAGNOSIS — E78.2 HYPERLIPEMIA, MIXED: ICD-10-CM

## 2021-12-17 DIAGNOSIS — J31.0 CHRONIC RHINITIS: ICD-10-CM

## 2021-12-17 DIAGNOSIS — Z23 NEED FOR INFLUENZA VACCINATION: ICD-10-CM

## 2021-12-17 DIAGNOSIS — Z13.6 SCREENING FOR CARDIOVASCULAR CONDITION: ICD-10-CM

## 2021-12-17 DIAGNOSIS — Z11.59 NEED FOR HEPATITIS C SCREENING TEST: ICD-10-CM

## 2021-12-17 PROCEDURE — 90471 IMMUNIZATION ADMIN: CPT

## 2021-12-17 PROCEDURE — 1036F TOBACCO NON-USER: CPT | Performed by: FAMILY MEDICINE

## 2021-12-17 PROCEDURE — 3008F BODY MASS INDEX DOCD: CPT | Performed by: FAMILY MEDICINE

## 2021-12-17 PROCEDURE — 90686 IIV4 VACC NO PRSV 0.5 ML IM: CPT

## 2021-12-17 PROCEDURE — 3725F SCREEN DEPRESSION PERFORMED: CPT | Performed by: FAMILY MEDICINE

## 2021-12-17 PROCEDURE — 99396 PREV VISIT EST AGE 40-64: CPT | Performed by: FAMILY MEDICINE

## 2021-12-17 RX ORDER — MONTELUKAST SODIUM 10 MG/1
10 TABLET ORAL
Qty: 30 TABLET | Refills: 3 | Status: SHIPPED | OUTPATIENT
Start: 2021-12-17 | End: 2022-07-11

## 2021-12-19 NOTE — PRE-PROCEDURE INSTRUCTIONS
Pre-Surgery Instructions:   Medication Instructions    acetaminophen (TYLENOL) 325 mg tablet Instructed patient per Anesthesia Guidelines   carBAMazepine (CARBATROL) 300 MG 12 hr capsule Instructed patient per Anesthesia Guidelines   ibuprofen (MOTRIN) 200 mg tablet Instructed patient per Anesthesia Guidelines   SUMAtriptan (IMITREX) 100 mg tablet Instructed patient per Anesthesia Guidelines   valACYclovir (VALTREX) 500 mg tablet Instructed patient per Anesthesia Guidelines      Pre-op medication, and showering instructions with antibacteral soap reviewed 4 = No assist / stand by assistance

## 2022-01-07 DIAGNOSIS — G43.109 MIGRAINE WITH AURA AND WITHOUT STATUS MIGRAINOSUS, NOT INTRACTABLE: ICD-10-CM

## 2022-01-07 RX ORDER — SUMATRIPTAN 100 MG/1
100 TABLET, FILM COATED ORAL ONCE AS NEEDED
Qty: 15 TABLET | Refills: 0 | Status: SHIPPED | OUTPATIENT
Start: 2022-01-07 | End: 2022-02-05 | Stop reason: SDUPTHER

## 2022-01-12 DIAGNOSIS — E78.2 HYPERLIPEMIA, MIXED: ICD-10-CM

## 2022-01-12 RX ORDER — ATORVASTATIN CALCIUM 10 MG/1
10 TABLET, FILM COATED ORAL DAILY
Qty: 30 TABLET | Refills: 0 | Status: SHIPPED | OUTPATIENT
Start: 2022-01-12 | End: 2022-02-11 | Stop reason: SDUPTHER

## 2022-01-14 ENCOUNTER — OFFICE VISIT (OUTPATIENT)
Dept: FAMILY MEDICINE CLINIC | Facility: CLINIC | Age: 47
End: 2022-01-14
Payer: COMMERCIAL

## 2022-01-14 VITALS
WEIGHT: 128 LBS | RESPIRATION RATE: 18 BRPM | TEMPERATURE: 98.2 F | HEART RATE: 94 BPM | HEIGHT: 61 IN | SYSTOLIC BLOOD PRESSURE: 120 MMHG | OXYGEN SATURATION: 99 % | DIASTOLIC BLOOD PRESSURE: 70 MMHG | BODY MASS INDEX: 24.17 KG/M2

## 2022-01-14 DIAGNOSIS — J01.90 ACUTE BACTERIAL SINUSITIS: Primary | ICD-10-CM

## 2022-01-14 DIAGNOSIS — B96.89 ACUTE BACTERIAL SINUSITIS: Primary | ICD-10-CM

## 2022-01-14 PROCEDURE — 99213 OFFICE O/P EST LOW 20 MIN: CPT | Performed by: FAMILY MEDICINE

## 2022-01-14 PROCEDURE — 1036F TOBACCO NON-USER: CPT | Performed by: FAMILY MEDICINE

## 2022-01-14 PROCEDURE — 3725F SCREEN DEPRESSION PERFORMED: CPT | Performed by: FAMILY MEDICINE

## 2022-01-14 PROCEDURE — 3008F BODY MASS INDEX DOCD: CPT | Performed by: FAMILY MEDICINE

## 2022-01-14 RX ORDER — PREDNISONE 20 MG/1
TABLET ORAL
Qty: 32 TABLET | Refills: 0 | Status: SHIPPED | OUTPATIENT
Start: 2022-01-14 | End: 2022-07-11

## 2022-01-14 RX ORDER — AMOXICILLIN AND CLAVULANATE POTASSIUM 875; 125 MG/1; MG/1
1 TABLET, FILM COATED ORAL EVERY 12 HOURS SCHEDULED
Qty: 20 TABLET | Refills: 0 | Status: SHIPPED | OUTPATIENT
Start: 2022-01-14 | End: 2022-01-24

## 2022-01-14 NOTE — PROGRESS NOTES
Assessment/Plan:    1  Acute bacterial sinusitis  -     amoxicillin-clavulanate (Augmentin) 875-125 mg per tablet; Take 1 tablet by mouth every 12 (twelve) hours for 10 days  -     predniSONE 20 mg tablet; 4 tabs for three days, 3 tabs for three days, 2 tabs for three days, 1 tab for three days, 1/2 tab for 4 days            There are no Patient Instructions on file for this visit  No follow-ups on file  Subjective:      Patient ID: Tevin De La Vega is a 55 y o  female  Chief Complaint   Patient presents with    Headache     multiple tests for covid negative  [id number]    sinus drainage       Pt states she has been struggling with bad ha for two weeks  Her son had covid on xmas  Pt's  had covid on the 28th of December  Pt also got sick around that time - tested negative for covid - body achs, cough congestion HA etc  Since then she has had bad HA that are not going away  On and off congestion  She is unsure if she has a lingering cold that are triggering her migrains      The following portions of the patient's history were reviewed and updated as appropriate: allergies, current medications, past family history, past medical history, past social history, past surgical history and problem list     Review of Systems   HENT: Positive for congestion and sinus pressure  Neurological: Positive for headaches           Current Outpatient Medications   Medication Sig Dispense Refill    atorvastatin (LIPITOR) 10 mg tablet Take 1 tablet (10 mg total) by mouth daily 30 tablet 0    carBAMazepine (CARBATROL) 300 MG 12 hr capsule 300 mg every 12 (twelve) hours   1    fluticasone (FLONASE) 50 mcg/act nasal spray 1 spray into each nostril daily as needed       ibuprofen (MOTRIN) 200 mg tablet Take 200 mg by mouth every 6 (six) hours as needed for mild pain       montelukast (SINGULAIR) 10 mg tablet Take 1 tablet (10 mg total) by mouth daily at bedtime 30 tablet 3    SUMAtriptan (IMITREX) 100 mg tablet Take 1 tablet (100 mg total) by mouth once as needed for migraine for up to 1 dose 15 tablet 0    valACYclovir (VALTREX) 500 mg tablet 500 mg daily after dinner   2    amoxicillin-clavulanate (Augmentin) 875-125 mg per tablet Take 1 tablet by mouth every 12 (twelve) hours for 10 days 20 tablet 0    predniSONE 20 mg tablet 4 tabs for three days, 3 tabs for three days, 2 tabs for three days, 1 tab for three days, 1/2 tab for 4 days 32 tablet 0     No current facility-administered medications for this visit  Objective:    /70   Pulse 94   Temp 98 2 °F (36 8 °C)   Resp 18   Ht 5' 1" (1 549 m)   Wt 58 1 kg (128 lb)   LMP 12/28/2021 (Approximate)   SpO2 99%   BMI 24 19 kg/m²        Physical Exam  HENT:      Nose: Congestion and rhinorrhea present                  Romy Castrejon, DO

## 2022-02-05 DIAGNOSIS — G43.109 MIGRAINE WITH AURA AND WITHOUT STATUS MIGRAINOSUS, NOT INTRACTABLE: ICD-10-CM

## 2022-02-05 RX ORDER — SUMATRIPTAN 100 MG/1
100 TABLET, FILM COATED ORAL ONCE AS NEEDED
Qty: 15 TABLET | Refills: 0 | Status: SHIPPED | OUTPATIENT
Start: 2022-02-05 | End: 2022-03-12 | Stop reason: SDUPTHER

## 2022-02-11 DIAGNOSIS — E78.2 HYPERLIPEMIA, MIXED: ICD-10-CM

## 2022-02-11 RX ORDER — ATORVASTATIN CALCIUM 10 MG/1
10 TABLET, FILM COATED ORAL DAILY
Qty: 30 TABLET | Refills: 0 | Status: SHIPPED | OUTPATIENT
Start: 2022-02-11 | End: 2022-03-12 | Stop reason: SDUPTHER

## 2022-02-12 LAB
ALBUMIN SERPL-MCNC: 4.1 G/DL (ref 3.8–4.8)
ALBUMIN/GLOB SERPL: 2.3 {RATIO} (ref 1.2–2.2)
ALP SERPL-CCNC: 78 IU/L (ref 44–121)
ALT SERPL-CCNC: 17 IU/L (ref 0–32)
AST SERPL-CCNC: 20 IU/L (ref 0–40)
BILIRUB SERPL-MCNC: 0.3 MG/DL (ref 0–1.2)
BUN SERPL-MCNC: 13 MG/DL (ref 6–24)
BUN/CREAT SERPL: 18 (ref 9–23)
CALCIUM SERPL-MCNC: 8.8 MG/DL (ref 8.7–10.2)
CHLORIDE SERPL-SCNC: 105 MMOL/L (ref 96–106)
CHOLEST SERPL-MCNC: 218 MG/DL (ref 100–199)
CO2 SERPL-SCNC: 21 MMOL/L (ref 20–29)
CREAT SERPL-MCNC: 0.71 MG/DL (ref 0.57–1)
GLOBULIN SER-MCNC: 1.8 G/DL (ref 1.5–4.5)
GLUCOSE SERPL-MCNC: 94 MG/DL (ref 65–99)
HCV AB S/CO SERPL IA: <0.1 S/CO RATIO (ref 0–0.9)
HDLC SERPL-MCNC: 101 MG/DL
LDLC SERPL CALC-MCNC: 107 MG/DL (ref 0–99)
MICRODELETION SYND BLD/T FISH: NORMAL
POTASSIUM SERPL-SCNC: 4.3 MMOL/L (ref 3.5–5.2)
PROT SERPL-MCNC: 5.9 G/DL (ref 6–8.5)
SL AMB EGFR AFRICAN AMERICAN: 118 ML/MIN/1.73
SL AMB EGFR NON AFRICAN AMERICAN: 102 ML/MIN/1.73
SODIUM SERPL-SCNC: 142 MMOL/L (ref 134–144)
TRIGL SERPL-MCNC: 59 MG/DL (ref 0–149)

## 2022-02-14 NOTE — RESULT ENCOUNTER NOTE
Lipids are elevated, I would suggest increasing the stain to 20mg    If this is ok let me know and I will order the change and labs to follow up

## 2022-02-15 ENCOUNTER — HOSPITAL ENCOUNTER (OUTPATIENT)
Dept: RADIOLOGY | Facility: HOSPITAL | Age: 47
Discharge: HOME | End: 2022-02-15
Attending: FAMILY MEDICINE
Payer: COMMERCIAL

## 2022-02-15 VITALS — HEIGHT: 61 IN | WEIGHT: 128 LBS | BODY MASS INDEX: 24.17 KG/M2

## 2022-02-15 DIAGNOSIS — Z12.31 SCREENING MAMMOGRAM FOR HIGH-RISK PATIENT: ICD-10-CM

## 2022-02-15 PROCEDURE — 77067 SCR MAMMO BI INCL CAD: CPT

## 2022-02-15 PROCEDURE — 77063 BREAST TOMOSYNTHESIS BI: CPT

## 2022-03-12 DIAGNOSIS — G43.109 MIGRAINE WITH AURA AND WITHOUT STATUS MIGRAINOSUS, NOT INTRACTABLE: ICD-10-CM

## 2022-03-12 DIAGNOSIS — E78.2 HYPERLIPEMIA, MIXED: ICD-10-CM

## 2022-03-14 RX ORDER — SUMATRIPTAN 100 MG/1
100 TABLET, FILM COATED ORAL ONCE AS NEEDED
Qty: 15 TABLET | Refills: 0 | Status: SHIPPED | OUTPATIENT
Start: 2022-03-14 | End: 2022-04-15 | Stop reason: SDUPTHER

## 2022-03-14 RX ORDER — ATORVASTATIN CALCIUM 10 MG/1
10 TABLET, FILM COATED ORAL DAILY
Qty: 30 TABLET | Refills: 0 | Status: SHIPPED | OUTPATIENT
Start: 2022-03-14 | End: 2022-04-15 | Stop reason: SDUPTHER

## 2022-04-15 DIAGNOSIS — G43.109 MIGRAINE WITH AURA AND WITHOUT STATUS MIGRAINOSUS, NOT INTRACTABLE: ICD-10-CM

## 2022-04-15 DIAGNOSIS — E78.2 HYPERLIPEMIA, MIXED: ICD-10-CM

## 2022-04-16 RX ORDER — ATORVASTATIN CALCIUM 10 MG/1
10 TABLET, FILM COATED ORAL DAILY
Qty: 30 TABLET | Refills: 0 | Status: SHIPPED | OUTPATIENT
Start: 2022-04-16 | End: 2022-05-16 | Stop reason: SDUPTHER

## 2022-04-16 RX ORDER — SUMATRIPTAN 100 MG/1
100 TABLET, FILM COATED ORAL ONCE AS NEEDED
Qty: 15 TABLET | Refills: 0 | Status: SHIPPED | OUTPATIENT
Start: 2022-04-16 | End: 2022-05-16 | Stop reason: SDUPTHER

## 2022-05-03 ENCOUNTER — APPOINTMENT (OUTPATIENT)
Dept: RADIOLOGY | Facility: CLINIC | Age: 47
End: 2022-05-03
Payer: COMMERCIAL

## 2022-05-03 ENCOUNTER — OFFICE VISIT (OUTPATIENT)
Dept: OBGYN CLINIC | Facility: CLINIC | Age: 47
End: 2022-05-03
Payer: COMMERCIAL

## 2022-05-03 VITALS
HEART RATE: 97 BPM | BODY MASS INDEX: 26.21 KG/M2 | SYSTOLIC BLOOD PRESSURE: 134 MMHG | HEIGHT: 61 IN | WEIGHT: 138.8 LBS | DIASTOLIC BLOOD PRESSURE: 81 MMHG

## 2022-05-03 DIAGNOSIS — M72.2 PLANTAR FASCIITIS OF LEFT FOOT: Primary | ICD-10-CM

## 2022-05-03 DIAGNOSIS — M79.672 PAIN IN LEFT FOOT: ICD-10-CM

## 2022-05-03 PROCEDURE — 1036F TOBACCO NON-USER: CPT | Performed by: ORTHOPAEDIC SURGERY

## 2022-05-03 PROCEDURE — 73630 X-RAY EXAM OF FOOT: CPT

## 2022-05-03 PROCEDURE — 99203 OFFICE O/P NEW LOW 30 MIN: CPT | Performed by: ORTHOPAEDIC SURGERY

## 2022-05-03 PROCEDURE — 3008F BODY MASS INDEX DOCD: CPT | Performed by: ORTHOPAEDIC SURGERY

## 2022-05-03 NOTE — PROGRESS NOTES
Assessment/Plan:  1  Plantar fasciitis of left foot  XR foot 3+ vw left    Durable Medical Equipment       Scribe Attestation    I,:  Julia Sepulveda am acting as a scribe while in the presence of the attending physician :       I,:  Joe Juarez MD personally performed the services described in this documentation    as scribed in my presence :         Rachel Jj upon exam and review of the xray's of the left foot demonstrate signs and symptoms consistent with plantar fasciitis  She does demonstrate function range of motion and strength of the foot and ankle  I did discuss treatment for fasciitis with her today in the form of the use of comfortable shoes that are well padded and have a support for the arch of her foot  He has had be worn at all times  I did also have her fitted for a night splint that will be worn at night to help stretch her plantar fascia as well as her Achilles tendon  I did also discuss and demonstrate calf strengthening and stretching exercises that she is to do 3 times a day  She verbalized understanding was amenable to the treatment plan presented to her today  If she fails that symptomatic relief with the home treatment after 6-8 weeks she may follow up with me  Otherwise, she may follow up with me on an as-needed basis  Subjective:   Zenobia Shelton is a 55 y o  female who presents to the office today for initial evaluation of her left foot  She has been experiencing activity related pain to the plantar aspect of her foot and heel over the past several weeks  She denies a traumatic injury resulting in her painful symptoms  She states that her pain is typically worse after prolonged weight-bearing  She describes as an intermittent and moderate and sometimes severe sharp pain  She notes that her pain is better while nonweightbearing or at rest   However, notes that on occasion she will wake up at night due to throbbing pain    She denies any swelling or redness of the foot or ankle   She also denies any range of motion deficits of the foot or ankle  Today she denies any distal paresthesias  She has utilized of inserts with mild symptomatic relief  Today she denies any distal paresthesias  Review of Systems   Constitutional: Negative for chills, fever and unexpected weight change  HENT: Negative for hearing loss, nosebleeds and sore throat  Eyes: Negative for pain, redness and visual disturbance  Respiratory: Negative for cough, shortness of breath and wheezing  Cardiovascular: Negative for chest pain, palpitations and leg swelling  Gastrointestinal: Negative for abdominal pain, nausea and vomiting  Endocrine: Negative for polydipsia and polyuria  Genitourinary: Negative for dysuria and hematuria  Musculoskeletal: Positive for arthralgias and myalgias  See HPI   Skin: Negative for rash and wound  Neurological: Negative for dizziness, numbness and headaches  Psychiatric/Behavioral: Negative for decreased concentration and suicidal ideas  The patient is not nervous/anxious            Past Medical History:   Diagnosis Date    Cervical radiculopathy     RESOLVED: [id number] buldging disc    Kidney stone     Seizure [facility])     age 25       Past Surgical History:   Procedure Laterality Date    ADENOIDECTOMY      COLONOSCOPY      LITHOTRIPSY  2006    RENAL    OVARIAN CYST REMOVAL  2012    TX HYSTEROSCOPY,W/ENDO BX N/A 10/28/2019    Procedure: OPERATIVE HYSTEROSCOPY (MYOSURE),POLYPECTOMY,D AND C;  Surgeon: Annette Luke MD;  [facility];  Service: Gynecology       Family History   Problem Relation Age of Onset    Multiple sclerosis Mother     Colon cancer Maternal Grandmother 79    Rheum arthritis Maternal Grandmother     Arthritis Family     Colon cancer Family     Osteoporosis Family     Anxiety disorder Father     Chromosomal disorder Cousin     No Known Problems Sister     No Known Problems Daughter     Skin cancer Maternal Grandfather 72        not sure of exact age   No Known Problems Paternal Grandmother     No Known Problems Paternal Grandfather     No Known Problems Daughter     No Known Problems Son     No Known Problems Maternal Aunt     No Known Problems Maternal Aunt     No Known Problems Paternal Aunt        Social History     Occupational History    Not on file   Tobacco Use    Smoking status: Former Smoker     Quit date: 5/6/2007     Years since quitting: 15 0    Smokeless tobacco: Never Used   Vaping Use    Vaping Use: Never used   Substance and Sexual Activity    Alcohol use: Not Currently     Comment: rare    Drug use: Never    Sexual activity: Not on file         Current Outpatient Medications:     atorvastatin (LIPITOR) 10 mg tablet, Take 1 tablet (10 mg total) by mouth daily, Disp: 30 tablet, Rfl: 0    carBAMazepine (CARBATROL) 300 MG 12 hr capsule, 300 mg every 12 (twelve) hours , Disp: , Rfl: 1    fluticasone (FLONASE) 50 mcg/act nasal spray, 1 spray into each nostril daily as needed , Disp: , Rfl:     ibuprofen (MOTRIN) 200 mg tablet, Take 200 mg by mouth every 6 (six) hours as needed for mild pain , Disp: , Rfl:     montelukast (SINGULAIR) 10 mg tablet, Take 1 tablet (10 mg total) by mouth daily at bedtime, Disp: 30 tablet, Rfl: 3    SUMAtriptan (IMITREX) 100 mg tablet, Take 1 tablet (100 mg total) by mouth once as needed for migraine for up to 1 dose, Disp: 15 tablet, Rfl: 0    valACYclovir (VALTREX) 500 mg tablet, 500 mg daily after dinner , Disp: , Rfl: 2    predniSONE 20 mg tablet, 4 tabs for three days, 3 tabs for three days, 2 tabs for three days, 1 tab for three days, 1/2 tab for 4 days (Patient not taking: Reported on 5/3/2022 ), Disp: 32 tablet, Rfl: 0    Allergies   Allergen Reactions    Ciprofloxacin Lightheadedness     dizzy    Phenytoin Rash     Reaction Date: 05Oct2010;         Objective:  Vitals:    05/03/22 1151   BP: 134/81   Pulse: 97       Left Ankle Exam     Tenderness Left ankle tenderness location: medial calcaneal tubricle  Swelling: none    Range of Motion   Dorsiflexion: 25   Plantar flexion: 45     Muscle Strength   Dorsiflexion:  5/5   Plantar flexion:  5/5   Anterior tibial:  5/5   Posterior tibial:  5/5  Gastrocsoleus:  5/5  Peroneal muscle:  5/5    Other   Erythema: absent  Scars: absent  Sensation: normal  Pulse: present          Observations   Left Ankle/Foot   Negative for adhesive scar  Strength/Myotome Testing     Left Ankle/Foot   Dorsiflexion: 5  Plantar flexion: 5      Physical Exam  Vitals reviewed  HENT:      Head: Normocephalic and atraumatic  Eyes:      General:         Right eye: No discharge  Left eye: No discharge  Conjunctiva/sclera: Conjunctivae normal       Pupils: Pupils are equal, round, and reactive to light  Cardiovascular:      Rate and Rhythm: Normal rate  Pulmonary:      Effort: Pulmonary effort is normal  No respiratory distress  Musculoskeletal:      Cervical back: Normal range of motion and neck supple  Comments: As noted in HPI   Skin:     General: Skin is warm and dry  Neurological:      Mental Status: She is alert and oriented to person, place, and time  I have personally reviewed pertinent films in PACS and my interpretation is as follows:    X-rays of the left foot demonstrate no acute fracture  No obvious lytic or blastic lesions

## 2022-05-16 DIAGNOSIS — E78.2 HYPERLIPEMIA, MIXED: ICD-10-CM

## 2022-05-16 DIAGNOSIS — G43.109 MIGRAINE WITH AURA AND WITHOUT STATUS MIGRAINOSUS, NOT INTRACTABLE: ICD-10-CM

## 2022-05-16 RX ORDER — SUMATRIPTAN 100 MG/1
100 TABLET, FILM COATED ORAL ONCE AS NEEDED
Qty: 15 TABLET | Refills: 0 | Status: SHIPPED | OUTPATIENT
Start: 2022-05-16 | End: 2022-06-15 | Stop reason: SDUPTHER

## 2022-05-16 RX ORDER — ATORVASTATIN CALCIUM 10 MG/1
10 TABLET, FILM COATED ORAL DAILY
Qty: 30 TABLET | Refills: 0 | Status: SHIPPED | OUTPATIENT
Start: 2022-05-16 | End: 2022-06-15 | Stop reason: SDUPTHER

## 2022-06-15 DIAGNOSIS — G43.109 MIGRAINE WITH AURA AND WITHOUT STATUS MIGRAINOSUS, NOT INTRACTABLE: ICD-10-CM

## 2022-06-15 DIAGNOSIS — E78.2 HYPERLIPEMIA, MIXED: ICD-10-CM

## 2022-06-15 RX ORDER — SUMATRIPTAN 100 MG/1
100 TABLET, FILM COATED ORAL ONCE AS NEEDED
Qty: 15 TABLET | Refills: 0 | Status: SHIPPED | OUTPATIENT
Start: 2022-06-15 | End: 2022-07-18 | Stop reason: SDUPTHER

## 2022-06-15 RX ORDER — ATORVASTATIN CALCIUM 10 MG/1
10 TABLET, FILM COATED ORAL DAILY
Qty: 30 TABLET | Refills: 0 | Status: SHIPPED | OUTPATIENT
Start: 2022-06-15 | End: 2022-07-28 | Stop reason: SDUPTHER

## 2022-07-11 ENCOUNTER — OFFICE VISIT (OUTPATIENT)
Dept: FAMILY MEDICINE CLINIC | Facility: CLINIC | Age: 47
End: 2022-07-11
Payer: COMMERCIAL

## 2022-07-11 VITALS
HEART RATE: 77 BPM | HEIGHT: 61 IN | RESPIRATION RATE: 18 BRPM | TEMPERATURE: 98.1 F | WEIGHT: 139 LBS | SYSTOLIC BLOOD PRESSURE: 122 MMHG | OXYGEN SATURATION: 100 % | DIASTOLIC BLOOD PRESSURE: 68 MMHG | BODY MASS INDEX: 26.24 KG/M2

## 2022-07-11 DIAGNOSIS — L29.9 PRURITUS: Primary | ICD-10-CM

## 2022-07-11 DIAGNOSIS — G40.209 LOCALIZATION-RELATED (FOCAL) (PARTIAL) SYMPTOMATIC EPILEPSY AND EPILEPTIC SYNDROMES WITH COMPLEX PARTIAL SEIZURES, NOT INTRACTABLE, WITHOUT STATUS EPILEPTICUS (HCC): ICD-10-CM

## 2022-07-11 PROCEDURE — 99213 OFFICE O/P EST LOW 20 MIN: CPT | Performed by: FAMILY MEDICINE

## 2022-07-11 PROCEDURE — 3725F SCREEN DEPRESSION PERFORMED: CPT | Performed by: FAMILY MEDICINE

## 2022-07-11 NOTE — PROGRESS NOTES
Assessment/Plan:    1  Pruritus  -     neomycin-polymyxin-hydrocortisone (CORTISPORIN) otic solution; Administer 4 drops to the right ear every 6 (six) hours for 7 days    2  Localization-related (focal) (partial) symptomatic epilepsy and epileptic syndromes with complex partial seizures, not intractable, [facility]          There are no Patient Instructions on file for this visit  No follow-ups on file  Subjective:      Patient ID: Grace Smith is a 55 y o  female  Chief Complaint   Patient presents with   Bgeliana Leon 1998      saw bump in one ear and patient has had itchiness for past 2 weeks  Mz cma       Itchy ears B/L  For weeks  Has tried peroxide  She felt clogged up but that was alos coming and going  Also felt congestion      The following portions of the patient's history were reviewed and updated as appropriate: allergies, current medications, past family history, past medical history, past social history, past surgical history and problem list     Review of Systems   Constitutional: Negative  Negative for activity change, appetite change, chills, diaphoresis and fatigue  HENT: Negative  Negative for dental problem, ear pain, sinus pressure and sore throat  Itcy ear canals   Eyes: Negative  Negative for photophobia, pain, discharge, redness, itching and visual disturbance  Respiratory: Negative for apnea and chest tightness  Cardiovascular: Negative  Negative for chest pain, palpitations and leg swelling  Gastrointestinal: Negative  Negative for abdominal distention, abdominal pain, constipation and diarrhea  Endocrine: Negative  Negative for cold intolerance and heat intolerance  Genitourinary: Negative  Negative for difficulty urinating and dyspareunia  Musculoskeletal: Negative  Negative for arthralgias and back pain  Skin: Negative  Allergic/Immunologic: Negative for environmental allergies  Neurological: Negative  Negative for dizziness  Psychiatric/Behavioral: Negative  Negative for agitation  Current Outpatient Medications   Medication Sig Dispense Refill    atorvastatin (LIPITOR) 10 mg tablet Take 1 tablet (10 mg total) by mouth daily 30 tablet 0    carBAMazepine (CARBATROL) 300 MG 12 hr capsule 300 mg every 12 (twelve) hours   1    fluticasone (FLONASE) 50 mcg/act nasal spray 1 spray into each nostril daily as needed       ibuprofen (MOTRIN) 200 mg tablet Take 200 mg by mouth every 6 (six) hours as needed for mild pain       neomycin-polymyxin-hydrocortisone (CORTISPORIN) otic solution Administer 4 drops to the right ear every 6 (six) hours for 7 days 10 mL 0    SUMAtriptan (IMITREX) 100 mg tablet Take 1 tablet (100 mg total) by mouth once as needed for migraine for up to 1 dose 15 tablet 0    valACYclovir (VALTREX) 500 mg tablet 500 mg daily after dinner   2     No current facility-administered medications for this visit         Objective:    /68   Pulse 77   Temp 98 1 °F (36 7 °C)   Resp 18   Ht 5' 1" (1 549 m)   Wt 63 kg (139 lb)   SpO2 100%   BMI 26 26 kg/m²        Physical Exam  HENT:      Ears:      Comments: Erythema in canals b/l               Magui Russo DO

## 2022-07-18 DIAGNOSIS — G43.109 MIGRAINE WITH AURA AND WITHOUT STATUS MIGRAINOSUS, NOT INTRACTABLE: ICD-10-CM

## 2022-07-19 RX ORDER — SUMATRIPTAN 100 MG/1
100 TABLET, FILM COATED ORAL ONCE AS NEEDED
Qty: 15 TABLET | Refills: 0 | Status: SHIPPED | OUTPATIENT
Start: 2022-07-19 | End: 2022-08-18 | Stop reason: SDUPTHER

## 2022-07-28 DIAGNOSIS — E78.2 HYPERLIPEMIA, MIXED: ICD-10-CM

## 2022-07-28 RX ORDER — ATORVASTATIN CALCIUM 10 MG/1
10 TABLET, FILM COATED ORAL DAILY
Qty: 30 TABLET | Refills: 0 | Status: SHIPPED | OUTPATIENT
Start: 2022-07-28 | End: 2022-09-01 | Stop reason: SDUPTHER

## 2022-08-18 DIAGNOSIS — G43.109 MIGRAINE WITH AURA AND WITHOUT STATUS MIGRAINOSUS, NOT INTRACTABLE: ICD-10-CM

## 2022-08-18 RX ORDER — SUMATRIPTAN 100 MG/1
100 TABLET, FILM COATED ORAL ONCE AS NEEDED
Qty: 15 TABLET | Refills: 0 | Status: SHIPPED | OUTPATIENT
Start: 2022-08-18 | End: 2022-09-22 | Stop reason: SDUPTHER

## 2022-09-01 DIAGNOSIS — E78.2 HYPERLIPEMIA, MIXED: ICD-10-CM

## 2022-09-01 RX ORDER — ATORVASTATIN CALCIUM 10 MG/1
TABLET, FILM COATED ORAL
Qty: 90 TABLET | Refills: 1 | Status: SHIPPED | OUTPATIENT
Start: 2022-09-01 | End: 2022-09-22 | Stop reason: SDUPTHER

## 2022-09-01 RX ORDER — ATORVASTATIN CALCIUM 10 MG/1
10 TABLET, FILM COATED ORAL DAILY
Qty: 30 TABLET | Refills: 0 | Status: SHIPPED | OUTPATIENT
Start: 2022-09-01 | End: 2022-09-01

## 2022-09-22 DIAGNOSIS — E78.2 HYPERLIPEMIA, MIXED: ICD-10-CM

## 2022-09-22 DIAGNOSIS — G43.109 MIGRAINE WITH AURA AND WITHOUT STATUS MIGRAINOSUS, NOT INTRACTABLE: ICD-10-CM

## 2022-09-23 RX ORDER — ATORVASTATIN CALCIUM 10 MG/1
10 TABLET, FILM COATED ORAL DAILY
Qty: 90 TABLET | Refills: 0 | Status: SHIPPED | OUTPATIENT
Start: 2022-09-23

## 2022-09-23 RX ORDER — SUMATRIPTAN 100 MG/1
100 TABLET, FILM COATED ORAL ONCE AS NEEDED
Qty: 15 TABLET | Refills: 0 | Status: SHIPPED | OUTPATIENT
Start: 2022-09-23

## 2022-10-19 ENCOUNTER — OFFICE VISIT (OUTPATIENT)
Dept: FAMILY MEDICINE CLINIC | Facility: CLINIC | Age: 47
End: 2022-10-19
Payer: COMMERCIAL

## 2022-10-19 VITALS
HEART RATE: 94 BPM | RESPIRATION RATE: 18 BRPM | OXYGEN SATURATION: 98 % | BODY MASS INDEX: 27.49 KG/M2 | DIASTOLIC BLOOD PRESSURE: 74 MMHG | HEIGHT: 61 IN | TEMPERATURE: 98.7 F | WEIGHT: 145.6 LBS | SYSTOLIC BLOOD PRESSURE: 122 MMHG
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
## 2022-10-19 DIAGNOSIS — L03.011 PARONYCHIA OF FINGER OF RIGHT HAND: Primary | ICD-10-CM

## 2022-10-19 PROCEDURE — 99213 OFFICE O/P EST LOW 20 MIN: CPT | Performed by: NURSE PRACTITIONER

## 2022-10-19 RX ORDER — AMOXICILLIN AND CLAVULANATE POTASSIUM 875; 125 MG/1; MG/1
1 TABLET, FILM COATED ORAL EVERY 12 HOURS SCHEDULED
Qty: 14 TABLET | Refills: 0 | Status: SHIPPED | OUTPATIENT
Start: 2022-10-19 | End: 2022-10-26

## 2022-10-19 NOTE — PATIENT INSTRUCTIONS
Amoxicillin/Clavulanate Potassium (By mouth)   Amoxicillin (g-rka-n-ALEXX-in), Clavulanate Potassium (NNHZ-xj-vq-britta wqt-BHZ-ow-um)  Treats infections  This medicine is a penicillin antibiotic  Brand Name(s): Augmentin, Augmentin ES-600, Augmentin XR   There may be other brand names for this medicine  When This Medicine Should Not Be Used: This medicine is not right for everyone  Do not use it if you had an allergic reaction to amoxicillin, clavulanate, or a similar antibiotic (penicillin or cephalosporin), or if you had liver problems caused by Augmentin®  How to Use This Medicine:   Liquid, Tablet, Chewable Tablet, Long Acting Tablet  Your doctor will tell you how much medicine to use  Do not use more than directed  Take this medicine with a snack or at the beginning of a meal to help prevent nausea  Chewable tablets: Chew the tablet completely before you swallow it  Measure the oral liquid medicine with a marked measuring spoon, oral syringe, or medicine cup  Shake the medicine well just before you measure each dose  Rinse the spoon or dropper after each use  Swallow the extended-release tablet whole  Do not crush, break, or chew it  Take all of the medicine in your prescription to clear up your infection, even if you feel better after the first few doses  Missed dose: Take a dose as soon as you remember  If it is almost time for your next dose, wait until then and take a regular dose  Do not take extra medicine to make up for a missed dose  Tablet, extended-release tablet, chewable tablet: Store at room temperature, away from heat, moisture, and direct light  Oral liquid: Store in the refrigerator  Do not freeze  Throw away any unused oral liquid after 10 days  Drugs and Foods to Avoid:   Ask your doctor or pharmacist before using any other medicine, including over-the-counter medicines, vitamins, and herbal products  Some medicines can affect how this medicine works   Tell your doctor if you are taking a blood thinner (such as warfarin), allopurinol, or probenecid  Warnings While Using This Medicine:   Tell your doctor if you are pregnant or breastfeeding, or if you have kidney disease, liver disease, or mononucleosis (mono)  Birth control pills may not work as well while you are taking this medicine  Use another form of birth control to prevent pregnancy  This medicine can cause diarrhea  Call your doctor if the diarrhea becomes severe, does not stop, or is bloody  Do not take any medicine to stop diarrhea until you have talked to your doctor  Diarrhea can occur 2 months or more after you stop taking this medicine  Tell any doctor or dentist who treats you that you are using this medicine  This medicine may affect certain medical test results  Call your doctor if your symptoms do not improve or if they get worse  The chewable tablet and oral liquid contain phenylalanine  Talk to your doctor before you use this medicine if you have phenylketonuria (PKU)  Keep all medicine out of the reach of children  Never share your medicine with anyone  Possible Side Effects While Using This Medicine:   Call your doctor right away if you notice any of these side effects: Allergic reaction: Itching or hives, swelling in your face or hands, swelling or tingling in your mouth or throat, chest tightness, trouble breathing  Blistering, peeling, red skin rash  Change in how much or how often you urinate  Dark urine or pale stools, nausea, vomiting, loss of appetite, stomach pain, yellow eyes or skin  Diarrhea that may contain blood, stomach cramps  If you notice these less serious side effects, talk with your doctor:   Diaper rash  Mild diarrhea, nausea, vomiting  Tooth discoloration (in children)  If you notice other side effects that you think are caused by this medicine, tell your doctor  Call your doctor for medical advice about side effects   You may report side effects to FDA at 2-817-FDA-5940    © Copyright [employer] 2022 Information is for [employer] use only and may not be sold, redistributed or otherwise used for commercial purposes  The above information is an  only  It is not intended as medical advice for individual conditions or treatments  Talk to your doctor, nurse or pharmacist before following any medical regimen to see if it is safe and effective for you

## 2022-10-19 NOTE — PROGRESS NOTES
Assessment/Plan:    1  Paronychia of finger of right hand    2  Dog scratch  -     amoxicillin-clavulanate (Augmentin) 875-125 mg per tablet; Take 1 tablet by mouth every 12 (twelve) hours for 7 days              Patient Instructions: Take medication with food  It is important that you take the entire course of antibiotics prescribed  May also take a probiotic of your choice to maintain healthy GI jason  Can take some probiotic and yogurt with the medication  Supportive care discussed and advised  Advised to RTO for any worsening and no improvement  Follow up for no improvement and worsening of conditions  Patient advised and educated when to see immediate medical care  Return if symptoms worsen or fail to improve  No future appointments  Subjective:      Patient ID: Huy Sanchez is a 52 y o  female  Chief Complaint   Patient presents with   • puppy scratch     Puppy scratch on finger saturday, noticed it was getting swollen and warm nm  lpn         Vitals:  /74   Pulse 94   Temp 98 7 °F (37 1 °C)   Resp 18   Ht 5' 1" (1 549 m)   Wt 66 kg (145 lb 9 6 oz)   LMP 10/02/2022 (Approximate)   SpO2 98%   BMI 27 51 kg/m²   Wt Readings from Last 3 Encounters:   10/19/22 66 kg (145 lb 9 6 oz)   07/11/22 63 kg (139 lb)   05/03/22 63 kg (138 lb 12 8 oz)      HPI  Patient has new puppy which is vaccinated and received his rabies vaccine on 10/9/2022 and then scratched close to right index finger cuticle with her teeth on 10/15/2022  Patient stated that area had scant bleeding which she cleaned at that time and none after that  Stated that just noticed mild swelling around nail bed  Denies fever and chills  Patient is uptodate on tdap           The following portions of the patient's history were reviewed and updated as appropriate: allergies, current medications, past family history, past medical history, past social history, past surgical history and problem list       Review of Systems   Constitutional: Negative  Respiratory: Negative  Cardiovascular: Negative  Skin:        As noted in HPI           Objective:    Social History     Tobacco Use   Smoking Status Former Smoker   • Quit date: 5/6/2007   • Years since quitting: 15 4   Smokeless Tobacco Never Used       Allergies: Allergies   Allergen Reactions   • Ciprofloxacin Lightheadedness     dizzy   • Phenytoin Rash     Reaction Date: 05Oct2010;          Current Outpatient Medications   Medication Sig Dispense Refill   • amoxicillin-clavulanate (Augmentin) 875-125 mg per tablet Take 1 tablet by mouth every 12 (twelve) hours for 7 days 14 tablet 0   • atorvastatin (LIPITOR) 10 mg tablet Take 1 tablet (10 mg total) by mouth daily 90 tablet 0   • carBAMazepine (CARBATROL) 300 MG 12 hr capsule 300 mg every 12 (twelve) hours   1   • ibuprofen (MOTRIN) 200 mg tablet Take 200 mg by mouth every 6 (six) hours as needed for mild pain      • SUMAtriptan (IMITREX) 100 mg tablet Take 1 tablet (100 mg total) by mouth once as needed for migraine for up to 1 dose 15 tablet 0   • valACYclovir (VALTREX) 500 mg tablet 500 mg daily after dinner   2   • fluticasone (FLONASE) 50 mcg/act nasal spray 1 spray into each nostril daily as needed  (Patient not taking: Reported on 10/19/2022)     • neomycin-polymyxin-hydrocortisone (CORTISPORIN) otic solution Administer 4 drops to the right ear every 6 (six) hours for 7 days (Patient not taking: Reported on 10/19/2022) 10 mL 0     No current facility-administered medications for this visit  Physical Exam  Constitutional:       Appearance: Normal appearance  HENT:      Head: Normocephalic and atraumatic  Nose: Nose normal    Eyes:      Conjunctiva/sclera: Conjunctivae normal    Cardiovascular:      Rate and Rhythm: Normal rate and regular rhythm  Pulses: Normal pulses  Heart sounds: Normal heart sounds     Pulmonary:      Effort: Pulmonary effort is normal       Breath sounds: Normal breath sounds  Skin:     General: Skin is warm and dry  Comments: Erythema and swelling noted around lateral and proximal nail fold of right index finger  Advised to keep area open to air and warm soaks   Neurological:      Mental Status: She is alert and oriented to person, place, and time  Psychiatric:         Mood and Affect: Mood normal          Behavior: Behavior normal          Thought Content:  Thought content normal          Judgment: Judgment normal                      Jensen Moon

## 2022-10-29 DIAGNOSIS — G43.109 MIGRAINE WITH AURA AND WITHOUT STATUS MIGRAINOSUS, NOT INTRACTABLE: ICD-10-CM

## 2022-11-01 DIAGNOSIS — G43.109 MIGRAINE WITH AURA AND WITHOUT STATUS MIGRAINOSUS, NOT INTRACTABLE: ICD-10-CM

## 2022-11-01 RX ORDER — SUMATRIPTAN 100 MG/1
100 TABLET, FILM COATED ORAL ONCE AS NEEDED
Qty: 15 TABLET | Refills: 0 | Status: SHIPPED | OUTPATIENT
Start: 2022-11-01 | End: 2022-11-01 | Stop reason: SDUPTHER

## 2022-11-02 RX ORDER — SUMATRIPTAN 100 MG/1
100 TABLET, FILM COATED ORAL ONCE AS NEEDED
Qty: 15 TABLET | Refills: 0 | Status: SHIPPED | OUTPATIENT
Start: 2022-11-02

## 2022-12-09 ENCOUNTER — TELEPHONE (OUTPATIENT)
Dept: FAMILY MEDICINE CLINIC | Facility: CLINIC | Age: 47
End: 2022-12-09

## 2022-12-09 DIAGNOSIS — Z13.6 SCREENING FOR CARDIOVASCULAR CONDITION: Primary | ICD-10-CM

## 2022-12-09 NOTE — TELEPHONE ENCOUNTER
Priscilla Johnson needs an order for routine bloodwork  She will schedule her physical when she gets bloodwork done she stated    [facility]

## 2022-12-15 DIAGNOSIS — G43.109 MIGRAINE WITH AURA AND WITHOUT STATUS MIGRAINOSUS, NOT INTRACTABLE: ICD-10-CM

## 2022-12-15 RX ORDER — SUMATRIPTAN 100 MG/1
100 TABLET, FILM COATED ORAL ONCE AS NEEDED
Qty: 15 TABLET | Refills: 0 | Status: SHIPPED | OUTPATIENT
Start: 2022-12-15

## 2023-01-13 ENCOUNTER — OFFICE VISIT (OUTPATIENT)
Dept: FAMILY MEDICINE CLINIC | Facility: CLINIC | Age: 48
End: 2023-01-13

## 2023-01-13 VITALS
SYSTOLIC BLOOD PRESSURE: 132 MMHG | BODY MASS INDEX: 27.19 KG/M2 | WEIGHT: 144 LBS | DIASTOLIC BLOOD PRESSURE: 70 MMHG | HEIGHT: 61 IN | HEART RATE: 90 BPM | OXYGEN SATURATION: 99 % | TEMPERATURE: 98 F | RESPIRATION RATE: 18 BRPM

## 2023-01-13 DIAGNOSIS — F41.8 SITUATIONAL ANXIETY: Primary | ICD-10-CM

## 2023-01-13 DIAGNOSIS — G40.209 LOCALIZATION-RELATED (FOCAL) (PARTIAL) SYMPTOMATIC EPILEPSY AND EPILEPTIC SYNDROMES WITH COMPLEX PARTIAL SEIZURES, NOT INTRACTABLE, WITHOUT STATUS EPILEPTICUS (HCC): ICD-10-CM

## 2023-01-13 DIAGNOSIS — Z23 NEED FOR VACCINATION: ICD-10-CM

## 2023-01-13 NOTE — PROGRESS NOTES
Assessment/Plan:    1  Situational anxiety    2  Localization-related (focal) (partial) symptomatic epilepsy and epileptic syndromes with complex partial seizures, not intractable, without status epilepticus ([facility] )  Assessment & Plan:  Controlled on her meds      3  Need for vaccination  -     influenza vaccine, quadrivalent, 0 5 mL, preservative-free      BMI Counseling: Body mass index is 27 21 kg/m²  The BMI is above normal  Nutrition recommendations include decreasing portion sizes  Exercise recommendations include moderate physical activity 150 minutes/week  No pharmacotherapy was ordered  Rationale for BMI follow-up plan is due to patient being overweight or obese  Depression Screening and Follow-up Plan: Patient was screened for depression during today's encounter  They screened negative with a PHQ-2 score of 1  There are no Patient Instructions on file for this visit  Return for Recheck  Subjective:      Patient ID: Memo Capps is a 52 y o  female  Chief Complaint   Patient presents with   • Follow-up     Anxiety and family issues  rmklpn       Pt is here for anxiety  Pt's daughter recently attempted suicide  Sleeping pills  Was in hospital  Then admitted  Then home in a two day a week out pt program    Pt is always worried  Has to stay at home with her  Supervisor suggested pt come here to have the issues documented  Pt states she has a lot of anxiety right now - feels its situational  Pt does not want medication for  Pt had a counselor a long time ago - reached out to her - and she had a phone session but nothing is set up on a regular basis at this time        The following portions of the patient's history were reviewed and updated as appropriate: allergies, current medications, past family history, past medical history, past social history, past surgical history and problem list     Review of Systems   Psychiatric/Behavioral: Positive for dysphoric mood   The patient is nervous/anxious  Current Outpatient Medications   Medication Sig Dispense Refill   • atorvastatin (LIPITOR) 10 mg tablet Take 1 tablet (10 mg total) by mouth daily 90 tablet 0   • carBAMazepine (CARBATROL) 300 MG 12 hr capsule 300 mg every 12 (twelve) hours   1   • ibuprofen (MOTRIN) 200 mg tablet Take 200 mg by mouth every 6 (six) hours as needed for mild pain      • SUMAtriptan (IMITREX) 100 mg tablet Take 1 tablet (100 mg total) by mouth once as needed for migraine for up to 1 dose 15 tablet 0   • valACYclovir (VALTREX) 500 mg tablet 500 mg daily after dinner   2     No current facility-administered medications for this visit  Objective:    /70   Pulse 90   Temp 98 °F (36 7 °C)   Resp 18   Ht 5' 1" (1 549 m)   Wt 65 3 kg (144 lb)   LMP 01/13/2023 (Exact Date)   SpO2 99%   BMI 27 21 kg/m²        Physical Exam  Vitals reviewed  Constitutional:       General: She is not in acute distress  Appearance: She is well-developed  She is not diaphoretic  HENT:      Head: Normocephalic and atraumatic  Eyes:      General:         Right eye: No discharge  Left eye: No discharge  Conjunctiva/sclera: Conjunctivae normal    Pulmonary:      Effort: Pulmonary effort is normal  No respiratory distress  Musculoskeletal:      Cervical back: Normal range of motion                  Romy Castrejon DO

## 2023-01-19 DIAGNOSIS — G43.109 MIGRAINE WITH AURA AND WITHOUT STATUS MIGRAINOSUS, NOT INTRACTABLE: ICD-10-CM

## 2023-01-19 RX ORDER — SUMATRIPTAN 100 MG/1
100 TABLET, FILM COATED ORAL ONCE AS NEEDED
Qty: 15 TABLET | Refills: 0 | Status: SHIPPED | OUTPATIENT
Start: 2023-01-19

## 2023-02-15 DIAGNOSIS — G43.109 MIGRAINE WITH AURA AND WITHOUT STATUS MIGRAINOSUS, NOT INTRACTABLE: ICD-10-CM

## 2023-02-16 RX ORDER — SUMATRIPTAN 100 MG/1
100 TABLET, FILM COATED ORAL ONCE AS NEEDED
Qty: 15 TABLET | Refills: 0 | Status: SHIPPED | OUTPATIENT
Start: 2023-02-16

## 2023-03-25 DIAGNOSIS — G43.109 MIGRAINE WITH AURA AND WITHOUT STATUS MIGRAINOSUS, NOT INTRACTABLE: ICD-10-CM

## 2023-03-27 RX ORDER — SUMATRIPTAN 100 MG/1
100 TABLET, FILM COATED ORAL ONCE AS NEEDED
Qty: 15 TABLET | Refills: 0 | Status: SHIPPED | OUTPATIENT
Start: 2023-03-27

## 2023-04-10 LAB — HBA1C MFR BLD HPLC: 5.3 %

## 2023-04-25 ENCOUNTER — OFFICE VISIT (OUTPATIENT)
Dept: FAMILY MEDICINE CLINIC | Facility: CLINIC | Age: 48
End: 2023-04-25

## 2023-04-25 VITALS
WEIGHT: 146 LBS | DIASTOLIC BLOOD PRESSURE: 84 MMHG | HEIGHT: 61 IN | TEMPERATURE: 98.8 F | BODY MASS INDEX: 27.56 KG/M2 | RESPIRATION RATE: 17 BRPM | SYSTOLIC BLOOD PRESSURE: 124 MMHG | HEART RATE: 76 BPM

## 2023-04-25 DIAGNOSIS — Z12.31 SCREENING MAMMOGRAM FOR HIGH-RISK PATIENT: ICD-10-CM

## 2023-04-25 DIAGNOSIS — N60.19 FIBROCYSTIC BREAST DISEASE (FCBD), UNSPECIFIED LATERALITY: ICD-10-CM

## 2023-04-25 DIAGNOSIS — G43.109 MIGRAINE WITH AURA AND WITHOUT STATUS MIGRAINOSUS, NOT INTRACTABLE: ICD-10-CM

## 2023-04-25 DIAGNOSIS — E78.2 HYPERLIPEMIA, MIXED: ICD-10-CM

## 2023-04-25 DIAGNOSIS — Z00.00 WELL ADULT EXAM: Primary | ICD-10-CM

## 2023-04-25 RX ORDER — SUMATRIPTAN 100 MG/1
100 TABLET, FILM COATED ORAL ONCE AS NEEDED
Qty: 15 TABLET | Refills: 3 | Status: SHIPPED | OUTPATIENT
Start: 2023-04-25

## 2023-04-25 RX ORDER — ATORVASTATIN CALCIUM 20 MG/1
20 TABLET, FILM COATED ORAL DAILY
Qty: 90 TABLET | Refills: 1 | Status: SHIPPED | OUTPATIENT
Start: 2023-04-25

## 2023-04-25 NOTE — PROGRESS NOTES
FAMILY PRACTICE HEALTH MAINTENANCE OFFICE VISIT  [facility]    NAME: Elian Montes  AGE: 52 y o  SEX: female  : 1975     DATE: 2023    Assessment and Plan     1  Well adult exam    2  Screening mammogram for high-risk patient  -     Mammo screening bilateral w 3d & cad; Future; Expected date: 2023    3  Fibrocystic breast disease (FCBD), unspecified laterality  -     US breast left limited (diagnostic); Future; Expected date: 2023  -     US breast right limited (diagnostic); Future; Expected date: 2023    4  Hyperlipemia, mixed  Comments:  counseled on diet/exercise modifications  Orders:  -     atorvastatin (LIPITOR) 20 mg tablet; Take 1 tablet (20 mg total) by mouth daily  -     Comprehensive metabolic panel; Future; Expected date: 2023  -     Lipid Panel with Direct LDL reflex; Future; Expected date: 2023    5  Migraine with aura and without status migrainosus, not intractable  -     SUMAtriptan (IMITREX) 100 mg tablet; Take 1 tablet (100 mg total) by mouth once as needed for migraine for up to 1 dose      Patient Counseling:   Nutrition: Stressed importance of a well balanced diet, moderation of sodium/saturated fat, caloric balance and sufficient intake of fiber  Exercise: Stressed the importance of regular exercise with a goal of 150 minutes per week  Dental Health: Discussed daily flossing and brushing and regular dental visits     Immunizations reviewed: Up To Date  Discussed benefits of:  Colon Cancer Screening, Mammogram , Cervical Cancer screening and Screening labs  BMI Counseling: Body mass index is 27 59 kg/m²  Discussed with patient's BMI with her  The BMI is above normal  Nutrition recommendations include reducing portion sizes  Return in about 3 months (around 2023) for Recheck           Chief Complaint     Chief Complaint   Patient presents with   • Annual Exam     Nm lpn       History of Present Illness Pt is here for a full physical is stikll on the steroids for the sinus infection but feeling better    Pt is requesting an US of her breast  Pt reports nipple discharge - states she did discuss this with her OBGYN - pt states she ordered labs - and she states they did not come back concerned          Well Adult Physical   Patient here for a comprehensive physical exam       Diet and Physical Activity  Diet: well balanced diet  Exercise: frequently      Depression Screen  PHQ-2/9 Depression Screening            General Health  Hearing: Normal:  bilateral  Vision: wears glasses  Dental: regular dental visits    Reproductive Health  No issues       The following portions of the patient's history were reviewed and updated as appropriate: allergies, current medications, past family history, past medical history, past social history, past surgical history and problem list     Review of Systems     Review of Systems   Constitutional: Negative  Negative for activity change, appetite change, chills, diaphoresis and fatigue  HENT: Negative  Negative for dental problem, ear pain, sinus pressure and sore throat  Eyes: Negative  Negative for photophobia, pain, discharge, redness, itching and visual disturbance  Respiratory: Negative for apnea and chest tightness  Cardiovascular: Negative  Negative for chest pain, palpitations and leg swelling  Gastrointestinal: Negative  Negative for abdominal distention, abdominal pain, constipation and diarrhea  Endocrine: Negative  Negative for cold intolerance and heat intolerance  Genitourinary: Negative  Negative for difficulty urinating and dyspareunia  Musculoskeletal: Negative  Negative for arthralgias and back pain  Skin: Negative  Allergic/Immunologic: Negative for environmental allergies  Neurological: Negative  Negative for dizziness  Psychiatric/Behavioral: Negative  Negative for agitation         Past Medical History     Past Medical History:   Diagnosis Date   • Cervical radiculopathy     RESOLVED: [id number] buldging disc   • Kidney stone    • Seizure [facility])     age 25       Past Surgical History     Past Surgical History:   Procedure Laterality Date   • ADENOIDECTOMY     • COLONOSCOPY     • LITHOTRIPSY  2006    RENAL   • OVARIAN CYST REMOVAL  2012   • SC HYSTEROSCOPY BX ENDOMETRIUM&/POLYPC W/WO D&C N/A 10/28/2019    Procedure: OPERATIVE HYSTEROSCOPY (MYOSURE),POLYPECTOMY,D AND C;  Surgeon: Staci Chauhan MD;  Location: [facility];  Service: Gynecology       Social History     Social History     Socioeconomic History   • Marital status: /Civil Union     Spouse name: None   • Number of children: None   • Years of education: None   • Highest education level: None   Occupational History   • None   Tobacco Use   • Smoking status: Former     Packs/day: 1 00     Years: 20 00     Pack years: 20 00     Types: Cigarettes     Start date: 6/15/1992     Quit date: 5/6/2007     Years since quitting: 15 9   • Smokeless tobacco: Never   Vaping Use   • Vaping Use: Never used   Substance and Sexual Activity   • Alcohol use: Yes     Comment: rare   • Drug use: Never   • Sexual activity: None   Other Topics Concern   • None   Social History Narrative    Daily coffee consumption    Daily tea consumption     as per all scripts    Lack of exercise    Sleeps 6-7 hours a day      Social Determinants of Health     Financial Resource Strain: Not on file   Food Insecurity: Not on file   Transportation Needs: Not on file   Physical Activity: Not on file   Stress: Not on file   Social Connections: Not on file   Intimate Partner Violence: Not on file   Housing Stability: Not on file       Family History     Family History   Problem Relation Age of Onset   • Multiple sclerosis Mother    • Colon cancer Maternal Grandmother 79   • Rheum arthritis Maternal Grandmother    • Arthritis Family    • Colon cancer Family    • Osteoporosis Family    • Anxiety disorder "Father    • Chromosomal disorder Cousin    • No Known Problems Sister    • No Known Problems Daughter    • Skin cancer Maternal Grandfather 72        not sure of exact age  • No Known Problems Paternal Grandmother    • No Known Problems Paternal Grandfather    • No Known Problems Daughter    • No Known Problems Son    • No Known Problems Maternal Aunt    • No Known Problems Maternal Aunt    • No Known Problems Paternal Aunt        Current Medications       Current Outpatient Medications:   •  Aimovig 70 MG/ML SOAJ, ADMINISTER 1 ML UNDER THE SKIN EVERY 4 WEEKS, Disp: , Rfl:   •  amoxicillin-clavulanate (Augmentin) 875-125 mg per tablet, Take 1 tablet by mouth every 12 (twelve) hours for 10 days, Disp: 20 tablet, Rfl: 0  •  atorvastatin (LIPITOR) 20 mg tablet, Take 1 tablet (20 mg total) by mouth daily, Disp: 90 tablet, Rfl: 1  •  carBAMazepine (CARBATROL) 300 MG 12 hr capsule, 300 mg every 12 (twelve) hours , Disp: , Rfl: 1  •  ibuprofen (MOTRIN) 200 mg tablet, Take 200 mg by mouth every 6 (six) hours as needed for mild pain , Disp: , Rfl:   •  methylPREDNISolone 4 MG tablet therapy pack, Use as directed on package, Disp: 21 each, Rfl: 0  •  SUMAtriptan (IMITREX) 100 mg tablet, Take 1 tablet (100 mg total) by mouth once as needed for migraine for up to 1 dose, Disp: 15 tablet, Rfl: 3  •  valACYclovir (VALTREX) 500 mg tablet, 500 mg daily after dinner As needed, Disp: , Rfl: 2     Allergies     Allergies   Allergen Reactions   • Ciprofloxacin Lightheadedness     dizzy   • Phenytoin Rash     Reaction Date: 05Oct2010; Objective     /84   Pulse 76   Temp 98 8 °F (37 1 °C)   Resp 17   Ht 5' 1\" (1 549 m)   Wt 66 2 kg (146 lb)   LMP 04/03/2023 (Approximate)   BMI 27 59 kg/m²      Physical Exam  Vitals and nursing note reviewed  Constitutional:       General: She is not in acute distress  Appearance: She is well-developed  She is not diaphoretic  HENT:      Head: Normocephalic and atraumatic      " Right Ear: External ear normal       Left Ear: External ear normal       Nose: Nose normal       Mouth/Throat:      Pharynx: No oropharyngeal exudate  Eyes:      General: No scleral icterus  Right eye: No discharge  Left eye: No discharge  Pupils: Pupils are equal, round, and reactive to light  Neck:      Thyroid: No thyromegaly  Cardiovascular:      Rate and Rhythm: Normal rate  Heart sounds: Normal heart sounds  No murmur heard  Pulmonary:      Effort: Pulmonary effort is normal  No respiratory distress  Breath sounds: Normal breath sounds  No wheezing  Abdominal:      General: Bowel sounds are normal  There is no distension  Palpations: Abdomen is soft  There is no mass  Tenderness: There is no abdominal tenderness  There is no guarding or rebound  Musculoskeletal:         General: Normal range of motion  Skin:     General: Skin is warm and dry  Findings: No erythema or rash  Neurological:      Mental Status: She is alert        Coordination: Coordination normal       Deep Tendon Reflexes: Reflexes normal    Psychiatric:         Behavior: Behavior normal            Vision Screening    Right eye Left eye Both eyes   Without correction      With correction 20/20 20/20 20/20     Recent Results (from the past 672 hour(s))   Hemoglobin A1C    Collection Time: 04/10/23 12:00 AM   Result Value Ref Range    Hemoglobin A1C 5 3    Ambig Abbrev Default    Collection Time: 04/10/23 10:10 AM   Result Value Ref Range    White Blood Cell Count 5 9 3 4 - 10 8 x10E3/uL    Red Blood Cell Count 4 36 3 77 - 5 28 x10E6/uL    Hemoglobin 13 7 11 1 - 15 9 g/dL    HCT 40 3 34 0 - 46 6 %    MCV 92 79 - 97 fL    MCH 31 4 26 6 - 33 0 pg    MCHC 34 0 31 5 - 35 7 g/dL    RDW 11 9 11 7 - 15 4 %    Platelet Count 986 169 - 450 x10E3/uL    Neutrophils 48 Not Estab  %    Lymphocytes 41 Not Estab  %    Monocytes 8 Not Estab  %    Eosinophils 1 Not Estab  %    Basophils PCT 2 Not Estab  % Neutrophils (Absolute) 2 8 1 4 - 7 0 x10E3/uL    Lymphocytes (Absolute) 2 5 0 7 - 3 1 x10E3/uL    Monocytes (Absolute) 0 5 0 1 - 0 9 x10E3/uL    Eosinophils (Absolute) 0 1 0 0 - 0 4 x10E3/uL    Basophils ABS 0 1 0 0 - 0 2 x10E3/uL    Immature Granulocytes 0 Not Estab  %    Immature Granulocytes (Absolute) 0 0 0 0 - 0 1 x10E3/uL   Comprehensive metabolic panel    Collection Time: 04/10/23 10:10 AM   Result Value Ref Range    Glucose, Random 91 70 - 99 mg/dL    BUN 13 6 - 24 mg/dL    Creatinine 0 76 0 57 - 1 00 mg/dL    eGFR 97 >59 mL/min/1 73    SL AMB BUN/CREATININE RATIO 17 9 - 23    Sodium 139 134 - 144 mmol/L    Potassium 4 7 3 5 - 5 2 mmol/L    Chloride 103 96 - 106 mmol/L    CO2 26 20 - 29 mmol/L    CALCIUM 8 9 8 7 - 10 2 mg/dL    Protein, Total 6 5 6 0 - 8 5 g/dL    Albumin 4 4 3 8 - 4 8 g/dL    Globulin, Total 2 1 1 5 - 4 5 g/dL    Albumin/Globulin Ratio 2 1 1 2 - 2 2    TOTAL BILIRUBIN <0 2 0 0 - 1 2 mg/dL    Alk Phos Isoenzymes 97 44 - 121 IU/L    AST 24 0 - 40 IU/L    ALT 22 0 - 32 IU/L   Lipid panel    Collection Time: 04/10/23 10:10 AM   Result Value Ref Range    Cholesterol, Total 224 (H) 100 - 199 mg/dL    Triglycerides 67 0 - 149 mg/dL    HDL 93 >39 mg/dL    LDL Calculated 119 (H) 0 - 99 mg/dL   Cardiovascular Report    Collection Time: 04/10/23 10:10 AM   Result Value Ref Range    Interpretation Note            Benny Apgar, DO [facility]

## 2023-04-28 ENCOUNTER — TELEPHONE (OUTPATIENT)
Dept: ADMINISTRATIVE | Facility: HOSPITAL | Age: 48
End: 2023-04-28

## 2023-04-28 DIAGNOSIS — N64.52 NIPPLE DISCHARGE: Primary | ICD-10-CM

## 2023-04-28 NOTE — TELEPHONE ENCOUNTER
Dr Hiram Cuba called to schedule mammogram  Patient stated that she is having left Nipple discharge, discomfort and some tenderness  Patient will need Diagnostic mammogram ordered  Clerical to call patient when order is ready  She has the diagnostic mammo phone number already and will call to schedule when ordered  Thank you!

## 2023-05-02 ENCOUNTER — OFFICE VISIT (OUTPATIENT)
Dept: FAMILY MEDICINE CLINIC | Facility: CLINIC | Age: 48
End: 2023-05-02

## 2023-05-02 ENCOUNTER — NURSE TRIAGE (OUTPATIENT)
Dept: OTHER | Facility: OTHER | Age: 48
End: 2023-05-02

## 2023-05-02 VITALS
HEIGHT: 61 IN | RESPIRATION RATE: 17 BRPM | SYSTOLIC BLOOD PRESSURE: 128 MMHG | HEART RATE: 90 BPM | TEMPERATURE: 98.7 F | WEIGHT: 148 LBS | DIASTOLIC BLOOD PRESSURE: 76 MMHG | BODY MASS INDEX: 27.94 KG/M2

## 2023-05-02 DIAGNOSIS — G43.109 MIGRAINE WITH AURA AND WITHOUT STATUS MIGRAINOSUS, NOT INTRACTABLE: ICD-10-CM

## 2023-05-02 DIAGNOSIS — R21 RASH: Primary | ICD-10-CM

## 2023-05-02 DIAGNOSIS — G40.219 SEIZURE DISORDER, TEMPORAL LOBE, INTRACTABLE (HCC): ICD-10-CM

## 2023-05-02 DIAGNOSIS — E78.2 HYPERLIPEMIA, MIXED: ICD-10-CM

## 2023-05-02 NOTE — TELEPHONE ENCOUNTER
"Pt c/o red, raised rash that started at umbilicus and spread around her waistline  She noticed it this morning  It is itchy  Pt took Benadryl at 0530  She is wondering if rash could be from Aimovig injection she gave herself last night at 1130  Injection was given in the arm and pt has used the medication previously  Appt made for today for evaluation  Reason for Disposition   [1] Looks infected (spreading redness, pus) AND [2] no fever    Answer Assessment - Initial Assessment Questions  1  APPEARANCE of RASH: \"Describe the rash  \"       Rash around waistband area of pants  2  LOCATION: \"Where is the rash located? \"      Waist   3  NUMBER: \"How many spots are there? \"       Red   4  SIZE: \"How big are the spots? \" (Inches, centimeters or compare to size of a coin)       Quarter size around umbilicus  5  ONSET: \"When did the rash start? \"       today  6  ITCHING: \"Does the rash itch? \" If Yes, ask: \"How bad is the itch? \"  (Scale 1-10; or mild, moderate, severe)      itching  7  PAIN: \"Does the rash hurt? \" If Yes, ask: \"How bad is the pain? \"  (Scale 1-10; or mild, moderate, severe)      no  8  OTHER SYMPTOMS: \"Do you have any other symptoms? \" (e g , fever)      no  9  PREGNANCY: \"Is there any chance you are pregnant? \" \"When was your last menstrual period? \"      no    Protocols used: RASH OR REDNESS - LOCALIZED-ADULT-AH    "

## 2023-05-02 NOTE — PATIENT INSTRUCTIONS
Supportive care discussed and advised  Advised to RTO for any worsening and no improvement  Follow up for no improvement and worsening of conditions  Patient advised and educated when to see immediate medical care  Acute Rash   WHAT YOU NEED TO KNOW:   A rash is irritated, red, or itchy skin or mucus membranes, such as the lining of your nose or throat  Acute means the rash starts suddenly, worsens quickly, and lasts a short time  Common causes include a disease or infection, a reaction to something you are allergic to, or certain medicines  DISCHARGE INSTRUCTIONS:   Return to the emergency department if:   You have sudden trouble breathing or chest pain  You are vomiting, have a headache or muscle aches, and your throat hurts  Call your doctor or dermatologist if:   You have a fever  You get open wounds from scratching your skin, or you have a wound that is red, swollen, or painful  Your rash lasts longer than 3 months  You have swelling or pain in your joints  You have questions or concerns about your condition or care  Medicines:  If your rash does not go away on its own, you may need the following medicines:  Antihistamines  may be given to help decrease itching  Steroids  may be given to decrease inflammation  Antibiotics  help fight or prevent a bacterial infection  Take your medicine as directed  Contact your healthcare provider if you think your medicine is not helping or if you have side effects  Tell your provider if you are allergic to any medicine  Keep a list of the medicines, vitamins, and herbs you take  Include the amounts, and when and why you take them  Bring the list or the pill bottles to follow-up visits  Carry your medicine list with you in case of an emergency  Prevent a rash or care for your skin when you have a rash:  Dry skin can lead to more problems  Do not scratch your skin if it itches  You may cause a skin infection by scratching   The following may prevent dry skin, and help your skin look better:  Help soothe your rash  Apply thick cream lotions or petroleum jelly  Cool compresses may also soothe your skin  Apply a cool compress or a cool, wet towel, and then cover it with a dry towel  Use lukewarm water when you bathe  Hot water may damage your skin more  Pat your skin dry  Do not rub your skin with a towel  Use detergents, soaps, shampoos, and bubble baths  made for sensitive skin  Wear clothes made of cotton instead of nylon or wool  Cotton is softer, so it will not hurt your skin as much  Follow up with your healthcare providers as directed:  A dermatologist may help find the cause of your rash or help plan or change treatment  A dietitian may help with meal planning if you have a food allergy  Write down your questions so you remember to ask them during your visits  © Copyright [employer] 2022 Information is for End User's use only and may not be sold, redistributed or otherwise used for commercial purposes  The above information is an  only  It is not intended as medical advice for individual conditions or treatments  Talk to your doctor, nurse or pharmacist before following any medical regimen to see if it is safe and effective for you

## 2023-05-02 NOTE — PROGRESS NOTES
"Assessment/Plan:  Discussed with patient that rash seems possibly from localized allergic reaction and very low risk of drug rash, will continue to monitor and will follow back or will go to ER for any worsening of rash, tongue swelling and throat closure feeling and sob  1  Rash  -     hydrocortisone 2 5 % cream; Apply topically 2 (two) times a day    2  Seizure disorder, temporal lobe, intractable ([address] )  Assessment & Plan: On carbamazepine      3  Migraine with aura and without status migrainosus, not intractable  Assessment & Plan: On Aimvog and stated that helping her      4  Hyperlipemia, mixed  Assessment & Plan:  Complaint with statin and tolerating it well            Patient Instructions:  Supportive care discussed and advised  Advised to RTO for any worsening and no improvement  Follow up for no improvement and worsening of conditions  Patient advised and educated when to see immediate medical care  Return if symptoms worsen or fail to improve  Future Appointments   Date Time Provider Ramiro Rico   6/12/2023  3:30 PM [facility] 3 BE RBC Mammo [facility]   7/25/2023  8:15 AM DO DARRICK Rosales [facility]           Subjective:      Patient ID: Kofi Rodrigez is a 52 y o  female  Chief Complaint   Patient presents with    Rash     Woke up with a rash on stomach, itchy  Started Aimovig 2 months ago and did shot last night nm lpn         Vitals:  /76   Pulse 90   Temp 98 7 °F (37 1 °C)   Resp 17   Ht 5' 1\" (1 549 m)   Wt 67 1 kg (148 lb)   LMP 04/03/2023 (Approximate)   BMI 27 96 kg/m²     HPI  Patient stated that using Jerilynn Passy from last 2 months and did inject last night and today morning woke up with rash around her waistline where she tied her pajama pants  Stated that rash is itchy and took benadryl and this morning was on her back too but now its at umbilical area and anterior waist line only  Denies usage of new detergents and body products    Complaint with " her medications for chronic illnesses  Currently on augmentin and today is last day and also recently finishes steroid pack for sinus infection and advised not to take any more augmentin                    The following portions of the patient's history were reviewed and updated as appropriate: allergies, current medications, past family history, past medical history, past social history, past surgical history and problem list       Review of Systems   HENT: Negative  Respiratory: Negative  Cardiovascular: Negative  Gastrointestinal: Negative  Genitourinary: Negative  Skin: Positive for rash  Neurological: Negative  Objective:    Social History     Tobacco Use   Smoking Status Former    Packs/day: 1     Years: 20     Pack years: 20     Types: Cigarettes    Start date: 6/15/1992   Debi Poisson Quit date: 2007    Years since quittin 0   Smokeless Tobacco Never       Allergies: Allergies   Allergen Reactions    Ciprofloxacin Lightheadedness     dizzy    Phenytoin Rash     Reaction Date: 2010;          Current Outpatient Medications   Medication Sig Dispense Refill    Aimovig 70 MG/ML SOAJ ADMINISTER 1 ML UNDER THE SKIN EVERY 4 WEEKS      atorvastatin (LIPITOR) 20 mg tablet Take 1 tablet (20 mg total) by mouth daily 90 tablet 1    carBAMazepine (CARBATROL) 300 MG 12 hr capsule 300 mg every 12 (twelve) hours   1    hydrocortisone 2 5 % cream Apply topically 2 (two) times a day 30 g 0    ibuprofen (MOTRIN) 200 mg tablet Take 200 mg by mouth every 6 (six) hours as needed for mild pain       SUMAtriptan (IMITREX) 100 mg tablet Take 1 tablet (100 mg total) by mouth once as needed for migraine for up to 1 dose 15 tablet 3    valACYclovir (VALTREX) 500 mg tablet 500 mg daily after dinner As needed  2     No current facility-administered medications for this visit  Physical Exam  Constitutional:       Appearance: Normal appearance     HENT:      Head: Normocephalic and atraumatic  Nose: Nose normal    Eyes:      Conjunctiva/sclera: Conjunctivae normal    Cardiovascular:      Rate and Rhythm: Normal rate and regular rhythm  Pulses: Normal pulses  Pulmonary:      Effort: Pulmonary effort is normal       Breath sounds: Normal breath sounds  Skin:     General: Skin is warm and dry  Findings: Rash (scattered pruritic macular rash noted on anterior waist line close to umbilical area without any vesicles and blisters) present  Neurological:      Mental Status: She is alert and oriented to person, place, and time  Psychiatric:         Mood and Affect: Mood normal          Behavior: Behavior normal          Thought Content:  Thought content normal          Judgment: Judgment normal                      GAGANDEEP Patel

## 2023-05-02 NOTE — TELEPHONE ENCOUNTER
"Regarding: Rash Around Stomach and Abdomen  ----- Message from Ruddy Jeffers sent at 5/2/2023  7:42 AM EDT -----  Ibeth Valdez woke up this morning with a rash around my abdomen and stomach area  It's very itchy and significantly red  I did receive my Aimovig shot 2 months ago and I am wondering if maybe it's just a side effect from having it? \"    "

## 2023-06-26 ENCOUNTER — OFFICE VISIT (OUTPATIENT)
Dept: FAMILY MEDICINE CLINIC | Facility: CLINIC | Age: 48
End: 2023-06-26
Payer: COMMERCIAL

## 2023-06-26 VITALS
DIASTOLIC BLOOD PRESSURE: 78 MMHG | BODY MASS INDEX: 26.81 KG/M2 | WEIGHT: 142 LBS | HEIGHT: 61 IN | RESPIRATION RATE: 16 BRPM | TEMPERATURE: 97.1 F | HEART RATE: 79 BPM | SYSTOLIC BLOOD PRESSURE: 122 MMHG

## 2023-06-26 DIAGNOSIS — N39.0 URINARY TRACT INFECTION WITHOUT HEMATURIA, SITE UNSPECIFIED: Primary | ICD-10-CM

## 2023-06-26 LAB
SL AMB  POCT GLUCOSE, UA: NEGATIVE
SL AMB LEUKOCYTE ESTERASE,UA: NEGATIVE
SL AMB POCT BILIRUBIN,UA: NEGATIVE
SL AMB POCT BLOOD,UA: NEGATIVE
SL AMB POCT CLARITY,UA: CLEAR
SL AMB POCT COLOR,UA: YELLOW
SL AMB POCT KETONES,UA: NEGATIVE
SL AMB POCT NITRITE,UA: NEGATIVE
SL AMB POCT PH,UA: 6.5
SL AMB POCT SPECIFIC GRAVITY,UA: 1.02
SL AMB POCT URINE PROTEIN: NEGATIVE
SL AMB POCT UROBILINOGEN: 0.2

## 2023-06-26 PROCEDURE — 81003 URINALYSIS AUTO W/O SCOPE: CPT | Performed by: FAMILY MEDICINE

## 2023-06-26 PROCEDURE — 99213 OFFICE O/P EST LOW 20 MIN: CPT | Performed by: FAMILY MEDICINE

## 2023-06-26 RX ORDER — SULFAMETHOXAZOLE AND TRIMETHOPRIM 800; 160 MG/1; MG/1
1 TABLET ORAL EVERY 12 HOURS SCHEDULED
Qty: 6 TABLET | Refills: 0 | Status: SHIPPED | OUTPATIENT
Start: 2023-06-26 | End: 2023-06-29

## 2023-06-26 RX ORDER — FLUCONAZOLE 200 MG/1
TABLET ORAL
COMMUNITY
Start: 2023-06-25

## 2023-06-26 NOTE — PROGRESS NOTES
Assessment/Plan:    1  Urinary tract infection without hematuria, site unspecified  -     POCT urine dip auto non-scope  -     Urine culture  -     sulfamethoxazole-trimethoprim (BACTRIM DS) 800-160 mg per tablet; Take 1 tablet by mouth every 12 (twelve) hours for 3 days    Will send for culture        Pt had her follow up mammo - states done at [facility] - was clear - she will be following with OBGYN and taking results from there going forward    There are no Patient Instructions on file for this visit  Return for Next scheduled follow up  Subjective:      Patient ID: Danni Jarquin is a 52 y o  female  Chief Complaint   Patient presents with   • Urinary Tract Infection     Feelings have to go after just went, feeling uncomfortable lw cma       Pt is here for poss UTI  PT states over the weekend she gina like she was having urgency and was able to go  Had decreased urine when she went  No particular bunring  No vaginal discharge        The following portions of the patient's history were reviewed and updated as appropriate: allergies, current medications, past family history, past medical history, past social history, past surgical history and problem list     Review of Systems   Constitutional: Negative  Negative for activity change, appetite change, chills, diaphoresis and fatigue  HENT: Negative  Negative for dental problem, ear pain, sinus pressure and sore throat  Eyes: Negative  Negative for photophobia, pain, discharge, redness, itching and visual disturbance  Respiratory: Negative for apnea and chest tightness  Cardiovascular: Negative  Negative for chest pain, palpitations and leg swelling  Gastrointestinal: Negative  Negative for abdominal distention, abdominal pain, constipation and diarrhea  Endocrine: Negative  Negative for cold intolerance and heat intolerance  Genitourinary: Negative  Negative for difficulty urinating and dyspareunia          Urgency   Musculoskeletal: "Negative  Negative for arthralgias and back pain  Skin: Negative  Allergic/Immunologic: Negative for environmental allergies  Neurological: Negative  Negative for dizziness  Psychiatric/Behavioral: Negative  Negative for agitation  Current Outpatient Medications   Medication Sig Dispense Refill   • Aimovig 70 MG/ML SOAJ ADMINISTER 1 ML UNDER THE SKIN EVERY 4 WEEKS     • atorvastatin (LIPITOR) 20 mg tablet Take 1 tablet (20 mg total) by mouth daily 90 tablet 1   • carBAMazepine (CARBATROL) 300 MG 12 hr capsule 300 mg every 12 (twelve) hours   1   • hydrocortisone 2 5 % cream Apply topically 2 (two) times a day 30 g 0   • ibuprofen (MOTRIN) 200 mg tablet Take 200 mg by mouth every 6 (six) hours as needed for mild pain      • sulfamethoxazole-trimethoprim (BACTRIM DS) 800-160 mg per tablet Take 1 tablet by mouth every 12 (twelve) hours for 3 days 6 tablet 0   • SUMAtriptan (IMITREX) 100 mg tablet Take 1 tablet (100 mg total) by mouth once as needed for migraine for up to 1 dose 15 tablet 3   • valACYclovir (VALTREX) 500 mg tablet 500 mg daily after dinner As needed  2   • fluconazole (DIFLUCAN) 200 mg tablet  (Patient not taking: Reported on 6/26/2023)       No current facility-administered medications for this visit  Objective:    /78   Pulse 79   Temp (!) 97 1 °F (36 2 °C) (Tympanic)   Resp 16   Ht 5' 1\" (1 549 m)   Wt 64 4 kg (142 lb)   LMP 04/25/2023 (Approximate)   BMI 26 83 kg/m²        Physical Exam  Vitals and nursing note reviewed  Constitutional:       General: She is not in acute distress  Appearance: She is well-developed  She is not diaphoretic  HENT:      Head: Normocephalic and atraumatic  Right Ear: External ear normal       Left Ear: External ear normal       Nose: Nose normal       Mouth/Throat:      Pharynx: No oropharyngeal exudate  Eyes:      General: No scleral icterus  Right eye: No discharge  Left eye: No discharge        " Pupils: Pupils are equal, round, and reactive to light  Neck:      Thyroid: No thyromegaly  Cardiovascular:      Rate and Rhythm: Normal rate  Heart sounds: Normal heart sounds  No murmur heard  Pulmonary:      Effort: Pulmonary effort is normal  No respiratory distress  Breath sounds: Normal breath sounds  No wheezing  Abdominal:      General: Bowel sounds are normal  There is no distension  Palpations: Abdomen is soft  There is no mass  Tenderness: There is no abdominal tenderness  There is no guarding or rebound  Comments: Minor lower abdominal tenderness   Musculoskeletal:         General: Normal range of motion  Skin:     General: Skin is warm and dry  Findings: No erythema or rash  Neurological:      Mental Status: She is alert        Coordination: Coordination normal       Deep Tendon Reflexes: Reflexes normal    Psychiatric:         Behavior: Behavior normal                 Patrick Richardson DO

## 2023-06-28 ENCOUNTER — TELEPHONE (OUTPATIENT)
Dept: FAMILY MEDICINE CLINIC | Facility: CLINIC | Age: 48
End: 2023-06-28

## 2023-06-28 LAB
BACTERIA UR CULT: NORMAL
Lab: NORMAL

## 2023-08-26 LAB
ALBUMIN SERPL-MCNC: 4.3 G/DL (ref 3.9–4.9)
ALBUMIN/GLOB SERPL: 2.4 {RATIO} (ref 1.2–2.2)
ALP SERPL-CCNC: 92 IU/L (ref 44–121)
ALT SERPL-CCNC: 16 IU/L (ref 0–32)
AST SERPL-CCNC: 19 IU/L (ref 0–40)
BILIRUB SERPL-MCNC: <0.2 MG/DL (ref 0–1.2)
BUN SERPL-MCNC: 13 MG/DL (ref 6–24)
BUN/CREAT SERPL: 19 (ref 9–23)
CALCIUM SERPL-MCNC: 9 MG/DL (ref 8.7–10.2)
CHLORIDE SERPL-SCNC: 103 MMOL/L (ref 96–106)
CHOLEST SERPL-MCNC: 201 MG/DL (ref 100–199)
CO2 SERPL-SCNC: 25 MMOL/L (ref 20–29)
CREAT SERPL-MCNC: 0.69 MG/DL (ref 0.57–1)
EGFR: 108 ML/MIN/1.73
GLOBULIN SER-MCNC: 1.8 G/DL (ref 1.5–4.5)
GLUCOSE SERPL-MCNC: 90 MG/DL (ref 70–99)
HDLC SERPL-MCNC: 80 MG/DL
LDLC SERPL CALC-MCNC: 110 MG/DL (ref 0–99)
MICRODELETION SYND BLD/T FISH: NORMAL
POTASSIUM SERPL-SCNC: 4.7 MMOL/L (ref 3.5–5.2)
PROT SERPL-MCNC: 6.1 G/DL (ref 6–8.5)
SODIUM SERPL-SCNC: 142 MMOL/L (ref 134–144)
TRIGL SERPL-MCNC: 60 MG/DL (ref 0–149)

## 2023-09-08 ENCOUNTER — OFFICE VISIT (OUTPATIENT)
Dept: FAMILY MEDICINE CLINIC | Facility: CLINIC | Age: 48
End: 2023-09-08
Payer: COMMERCIAL

## 2023-09-08 VITALS
SYSTOLIC BLOOD PRESSURE: 124 MMHG | HEART RATE: 82 BPM | HEIGHT: 61 IN | BODY MASS INDEX: 26.09 KG/M2 | OXYGEN SATURATION: 99 % | DIASTOLIC BLOOD PRESSURE: 60 MMHG | RESPIRATION RATE: 16 BRPM | TEMPERATURE: 97.6 F | WEIGHT: 138.2 LBS

## 2023-09-08 DIAGNOSIS — M79.672 FOOT PAIN, LEFT: ICD-10-CM

## 2023-09-08 DIAGNOSIS — E78.2 HYPERLIPEMIA, MIXED: Primary | ICD-10-CM

## 2023-09-08 PROCEDURE — 99214 OFFICE O/P EST MOD 30 MIN: CPT | Performed by: FAMILY MEDICINE

## 2023-09-08 RX ORDER — ROSUVASTATIN CALCIUM 20 MG/1
20 TABLET, COATED ORAL DAILY
Qty: 90 TABLET | Refills: 1 | Status: SHIPPED | OUTPATIENT
Start: 2023-09-08

## 2023-09-08 RX ORDER — ERENUMAB-AOOE 140 MG/ML
INJECTION, SOLUTION SUBCUTANEOUS
COMMUNITY
Start: 2023-08-24

## 2023-09-08 NOTE — PROGRESS NOTES
Assessment/Plan:    1. Hyperlipemia, mixed  -     rosuvastatin (CRESTOR) 20 MG tablet; Take 1 tablet (20 mg total) by mouth daily  -     Comprehensive metabolic panel; Future; Expected date: 11/22/2023  -     Lipid Panel with Direct LDL reflex; Future; Expected date: 11/22/2023    2. Foot pain, left  -     XR foot 3+ vw left; Future; Expected date: 09/08/2023  -     Ambulatory referral to Podiatry; Future            There are no Patient Instructions on file for this visit. Return in about 6 months (around 3/8/2024) for Recheck. Subjective:      Patient ID: Leandra Ochoa is a 52 y.o. female. Chief Complaint   Patient presents with   • Follow-up   • review lab results     ROBINSON Russo/DALJIT       Pt is here for a follow up of labs    Pt states while she is here she has pain in her left foot for weeks. PT states she thinks she has a ball on the left foot in the area    Pt states she is having issues in the sun - states she gets a rash. Not getting sunburn - thinks its a reaction. Did nopt have this problem last year - states the only thing that chnaged is that she went up on her statin      The following portions of the patient's history were reviewed and updated as appropriate: allergies, current medications, past family history, past medical history, past social history, past surgical history and problem list.    Review of Systems   Constitutional: Negative. Negative for activity change, appetite change, chills, diaphoresis and fatigue. HENT: Negative. Negative for dental problem, ear pain, sinus pressure and sore throat. Eyes: Negative. Negative for photophobia, pain, discharge, redness, itching and visual disturbance. Respiratory: Negative for apnea and chest tightness. Cardiovascular: Negative. Negative for chest pain, palpitations and leg swelling. Gastrointestinal: Negative. Negative for abdominal distention, abdominal pain, constipation and diarrhea. Endocrine: Negative.   Negative for cold intolerance and heat intolerance. Genitourinary: Negative. Negative for difficulty urinating and dyspareunia. Musculoskeletal: Positive for arthralgias. Negative for back pain. Skin: Negative. Allergic/Immunologic: Negative for environmental allergies. Neurological: Negative. Negative for dizziness. Psychiatric/Behavioral: Negative. Negative for agitation. Current Outpatient Medications   Medication Sig Dispense Refill   • Aimovig 140 MG/ML SOAJ ADMINISTER 1 ML UNDER THE SKIN EVERY 4 WEEKS     • atorvastatin (LIPITOR) 20 mg tablet Take 1 tablet (20 mg total) by mouth daily 90 tablet 1   • carBAMazepine (CARBATROL) 300 MG 12 hr capsule 300 mg every 12 (twelve) hours   1   • ibuprofen (MOTRIN) 200 mg tablet Take 200 mg by mouth every 6 (six) hours as needed for mild pain      • rosuvastatin (CRESTOR) 20 MG tablet Take 1 tablet (20 mg total) by mouth daily 90 tablet 1   • SUMAtriptan (IMITREX) 100 mg tablet Take 1 tablet (100 mg total) by mouth once as needed for migraine for up to 1 dose 15 tablet 3   • valACYclovir (VALTREX) 500 mg tablet 500 mg daily after dinner As needed  2     No current facility-administered medications for this visit. Objective:    /60   Pulse 82   Temp 97.6 °F (36.4 °C) (Temporal)   Resp 16   Ht 5' 1" (1.549 m)   Wt 62.7 kg (138 lb 3.2 oz)   LMP 08/09/2023 (Approximate)   SpO2 99%   BMI 26.11 kg/m²        Physical Exam  Vitals and nursing note reviewed. Constitutional:       General: She is not in acute distress. Appearance: She is well-developed. She is not diaphoretic. HENT:      Head: Normocephalic and atraumatic. Right Ear: External ear normal.      Left Ear: External ear normal.      Nose: Nose normal.      Mouth/Throat:      Pharynx: No oropharyngeal exudate. Eyes:      General: No scleral icterus. Right eye: No discharge. Left eye: No discharge. Pupils: Pupils are equal, round, and reactive to light.    Neck: Thyroid: No thyromegaly. Cardiovascular:      Rate and Rhythm: Normal rate. Heart sounds: Normal heart sounds. No murmur heard. Pulmonary:      Effort: Pulmonary effort is normal. No respiratory distress. Breath sounds: Normal breath sounds. No wheezing. Abdominal:      General: Bowel sounds are normal. There is no distension. Palpations: Abdomen is soft. There is no mass. Tenderness: There is no abdominal tenderness. There is no guarding or rebound. Musculoskeletal:         General: Normal range of motion. Skin:     General: Skin is warm and dry. Findings: No erythema or rash. Neurological:      Mental Status: She is alert.       Coordination: Coordination normal.      Deep Tendon Reflexes: Reflexes normal.   Psychiatric:         Behavior: Behavior normal.              Recent Results (from the past 672 hour(s))   Comprehensive metabolic panel    Collection Time: 08/25/23 10:35 AM   Result Value Ref Range    Glucose, Random 90 70 - 99 mg/dL    BUN 13 6 - 24 mg/dL    Creatinine 0.69 0.57 - 1.00 mg/dL    eGFR 108 >59 mL/min/1.73    SL AMB BUN/CREATININE RATIO 19 9 - 23    Sodium 142 134 - 144 mmol/L    Potassium 4.7 3.5 - 5.2 mmol/L    Chloride 103 96 - 106 mmol/L    CO2 25 20 - 29 mmol/L    CALCIUM 9.0 8.7 - 10.2 mg/dL    Protein, Total 6.1 6.0 - 8.5 g/dL    Albumin 4.3 3.9 - 4.9 g/dL    Globulin, Total 1.8 1.5 - 4.5 g/dL    Albumin/Globulin Ratio 2.4 (H) 1.2 - 2.2    TOTAL BILIRUBIN <0.2 0.0 - 1.2 mg/dL    Alk Phos Isoenzymes 92 44 - 121 IU/L    AST 19 0 - 40 IU/L    ALT 16 0 - 32 IU/L   Lipid panel    Collection Time: 08/25/23 10:35 AM   Result Value Ref Range    Cholesterol, Total 201 (H) 100 - 199 mg/dL    Triglycerides 60 0 - 149 mg/dL    HDL 80 >39 mg/dL    LDL Calculated 110 (H) 0 - 99 mg/dL   Cardiovascular Report    Collection Time: 08/25/23 10:35 AM   Result Value Ref Range    Interpretation Note          Torito Ferguson DO

## 2023-10-03 ENCOUNTER — OFFICE VISIT (OUTPATIENT)
Dept: FAMILY MEDICINE CLINIC | Facility: CLINIC | Age: 48
End: 2023-10-03
Payer: COMMERCIAL

## 2023-10-03 VITALS
DIASTOLIC BLOOD PRESSURE: 60 MMHG | HEART RATE: 80 BPM | BODY MASS INDEX: 26.28 KG/M2 | HEIGHT: 61 IN | RESPIRATION RATE: 18 BRPM | OXYGEN SATURATION: 96 % | WEIGHT: 139.2 LBS | SYSTOLIC BLOOD PRESSURE: 124 MMHG | TEMPERATURE: 97.7 F

## 2023-10-03 DIAGNOSIS — J01.90 ACUTE SINUSITIS, RECURRENCE NOT SPECIFIED, UNSPECIFIED LOCATION: Primary | ICD-10-CM

## 2023-10-03 PROCEDURE — 99213 OFFICE O/P EST LOW 20 MIN: CPT | Performed by: FAMILY MEDICINE

## 2023-10-03 RX ORDER — BENZONATATE 200 MG/1
200 CAPSULE ORAL 3 TIMES DAILY PRN
Qty: 20 CAPSULE | Refills: 0 | Status: SHIPPED | OUTPATIENT
Start: 2023-10-03

## 2023-10-03 RX ORDER — AMOXICILLIN 875 MG/1
875 TABLET, COATED ORAL 2 TIMES DAILY
Qty: 14 TABLET | Refills: 0 | Status: SHIPPED | OUTPATIENT
Start: 2023-10-03 | End: 2023-10-10

## 2023-10-03 NOTE — PROGRESS NOTES
Name: Rickey Cool      : 1975      MRN: [id number]  Encounter Provider: Katerine Salinas MD  Encounter Date: 10/3/2023   Encounter department: 2 [facility]     1. Acute sinusitis, recurrence not specified, unspecified location  -     benzonatate (TESSALON) 200 MG capsule; Take 1 capsule (200 mg total) by mouth 3 (three) times a day as needed for cough  -     amoxicillin (AMOXIL) 875 mg tablet; Take 1 tablet (875 mg total) by mouth 2 (two) times a day for 7 days           Subjective      URI   This is a new problem. Episode onset: 23. The problem has been unchanged. There has been no fever. Associated symptoms include congestion, coughing, headaches, rhinorrhea, sinus pain and a sore throat. Pertinent negatives include no abdominal pain, chest pain, diarrhea, dysuria, ear pain, joint pain, joint swelling, nausea, neck pain, plugged ear sensation, rash, sneezing, swollen glands, vomiting or wheezing. Treatments tried: mucinex, robitussin DM, benadryl  The treatment provided no relief. Review of Systems   HENT: Positive for congestion, rhinorrhea, sinus pain and sore throat. Negative for ear pain and sneezing. Respiratory: Positive for cough. Negative for wheezing. Cardiovascular: Negative for chest pain. Gastrointestinal: Negative for abdominal pain, diarrhea, nausea and vomiting. Genitourinary: Negative for dysuria. Musculoskeletal: Negative for joint pain and neck pain. Skin: Negative for rash. Neurological: Positive for headaches.        Current Outpatient Medications on File Prior to Visit   Medication Sig   • Aimovig 140 MG/ML SOAJ ADMINISTER 1 ML UNDER THE SKIN EVERY 4 WEEKS   • carBAMazepine (CARBATROL) 300 MG 12 hr capsule 300 mg every 12 (twelve) hours    • ibuprofen (MOTRIN) 200 mg tablet Take 200 mg by mouth every 6 (six) hours as needed for mild pain    • rosuvastatin (CRESTOR) 20 MG tablet Take 1 tablet (20 mg total) by mouth daily   • SUMAtriptan (IMITREX) 100 mg tablet Take 1 tablet (100 mg total) by mouth once as needed for migraine for up to 1 dose   • valACYclovir (VALTREX) 500 mg tablet 500 mg daily after dinner As needed   • atorvastatin (LIPITOR) 20 mg tablet Take 1 tablet (20 mg total) by mouth daily (Patient not taking: Reported on 10/3/2023)       Objective     /60   Pulse 80   Temp 97.7 °F (36.5 °C) (Temporal)   Resp 18   Ht 5' 1" (1.549 m)   Wt 63.1 kg (139 lb 3.2 oz)   LMP 08/09/2023 (Approximate)   SpO2 96%   BMI 26.30 kg/m²     Physical Exam  Constitutional:       General: She is not in acute distress. Appearance: She is well-developed. She is not diaphoretic. HENT:      Head: Normocephalic and atraumatic. Right Ear: Tympanic membrane, ear canal and external ear normal. There is no impacted cerumen. Left Ear: Tympanic membrane, ear canal and external ear normal. There is no impacted cerumen. Nose: Nose normal. No congestion or rhinorrhea. Mouth/Throat:      Mouth: Mucous membranes are moist.      Pharynx: Oropharynx is clear. No oropharyngeal exudate or posterior oropharyngeal erythema. Eyes:      General: No scleral icterus. Right eye: No discharge. Left eye: No discharge. Conjunctiva/sclera: Conjunctivae normal.   Cardiovascular:      Rate and Rhythm: Normal rate and regular rhythm. Heart sounds: Normal heart sounds. No murmur heard. No friction rub. No gallop. Pulmonary:      Effort: Pulmonary effort is normal. No respiratory distress. Breath sounds: Normal breath sounds. No wheezing or rales. Chest:      Chest wall: No tenderness. Musculoskeletal:         General: No deformity. Normal range of motion. Cervical back: Normal range of motion and neck supple. Skin:     General: Skin is warm and dry. Neurological:      Mental Status: She is alert and oriented to person, place, and time.    Psychiatric:         Behavior: Behavior normal. Thought Content:  Thought content normal.         Judgment: Judgment normal.       Jigar Payne MD

## 2023-11-04 DIAGNOSIS — G43.109 MIGRAINE WITH AURA AND WITHOUT STATUS MIGRAINOSUS, NOT INTRACTABLE: ICD-10-CM

## 2023-11-06 RX ORDER — SUMATRIPTAN 100 MG/1
100 TABLET, FILM COATED ORAL ONCE AS NEEDED
Qty: 15 TABLET | Refills: 0 | Status: SHIPPED | OUTPATIENT
Start: 2023-11-06

## 2023-12-01 ENCOUNTER — HOSPITAL ENCOUNTER (OUTPATIENT)
Dept: NEUROLOGY | Facility: CLINIC | Age: 48
End: 2023-12-01
Payer: COMMERCIAL

## 2023-12-01 DIAGNOSIS — G40.209 COMPLEX PARTIAL SEIZURES WITH CONSCIOUSNESS IMPAIRED (HCC): ICD-10-CM

## 2023-12-01 PROCEDURE — 95816 EEG AWAKE AND DROWSY: CPT

## 2023-12-07 PROCEDURE — 95816 EEG AWAKE AND DROWSY: CPT | Performed by: PSYCHIATRY & NEUROLOGY

## 2023-12-12 ENCOUNTER — OFFICE VISIT (OUTPATIENT)
Dept: FAMILY MEDICINE CLINIC | Facility: CLINIC | Age: 48
End: 2023-12-12
Payer: COMMERCIAL

## 2023-12-12 ENCOUNTER — TELEPHONE (OUTPATIENT)
Age: 48
End: 2023-12-12

## 2023-12-12 VITALS
SYSTOLIC BLOOD PRESSURE: 126 MMHG | RESPIRATION RATE: 16 BRPM | TEMPERATURE: 100.7 F | HEART RATE: 111 BPM | DIASTOLIC BLOOD PRESSURE: 78 MMHG | BODY MASS INDEX: 26.55 KG/M2 | WEIGHT: 140.6 LBS | HEIGHT: 61 IN

## 2023-12-12 DIAGNOSIS — U07.1 COVID-19: Primary | ICD-10-CM

## 2023-12-12 PROCEDURE — 99213 OFFICE O/P EST LOW 20 MIN: CPT | Performed by: FAMILY MEDICINE

## 2023-12-12 PROCEDURE — 87635 SARS-COV-2 COVID-19 AMP PRB: CPT | Performed by: FAMILY MEDICINE

## 2023-12-12 RX ORDER — BENZONATATE 200 MG/1
200 CAPSULE ORAL 3 TIMES DAILY PRN
Qty: 30 CAPSULE | Refills: 0 | Status: SHIPPED | OUTPATIENT
Start: 2023-12-12

## 2023-12-12 RX ORDER — CODEINE PHOSPHATE/GUAIFENESIN 10-100MG/5
10 LIQUID (ML) ORAL
Qty: 120 ML | Refills: 0 | Status: SHIPPED | OUTPATIENT
Start: 2023-12-12

## 2023-12-12 NOTE — TELEPHONE ENCOUNTER
Guaifenesin-Codeine (GUAIFENESIN AC) 100-10 MG/5ML liquid Prior Auth Approved, pt and pharmacy made aware.    Valid: 12- to 6-

## 2023-12-12 NOTE — TELEPHONE ENCOUNTER
Guaifenesin-Codeine (GUAIFENESIN AC) 100-10 MG/5ML liquid Prior Auth submitted with clinical documentation via surescripts, waiting on determination.

## 2023-12-12 NOTE — PROGRESS NOTES
Assessment/Plan:    1. COVID-19  -     COVID Only - Office Collect  -     benzonatate (TESSALON) 200 MG capsule; Take 1 capsule (200 mg total) by mouth 3 (three) times a day as needed for cough  -     guaifenesin-codeine (GUAIFENESIN AC) 100-10 MG/5ML liquid; Take 10 mL by mouth daily at bedtime as needed for cough    Advised on slef isolation  Vt c c,d and zinc  Dayquil and nyquil        There are no Patient Instructions on file for this visit. No follow-ups on file. Subjective:      Patient ID: Andrea Green is a 50 y.o. female. Chief Complaint   Patient presents with   • COVID-19     Tested positive today Roseann Almazan LPN     • Sore Throat     Symptoms started yesterday   • Cough   • Nasal Congestion   • Chills   • Generalized Body Aches       Pt is sched for body achs etc  Home covid positive   Day 1 was yesterday  [place] , sore throat, congestion , body achs fever, indigestion    Fever - 9 weeks to 74 years   The problem has been unchanged. The maximum temperature noted was 101 to 101.9 F. The temperature was taken using a tympanic thermometer. Associated symptoms include congestion, coughing, headaches and a sore throat. Sore Throat   Associated symptoms include congestion, coughing and headaches. Cough  Associated symptoms include a fever, headaches and a sore throat. Generalized Body Aches  Associated symptoms include congestion, headaches, a sore throat, a fever and coughing. The following portions of the patient's history were reviewed and updated as appropriate: allergies, current medications, past family history, past medical history, past social history, past surgical history and problem list.    Review of Systems   Constitutional:  Positive for fever. HENT:  Positive for congestion and sore throat. Respiratory:  Positive for cough. Neurological:  Positive for headaches.          Current Outpatient Medications   Medication Sig Dispense Refill   • Aimovig 140 MG/ML SOAJ ADMINISTER 1 ML UNDER THE SKIN EVERY 4 WEEKS     • benzonatate (TESSALON) 200 MG capsule Take 1 capsule (200 mg total) by mouth 3 (three) times a day as needed for cough 30 capsule 0   • carBAMazepine (CARBATROL) 300 MG 12 hr capsule 300 mg every 12 (twelve) hours   1   • guaifenesin-codeine (GUAIFENESIN AC) 100-10 MG/5ML liquid Take 10 mL by mouth daily at bedtime as needed for cough 120 mL 0   • ibuprofen (MOTRIN) 200 mg tablet Take 200 mg by mouth every 6 (six) hours as needed for mild pain      • rosuvastatin (CRESTOR) 20 MG tablet Take 1 tablet (20 mg total) by mouth daily 90 tablet 1   • SUMAtriptan (IMITREX) 100 mg tablet Take 1 tablet (100 mg total) by mouth once as needed for migraine for up to 15 doses 15 tablet 0   • valACYclovir (VALTREX) 500 mg tablet 500 mg daily As needed  2     No current facility-administered medications for this visit. Objective:    /78   Pulse (!) 111   Temp (!) 100.7 °F (38.2 °C)   Resp 16   Ht 5' 1" (1.549 m)   Wt 63.8 kg (140 lb 9.6 oz)   BMI 26.57 kg/m²        Physical Exam  HENT:      Nose: Congestion and rhinorrhea present.                 Crystal Chavez DO

## 2023-12-13 ENCOUNTER — TELEPHONE (OUTPATIENT)
Dept: FAMILY MEDICINE CLINIC | Facility: CLINIC | Age: 48
End: 2023-12-13

## 2023-12-13 LAB — SARS-COV-2 RNA RESP QL NAA+PROBE: POSITIVE

## 2023-12-13 NOTE — TELEPHONE ENCOUNTER
Patient is aware  She was having issues coughing/ acid reflex  Stacia recommended zantac    NFA  Nirmala Bernardo CMA

## 2023-12-16 DIAGNOSIS — G43.109 MIGRAINE WITH AURA AND WITHOUT STATUS MIGRAINOSUS, NOT INTRACTABLE: ICD-10-CM

## 2023-12-18 RX ORDER — SUMATRIPTAN 100 MG/1
100 TABLET, FILM COATED ORAL ONCE AS NEEDED
Qty: 15 TABLET | Refills: 0 | Status: SHIPPED | OUTPATIENT
Start: 2023-12-18

## 2023-12-20 ENCOUNTER — OFFICE VISIT (OUTPATIENT)
Dept: FAMILY MEDICINE CLINIC | Facility: CLINIC | Age: 48
End: 2023-12-20
Payer: COMMERCIAL

## 2023-12-20 VITALS
DIASTOLIC BLOOD PRESSURE: 74 MMHG | SYSTOLIC BLOOD PRESSURE: 108 MMHG | BODY MASS INDEX: 25.86 KG/M2 | HEART RATE: 93 BPM | RESPIRATION RATE: 16 BRPM | OXYGEN SATURATION: 99 % | WEIGHT: 137 LBS | TEMPERATURE: 98 F | HEIGHT: 61 IN

## 2023-12-20 DIAGNOSIS — U07.1 COVID-19: Primary | ICD-10-CM

## 2023-12-20 PROCEDURE — 99213 OFFICE O/P EST LOW 20 MIN: CPT | Performed by: FAMILY MEDICINE

## 2023-12-20 NOTE — PROGRESS NOTES
COVID-19 Outpatient Progress Note    Assessment/Plan:    Problem List Items Addressed This Visit    None  Visit Diagnoses       COVID-19    -  Primary           Disposition:     Patient with moderate or severe COVID-19. They should isolate from others through at least day 10. Isolation can be ended if symptoms are improving and they are fever free for the past 24 hours. If they still have fever or other symptoms have not improved, continue to isolate until they improve. Regardless of when you isolation is ended, avoid being around people who are more likely to get very sick from COVID-19 until at least day 11. Patient's with severe COVID-19 may need to isolate up to 20 days from symptom onset.    Discussed symptom directed medication options with patient. Discussed vitamin D, vitamin C, and/or zinc supplementation with patient.     I have spent a total time of 15 minutes on the day of the encounter for this patient including       Encounter provider: Angelica Ruiz MD     Provider located at: [facility]  [address] [id number]     Recent Visits  Date Type Provider Dept   12/13/23 Telephone Frank Lombardi, DO [facility]   Showing recent visits within past 7 days and meeting all other requirements  Today's Visits  Date Type Provider Dept   12/20/23 Office Visit Angelica Ruiz MD [facility]   Showing today's visits and meeting all other requirements  Future Appointments  No visits were found meeting these conditions.  Showing future appointments within next 150 days and meeting all other requirements     Subjective:   Karly Howell is a 48 y.o. female who has been screened for COVID-19. Patient's symptoms include nasal congestion, sore throat, cough, shortness of breath and headache. Patient denies fever, chills, rhinorrhea, anosmia, loss of taste, chest tightness, abdominal pain, nausea, vomiting and diarrhea.     - Date of symptom onset:  "12/11/2023  - Date of positive COVID-19 test: 12/12/2023. Type of test: Home antigen.     COVID-19 vaccination status: Fully vaccinated with booster    Lab Results   Component Value Date    SARSCOV2 Positive (A) 12/12/2023    SARSCOV2 Negative 10/09/2021       Review of Systems   Constitutional: Negative.  Negative for chills and fever.   HENT:  Positive for congestion and sore throat. Negative for rhinorrhea.    Eyes: Negative.    Respiratory:  Positive for cough and shortness of breath. Negative for chest tightness.    Cardiovascular: Negative.    Gastrointestinal: Negative.  Negative for abdominal pain, diarrhea, nausea and vomiting.   Endocrine: Negative.    Genitourinary: Negative.    Musculoskeletal: Negative.    Skin: Negative.    Allergic/Immunologic: Negative.    Neurological:  Positive for headaches.   Hematological: Negative.    Psychiatric/Behavioral: Negative.       Current Outpatient Medications on File Prior to Visit   Medication Sig    Aimovig 140 MG/ML SOAJ ADMINISTER 1 ML UNDER THE SKIN EVERY 4 WEEKS    benzonatate (TESSALON) 200 MG capsule Take 1 capsule (200 mg total) by mouth 3 (three) times a day as needed for cough    carBAMazepine (CARBATROL) 300 MG 12 hr capsule 300 mg every 12 (twelve) hours     guaifenesin-codeine (GUAIFENESIN AC) 100-10 MG/5ML liquid Take 10 mL by mouth daily at bedtime as needed for cough    ibuprofen (MOTRIN) 200 mg tablet Take 200 mg by mouth every 6 (six) hours as needed for mild pain     rosuvastatin (CRESTOR) 20 MG tablet Take 1 tablet (20 mg total) by mouth daily    SUMAtriptan (IMITREX) 100 mg tablet Take 1 tablet (100 mg total) by mouth once as needed for migraine for up to 15 doses    valACYclovir (VALTREX) 500 mg tablet 500 mg daily As needed       Objective:    /74   Pulse 93   Temp 98 °F (36.7 °C)   Resp 16   Ht 5' 1\" (1.549 m)   Wt 62.1 kg (137 lb)   SpO2 99%   BMI 25.89 kg/m²        Physical Exam  Constitutional:       General: She is not in " acute distress.     Appearance: Normal appearance. She is normal weight. She is not ill-appearing, toxic-appearing or diaphoretic.   HENT:      Head: Normocephalic and atraumatic.      Right Ear: Tympanic membrane, ear canal and external ear normal. There is no impacted cerumen.      Left Ear: Tympanic membrane, ear canal and external ear normal. There is no impacted cerumen.      Nose: Nose normal.      Mouth/Throat:      Mouth: Mucous membranes are moist.      Pharynx: Oropharynx is clear. No oropharyngeal exudate or posterior oropharyngeal erythema.   Eyes:      General: No scleral icterus.        Right eye: No discharge.         Left eye: No discharge.      Extraocular Movements: Extraocular movements intact.      Conjunctiva/sclera: Conjunctivae normal.      Pupils: Pupils are equal, round, and reactive to light.   Cardiovascular:      Rate and Rhythm: Normal rate and regular rhythm.      Pulses: Normal pulses.      Heart sounds: Normal heart sounds. No murmur heard.     No friction rub. No gallop.   Pulmonary:      Effort: Pulmonary effort is normal. No respiratory distress.      Breath sounds: Normal breath sounds. No stridor. No wheezing, rhonchi or rales.   Chest:      Chest wall: No tenderness.   Musculoskeletal:         General: Normal range of motion.   Skin:     General: Skin is warm and dry.   Neurological:      General: No focal deficit present.      Mental Status: She is alert and oriented to person, place, and time.       Angelica Ruiz MD

## 2024-01-03 ENCOUNTER — TELEPHONE (OUTPATIENT)
Age: 49
End: 2024-01-03

## 2024-01-03 NOTE — TELEPHONE ENCOUNTER
Attempted to call the patient two times and could not connect  Try again later    Nirmala Bernardo CMA

## 2024-01-03 NOTE — TELEPHONE ENCOUNTER
Pt stated she had covid on 12/11/23. Pt wanted to know if it is ok that she gets a flu shot.     Please contact pt and advise. Thank you for your assistance.

## 2024-01-18 ENCOUNTER — OFFICE VISIT (OUTPATIENT)
Dept: FAMILY MEDICINE CLINIC | Facility: CLINIC | Age: 49
End: 2024-01-18
Payer: COMMERCIAL

## 2024-01-18 ENCOUNTER — TELEPHONE (OUTPATIENT)
Dept: FAMILY MEDICINE CLINIC | Facility: CLINIC | Age: 49
End: 2024-01-18

## 2024-01-18 VITALS
BODY MASS INDEX: 25.68 KG/M2 | HEART RATE: 120 BPM | RESPIRATION RATE: 18 BRPM | SYSTOLIC BLOOD PRESSURE: 116 MMHG | TEMPERATURE: 98.8 F | WEIGHT: 136 LBS | HEIGHT: 61 IN | DIASTOLIC BLOOD PRESSURE: 72 MMHG

## 2024-01-18 DIAGNOSIS — J11.1 INFLUENZA: ICD-10-CM

## 2024-01-18 DIAGNOSIS — B34.9 VIRAL INFECTION, UNSPECIFIED: ICD-10-CM

## 2024-01-18 DIAGNOSIS — G43.109 MIGRAINE WITH AURA AND WITHOUT STATUS MIGRAINOSUS, NOT INTRACTABLE: ICD-10-CM

## 2024-01-18 DIAGNOSIS — G40.209 LOCALIZATION-RELATED (FOCAL) (PARTIAL) SYMPTOMATIC EPILEPSY AND EPILEPTIC SYNDROMES WITH COMPLEX PARTIAL SEIZURES, NOT INTRACTABLE, WITHOUT STATUS EPILEPTICUS (HCC): ICD-10-CM

## 2024-01-18 DIAGNOSIS — R50.9 FEVER, UNSPECIFIED FEVER CAUSE: Primary | ICD-10-CM

## 2024-01-18 LAB
SL AMB POCT RAPID FLU A: NORMAL
SL AMB POCT RAPID FLU B: NORMAL

## 2024-01-18 PROCEDURE — 99213 OFFICE O/P EST LOW 20 MIN: CPT | Performed by: FAMILY MEDICINE

## 2024-01-18 PROCEDURE — 87636 SARSCOV2 & INF A&B AMP PRB: CPT | Performed by: FAMILY MEDICINE

## 2024-01-18 PROCEDURE — 87804 INFLUENZA ASSAY W/OPTIC: CPT | Performed by: FAMILY MEDICINE

## 2024-01-18 RX ORDER — BENZONATATE 200 MG/1
200 CAPSULE ORAL 3 TIMES DAILY PRN
Qty: 30 CAPSULE | Refills: 0 | Status: SHIPPED | OUTPATIENT
Start: 2024-01-18

## 2024-01-18 RX ORDER — OSELTAMIVIR PHOSPHATE 75 MG/1
75 CAPSULE ORAL 2 TIMES DAILY
Qty: 10 CAPSULE | Refills: 0 | Status: SHIPPED | OUTPATIENT
Start: 2024-01-18 | End: 2024-01-23 | Stop reason: ALTCHOICE

## 2024-01-18 NOTE — TELEPHONE ENCOUNTER
Patient scheduled an appointment for 1/18 at 11:15 with Dr. Lombardi.  There isn't a reason for the appointment. Please call patient to obtain reason for visit.

## 2024-01-18 NOTE — PROGRESS NOTES
Assessment/Plan:    1. Fever, unspecified fever cause  -     POCT rapid flu A and B  -     benzonatate (TESSALON) 200 MG capsule; Take 1 capsule (200 mg total) by mouth 3 (three) times a day as needed for cough    2. Viral infection, unspecified  -     Covid/Flu- Office Collect  -     benzonatate (TESSALON) 200 MG capsule; Take 1 capsule (200 mg total) by mouth 3 (three) times a day as needed for cough    3. Influenza  -     oseltamivir (TAMIFLU) 75 mg capsule; Take 1 capsule (75 mg total) by mouth 2 (two) times a day for 5 days    4. Localization-related (focal) (partial) symptomatic epilepsy and epileptic syndromes with complex partial seizures, not intractable, without status epilepticus (HCC)            There are no Patient Instructions on file for this visit.    No follow-ups on file.    Subjective:      Patient ID: Karly Howell is a 48 y.o. female.    Chief Complaint   Patient presents with   • Cough     X 1 day  Bernadette Florian CMA    • Cold Like Symptoms   • Chills   • Night Sweats   • Nasal Congestion   • Headache   • Fever     102 this morning        Pt states her son is sick - left school, isaak to urgent care - was advised they thought he had the flue - result is not back.  Pt has a bad cough  Stomach symptoms.  States the cough makes her feel like she is going to vomit  Fever at home 102 this am            The following portions of the patient's history were reviewed and updated as appropriate: allergies, current medications, past family history, past medical history, past social history, past surgical history and problem list.    Review of Systems   Constitutional:  Positive for chills, fatigue and fever.   HENT:  Positive for congestion.    Respiratory:  Positive for cough.          Current Outpatient Medications   Medication Sig Dispense Refill   • Aimovig 140 MG/ML SOAJ ADMINISTER 1 ML UNDER THE SKIN EVERY 4 WEEKS     • benzonatate (TESSALON) 200 MG capsule Take 1 capsule (200 mg total) by mouth 3  "(three) times a day as needed for cough 30 capsule 0   • carBAMazepine (CARBATROL) 300 MG 12 hr capsule 300 mg every 12 (twelve) hours   1   • ibuprofen (MOTRIN) 200 mg tablet Take 200 mg by mouth every 6 (six) hours as needed for mild pain      • oseltamivir (TAMIFLU) 75 mg capsule Take 1 capsule (75 mg total) by mouth 2 (two) times a day for 5 days 10 capsule 0   • rosuvastatin (CRESTOR) 20 MG tablet Take 1 tablet (20 mg total) by mouth daily 90 tablet 1   • SUMAtriptan (IMITREX) 100 mg tablet Take 1 tablet (100 mg total) by mouth once as needed for migraine for up to 15 doses 15 tablet 0   • valACYclovir (VALTREX) 500 mg tablet 500 mg daily As needed  2     No current facility-administered medications for this visit.       Objective:    /72   Pulse (!) 120   Temp 98.8 °F (37.1 °C)   Resp 18   Ht 5' 1\" (1.549 m)   Wt 61.7 kg (136 lb)   LMP 11/15/2023 (Approximate)   BMI 25.70 kg/m²        Physical Exam  HENT:      Nose: Congestion and rhinorrhea present.                Frank Lombardi, DO  "

## 2024-01-19 ENCOUNTER — TELEPHONE (OUTPATIENT)
Dept: FAMILY MEDICINE CLINIC | Facility: CLINIC | Age: 49
End: 2024-01-19

## 2024-01-19 LAB
FLUAV RNA RESP QL NAA+PROBE: POSITIVE
FLUBV RNA RESP QL NAA+PROBE: NEGATIVE
SARS-COV-2 RNA RESP QL NAA+PROBE: POSITIVE

## 2024-01-19 RX ORDER — SUMATRIPTAN 100 MG/1
100 TABLET, FILM COATED ORAL ONCE AS NEEDED
Qty: 15 TABLET | Refills: 1 | Status: SHIPPED | OUTPATIENT
Start: 2024-01-19

## 2024-01-19 NOTE — TELEPHONE ENCOUNTER
Please let pt know her covid is still posaitive - she expected that but more interestingly her flu is positive.  Rapid was neg in the office

## 2024-01-23 ENCOUNTER — OFFICE VISIT (OUTPATIENT)
Dept: FAMILY MEDICINE CLINIC | Facility: CLINIC | Age: 49
End: 2024-01-23
Payer: COMMERCIAL

## 2024-01-23 VITALS
DIASTOLIC BLOOD PRESSURE: 80 MMHG | WEIGHT: 136 LBS | SYSTOLIC BLOOD PRESSURE: 110 MMHG | TEMPERATURE: 97.8 F | RESPIRATION RATE: 16 BRPM | BODY MASS INDEX: 25.68 KG/M2 | HEART RATE: 88 BPM | HEIGHT: 61 IN

## 2024-01-23 DIAGNOSIS — J10.1 INFLUENZA A: Primary | ICD-10-CM

## 2024-01-23 PROCEDURE — 99213 OFFICE O/P EST LOW 20 MIN: CPT | Performed by: FAMILY MEDICINE

## 2024-01-23 NOTE — PROGRESS NOTES
Name: Karly Howell      : 1975      MRN: [id number]  Encounter Provider: Angelcia Ruiz MD  Encounter Date: 2024   Encounter department: [facility]    Assessment & Plan     1. Influenza A    Symptoms are slowly improving after completing tamiflu course, but she continues to have a persistent cough and fatigue. Recommend rest, increased hydration, continuing tessalon for cough as well as warm tea with honey/lemon/ginger. No fevers over the past 24 hours and her upper respiratory symptoms are improving. I believe she likely can return to work on Thursday- note provided.   She is aware to let me know if anything worsens.        Subjective      HPI  Diagnosed with influenza A on 24. Continues to have dry cough, fatigue. Symptoms are improving but not as well as she had hoped. Completed tamiflu.    Denies current SOB, wheezing, purulent sputum, fevers/chills, congestion.      Review of Systems   Constitutional:  Positive for fatigue.   HENT: Negative.     Eyes: Negative.    Respiratory:  Positive for cough.    Cardiovascular: Negative.    Gastrointestinal: Negative.    Endocrine: Negative.    Genitourinary: Negative.    Musculoskeletal: Negative.    Skin: Negative.    Allergic/Immunologic: Negative.    Neurological: Negative.    Hematological: Negative.    Psychiatric/Behavioral: Negative.         Current Outpatient Medications on File Prior to Visit   Medication Sig    Aimovig 140 MG/ML SOAJ ADMINISTER 1 ML UNDER THE SKIN EVERY 4 WEEKS    benzonatate (TESSALON) 200 MG capsule Take 1 capsule (200 mg total) by mouth 3 (three) times a day as needed for cough    carBAMazepine (CARBATROL) 300 MG 12 hr capsule 300 mg every 12 (twelve) hours     ibuprofen (MOTRIN) 200 mg tablet Take 200 mg by mouth every 6 (six) hours as needed for mild pain     rosuvastatin (CRESTOR) 20 MG tablet Take 1 tablet (20 mg total) by mouth daily    SUMAtriptan (IMITREX) 100 mg tablet Take 1 tablet (100 mg total) by  "mouth once as needed for migraine for up to 30 doses    valACYclovir (VALTREX) 500 mg tablet 500 mg daily As needed    [DISCONTINUED] oseltamivir (TAMIFLU) 75 mg capsule Take 1 capsule (75 mg total) by mouth 2 (two) times a day for 5 days (Patient not taking: Reported on 1/23/2024)       Objective     /80   Pulse 88   Temp 97.8 °F (36.6 °C)   Resp 16   Ht 5' 1\" (1.549 m)   Wt 61.7 kg (136 lb)   LMP 11/15/2023 (Approximate)   BMI 25.70 kg/m²     Physical Exam  Constitutional:       General: She is not in acute distress.     Appearance: She is well-developed. She is not diaphoretic.   HENT:      Head: Normocephalic and atraumatic.      Right Ear: Tympanic membrane, ear canal and external ear normal. There is no impacted cerumen.      Left Ear: Tympanic membrane, ear canal and external ear normal. There is no impacted cerumen.      Nose: Nose normal. No congestion or rhinorrhea.      Mouth/Throat:      Mouth: Mucous membranes are moist.      Pharynx: Oropharynx is clear. No oropharyngeal exudate or posterior oropharyngeal erythema.   Eyes:      General: No scleral icterus.        Right eye: No discharge.         Left eye: No discharge.      Conjunctiva/sclera: Conjunctivae normal.   Cardiovascular:      Rate and Rhythm: Normal rate and regular rhythm.      Heart sounds: Normal heart sounds. No murmur heard.     No friction rub. No gallop.   Pulmonary:      Effort: Pulmonary effort is normal. No respiratory distress.      Breath sounds: Normal breath sounds. No wheezing or rales.   Chest:      Chest wall: No tenderness.   Musculoskeletal:         General: No deformity. Normal range of motion.      Cervical back: Normal range of motion and neck supple.   Skin:     General: Skin is warm and dry.   Neurological:      Mental Status: She is alert and oriented to person, place, and time.   Psychiatric:         Behavior: Behavior normal.         Thought Content: Thought content normal.         Judgment: Judgment " normal.       Angelica Ruiz MD

## 2024-01-23 NOTE — LETTER
January 23, 2024     Patient: Karly Howell  YOB: 1975  Date of Visit: 1/23/2024      To Whom it May Concern:    Karly Howell is under my professional care. Karly was seen in my office on 1/18/24. Karly may return to work on 1/25/24 .    If you have any questions or concerns, please don't hesitate to call.         Sincerely,          Angelica Ruiz MD

## 2024-01-23 NOTE — LETTER
January 23, 2024     Patient: Karly Howell  YOB: 1975  Date of Visit: 1/23/2024      To Whom it May Concern:    Karly Howell is under my professional care. Karly was seen in my office on 1/23/2024. Karly may return to work on 1/25/24 .    If you have any questions or concerns, please don't hesitate to call.         Sincerely,          Angelica Ruiz MD

## 2024-01-26 ENCOUNTER — TELEPHONE (OUTPATIENT)
Age: 49
End: 2024-01-26

## 2024-01-26 DIAGNOSIS — R05.2 SUBACUTE COUGH: Primary | ICD-10-CM

## 2024-01-26 RX ORDER — METHYLPREDNISOLONE 4 MG/1
TABLET ORAL
Qty: 21 EACH | Refills: 0 | Status: SHIPPED | OUTPATIENT
Start: 2024-01-26

## 2024-01-26 NOTE — TELEPHONE ENCOUNTER
Pt is still coughing a lot.  Pt said she is coughing so much it is making her dizzy, she is unable to sleep as well.  She is using the tessalon perles, not TID, she stated they do take the edge off the cough but not completley.  She has not taken one yet today.   wanted her to call and ask if provider will give her a steroid to help.

## 2024-02-28 ENCOUNTER — HOSPITAL ENCOUNTER (OUTPATIENT)
Dept: MRI IMAGING | Facility: HOSPITAL | Age: 49
Discharge: HOME | End: 2024-02-28
Attending: PSYCHIATRY & NEUROLOGY
Payer: COMMERCIAL

## 2024-02-28 DIAGNOSIS — G40.219 LOCALIZATION-RELATED (FOCAL) (PARTIAL) SYMPTOMATIC EPILEPSY AND EPILEPTIC SYNDROMES WITH COMPLEX PARTIAL SEIZURES, INTRACTABLE, WITHOUT STATUS EPILEPTICUS (HCC): ICD-10-CM

## 2024-02-28 PROCEDURE — 70553 MRI BRAIN STEM W/O & W/DYE: CPT

## 2024-02-28 PROCEDURE — G1004 CDSM NDSC: HCPCS

## 2024-02-28 PROCEDURE — A9585 GADOBUTROL INJECTION: HCPCS | Performed by: RADIOLOGY

## 2024-02-28 RX ORDER — GADOBUTROL 604.72 MG/ML
6 INJECTION INTRAVENOUS
Status: COMPLETED | OUTPATIENT
Start: 2024-02-28 | End: 2024-02-28

## 2024-02-28 RX ADMIN — GADOBUTROL 6 ML: 604.72 INJECTION INTRAVENOUS at 08:26

## 2024-03-15 DIAGNOSIS — G43.109 MIGRAINE WITH AURA AND WITHOUT STATUS MIGRAINOSUS, NOT INTRACTABLE: ICD-10-CM

## 2024-03-15 RX ORDER — SUMATRIPTAN 100 MG/1
100 TABLET, FILM COATED ORAL ONCE AS NEEDED
Qty: 30 TABLET | Refills: 1 | Status: SHIPPED | OUTPATIENT
Start: 2024-03-15

## 2024-04-25 DIAGNOSIS — G43.109 MIGRAINE WITH AURA AND WITHOUT STATUS MIGRAINOSUS, NOT INTRACTABLE: ICD-10-CM

## 2024-04-26 RX ORDER — SUMATRIPTAN 100 MG/1
100 TABLET, FILM COATED ORAL ONCE AS NEEDED
Qty: 30 TABLET | Refills: 0 | Status: SHIPPED | OUTPATIENT
Start: 2024-04-26

## 2024-05-03 ENCOUNTER — APPOINTMENT (OUTPATIENT)
Dept: RADIOLOGY | Facility: CLINIC | Age: 49
End: 2024-05-03
Payer: COMMERCIAL

## 2024-05-03 ENCOUNTER — OFFICE VISIT (OUTPATIENT)
Dept: OBGYN CLINIC | Facility: CLINIC | Age: 49
End: 2024-05-03
Payer: COMMERCIAL

## 2024-05-03 VITALS
HEART RATE: 89 BPM | SYSTOLIC BLOOD PRESSURE: 131 MMHG | HEIGHT: 61 IN | DIASTOLIC BLOOD PRESSURE: 84 MMHG | WEIGHT: 136 LBS | BODY MASS INDEX: 25.68 KG/M2

## 2024-05-03 DIAGNOSIS — M54.50 ACUTE LEFT-SIDED LOW BACK PAIN WITHOUT SCIATICA: Primary | ICD-10-CM

## 2024-05-03 DIAGNOSIS — M54.50 LOW BACK PAIN, UNSPECIFIED BACK PAIN LATERALITY, UNSPECIFIED CHRONICITY, UNSPECIFIED WHETHER SCIATICA PRESENT: ICD-10-CM

## 2024-05-03 PROCEDURE — 99213 OFFICE O/P EST LOW 20 MIN: CPT | Performed by: ORTHOPAEDIC SURGERY

## 2024-05-03 PROCEDURE — 72100 X-RAY EXAM L-S SPINE 2/3 VWS: CPT

## 2024-05-03 RX ORDER — PREDNISONE 10 MG/1
TABLET ORAL
COMMUNITY
Start: 2024-03-08

## 2024-05-03 RX ORDER — LORAZEPAM 2 MG/1
TABLET ORAL
COMMUNITY
Start: 2024-02-25

## 2024-05-03 NOTE — PROGRESS NOTES
Assessment/Plan:  1. Acute left-sided low back pain without sciatica  XR spine lumbar 2 or 3 views injury    Ambulatory referral to Physical Therapy          Karly has lower back pain which seems muscular in nature.  Her x-rays demonstrate a slight spinal asymmetry but no severe curvature.  She has no significant degenerative changes on her x-ray and her physical exam is reassuring without any obvious signs of lumbar radiculopathy.  I do think she will do well with formal physical therapy and have recommended that she begin PT and follow-up with me after therapy if pain persists or worsens.  She verbalized understand this plan.    Subjective:   Karly Howell is a 48 y.o. female who presents to the office for evaluation for lower back pain.  She has been feeling increased discomfort in her lower back for the last few weeks.  She denies any injury or trauma.  She has recently begun exercising more and doing a lot of squats for exercise.  She seems to be increasing the amount of squats each day over time and increasing the intensity.  She has been doing this for the last 2 months and the pain seems to be more persistent in the last 1 month.  She feels an aching throbbing pain mostly on the left side of her lower back it comes and goes at times and increases with more physical activity.  She denies any numbness or tingling or rating pain down her legs.  She denies any weakness in her lower extremities.      Review of Systems   Constitutional:  Negative for chills, fever and unexpected weight change.   HENT:  Negative for hearing loss, nosebleeds and sore throat.    Eyes:  Negative for pain, redness and visual disturbance.   Respiratory:  Negative for cough, shortness of breath and wheezing.    Cardiovascular:  Negative for chest pain, palpitations and leg swelling.   Gastrointestinal:  Negative for abdominal pain, nausea and vomiting.   Endocrine: Negative for polydipsia and polyuria.   Genitourinary:  Negative for  dysuria and hematuria.   Musculoskeletal:         See HPI   Skin:  Negative for rash and wound.   Neurological:  Negative for dizziness, numbness and headaches.   Psychiatric/Behavioral:  Negative for decreased concentration and suicidal ideas. The patient is not nervous/anxious.          Past Medical History:   Diagnosis Date    Cervical radiculopathy     RESOLVED: [id number] buldging disc    Hyperlipidemia     Kidney stone     Migraines     Seizure (HCC)     age 24       Past Surgical History:   Procedure Laterality Date    ADENOIDECTOMY      COLONOSCOPY      LITHOTRIPSY      RENAL    OVARIAN CYST REMOVAL      ND HYSTEROSCOPY BX ENDOMETRIUM&/POLYPC W/WO D&C N/A 10/28/2019    Procedure: OPERATIVE HYSTEROSCOPY (MYOSURE),POLYPECTOMY,D AND C;  Surgeon: Roel Rothman MD;  Location: [facility];  Service: Gynecology       Family History   Problem Relation Age of Onset    Multiple sclerosis Mother     Colon cancer Maternal Grandmother 70    Rheum arthritis Maternal Grandmother     Arthritis Family     Colon cancer Family     Osteoporosis Family     Anxiety disorder Father     Chromosomal disorder Cousin     No Known Problems Sister     No Known Problems Daughter     Skin cancer Maternal Grandfather 65        not sure of exact age.    No Known Problems Paternal Grandmother     No Known Problems Paternal Grandfather     No Known Problems Daughter     No Known Problems Son     No Known Problems Maternal Aunt     No Known Problems Maternal Aunt     No Known Problems Paternal Aunt        Social History     Occupational History    Not on file   Tobacco Use    Smoking status: Former     Current packs/day: 0.00     Average packs/day: 1 pack/day for 20.0 years (20.0 ttl pk-yrs)     Types: Cigarettes     Start date: 6/15/1992     Quit date: 2007     Years since quittin.0     Passive exposure: Never    Smokeless tobacco: Never   Vaping Use    Vaping status: Never Used   Substance and Sexual Activity    Alcohol use:  Yes     Comment: rare    Drug use: Never    Sexual activity: Not on file         Current Outpatient Medications:     Aimovig 140 MG/ML SOAJ, ADMINISTER 1 ML UNDER THE SKIN EVERY 4 WEEKS, Disp: , Rfl:     carBAMazepine (CARBATROL) 300 MG 12 hr capsule, 300 mg every 12 (twelve) hours , Disp: , Rfl: 1    ibuprofen (MOTRIN) 200 mg tablet, Take 200 mg by mouth every 6 (six) hours as needed for mild pain , Disp: , Rfl:     rosuvastatin (CRESTOR) 20 MG tablet, Take 1 tablet (20 mg total) by mouth daily, Disp: 90 tablet, Rfl: 1    SUMAtriptan (IMITREX) 100 mg tablet, Take 1 tablet (100 mg total) by mouth once as needed for migraine for up to 60 doses, Disp: 30 tablet, Rfl: 0    valACYclovir (VALTREX) 500 mg tablet, 500 mg daily As needed, Disp: , Rfl: 2    benzonatate (TESSALON) 200 MG capsule, Take 1 capsule (200 mg total) by mouth 3 (three) times a day as needed for cough (Patient not taking: Reported on 5/3/2024), Disp: 30 capsule, Rfl: 0    LORazepam (ATIVAN) 2 mg tablet, TAKE 1 TABLET BY MOUTH 20 MINUTES PRIOR TO SCHEDULED MRI FOR SEDATION (Patient not taking: Reported on 5/3/2024), Disp: , Rfl:     methylPREDNISolone 4 MG tablet therapy pack, Use as directed on package, Disp: 21 each, Rfl: 0    predniSONE 10 mg tablet, , Disp: , Rfl:     Allergies   Allergen Reactions    Ciprofloxacin Lightheadedness     dizzy    Phenytoin Rash     Reaction Date: 05Oct2010;        Objective:  Vitals:    05/03/24 1325   BP: 131/84   Pulse: 89     Pain Score:   3      Back Exam     Tenderness   Back tenderness location: Left lumbar paraspinal tenderness.    Range of Motion   Extension:  normal   Flexion:  normal     Muscle Strength   Right Quadriceps:  5/5   Left Quadriceps:  5/5   Right Hamstrings:  5/5   Left Hamstrings:  5/5     Tests   Straight leg raise right: negative  Straight leg raise left: negative    Other   Sensation: normal  Gait: normal             Physical Exam  Vitals and nursing note reviewed.   Constitutional:        Appearance: Normal appearance. She is well-developed.   HENT:      Head: Normocephalic and atraumatic.      Right Ear: External ear normal.      Left Ear: External ear normal.   Eyes:      General: No scleral icterus.     Extraocular Movements: Extraocular movements intact.      Conjunctiva/sclera: Conjunctivae normal.   Cardiovascular:      Rate and Rhythm: Normal rate.   Pulmonary:      Effort: Pulmonary effort is normal. No respiratory distress.   Musculoskeletal:      Cervical back: Normal range of motion and neck supple.      Lumbar back: Negative right straight leg raise test and negative left straight leg raise test.      Comments: See Ortho exam   Skin:     General: Skin is warm and dry.   Neurological:      General: No focal deficit present.      Mental Status: She is alert and oriented to person, place, and time.   Psychiatric:         Behavior: Behavior normal.         I have personally reviewed pertinent films in PACS and my interpretation is as follows:  X-ray of the lumbar spine demonstrates no evidence of acute fracture.  No significant degenerative changes.  Slight spinal asymmetry without obvious scoliosis.      This document was created using speech voice recognition software.   Grammatical errors, random word insertions, pronoun errors, and incomplete sentences are an occasional consequence of this system due to software limitations, ambient noise, and hardware issues.   Any formal questions or concerns about content, text, or information contained within the body of this dictation should be directly addressed to the provider for clarification.

## 2024-05-04 DIAGNOSIS — E78.2 HYPERLIPEMIA, MIXED: ICD-10-CM

## 2024-05-05 RX ORDER — ROSUVASTATIN CALCIUM 20 MG/1
20 TABLET, COATED ORAL DAILY
Qty: 90 TABLET | Refills: 0 | Status: SHIPPED | OUTPATIENT
Start: 2024-05-05

## 2024-05-14 ENCOUNTER — APPOINTMENT (OUTPATIENT)
Dept: RADIOLOGY | Facility: CLINIC | Age: 49
End: 2024-05-14
Payer: COMMERCIAL

## 2024-05-14 ENCOUNTER — OFFICE VISIT (OUTPATIENT)
Dept: FAMILY MEDICINE CLINIC | Facility: CLINIC | Age: 49
End: 2024-05-14
Payer: COMMERCIAL

## 2024-05-14 VITALS
BODY MASS INDEX: 32.59 KG/M2 | HEIGHT: 61 IN | SYSTOLIC BLOOD PRESSURE: 118 MMHG | DIASTOLIC BLOOD PRESSURE: 70 MMHG | TEMPERATURE: 97.7 F | RESPIRATION RATE: 18 BRPM | WEIGHT: 172.6 LBS | HEART RATE: 98 BPM

## 2024-05-14 DIAGNOSIS — M25.512 ACUTE PAIN OF LEFT SHOULDER: ICD-10-CM

## 2024-05-14 DIAGNOSIS — M79.672 FOOT PAIN, LEFT: ICD-10-CM

## 2024-05-14 DIAGNOSIS — M54.12 CERVICAL RADICULOPATHY: Primary | ICD-10-CM

## 2024-05-14 PROCEDURE — 73030 X-RAY EXAM OF SHOULDER: CPT

## 2024-05-14 PROCEDURE — 73630 X-RAY EXAM OF FOOT: CPT

## 2024-05-14 PROCEDURE — 99213 OFFICE O/P EST LOW 20 MIN: CPT | Performed by: FAMILY MEDICINE

## 2024-05-14 RX ORDER — CYCLOBENZAPRINE HCL 10 MG
10 TABLET ORAL
Qty: 21 TABLET | Refills: 0 | Status: SHIPPED | OUTPATIENT
Start: 2024-05-14 | End: 2024-06-04

## 2024-05-14 RX ORDER — PREDNISONE 20 MG/1
TABLET ORAL
Qty: 32 TABLET | Refills: 0 | Status: SHIPPED | OUTPATIENT
Start: 2024-05-14

## 2024-05-14 NOTE — PROGRESS NOTES
Assessment/Plan:    1. Cervical radiculopathy  -     predniSONE 20 mg tablet; 4 tabs for three days, 3 tabs for three days, 2 tabs for three days, 1 tab for three days, 1/2 tab for 4 days  -     cyclobenzaprine (FLEXERIL) 10 mg tablet; Take 1 tablet (10 mg total) by mouth daily at bedtime for 21 days  -     Ambulatory Referral to Physical Therapy; Future    2. Acute pain of left shoulder  -     XR shoulder 2+ vw left; Future; Expected date: 05/14/2024    Will treat with meds and start theraspy.  If pt fails therapy she can be seen again and we can order an MRI of c spine  Pt did ask about imaging for neckj        There are no Patient Instructions on file for this visit.    No follow-ups on file.    Subjective:      Patient ID: Karly Howell is a 48 y.o. female.    Chief Complaint   Patient presents with   • Shoulder Pain     Started in patients neck and worked its way to the shoulder   L side, states hand feels funny   HK CMA        Pt is sched for a same day appt for back and shoulder pain  Pt states she states she has bulging discs in her neck - many years ago  They do spasm on her.  Last time it haoppened was 2019  6 weeks ago happened again was bad but then passed.  Thursday it came back  States it gets bad she can't turn her head and drive  States now its spreading down her amr and her shoulder.  Some symptoms in her hand  Deep ache  Hurts  Hard to sleep          The following portions of the patient's history were reviewed and updated as appropriate: allergies, current medications, past family history, past medical history, past social history, past surgical history and problem list.    Review of Systems   Musculoskeletal:  Positive for arthralgias, myalgias, neck pain and neck stiffness.         Current Outpatient Medications   Medication Sig Dispense Refill   • Aimovig 140 MG/ML SOAJ ADMINISTER 1 ML UNDER THE SKIN EVERY 4 WEEKS     • carBAMazepine (CARBATROL) 300 MG 12 hr capsule 300 mg every 12 (twelve)  "hours   1   • cyclobenzaprine (FLEXERIL) 10 mg tablet Take 1 tablet (10 mg total) by mouth daily at bedtime for 21 days 21 tablet 0   • ibuprofen (MOTRIN) 200 mg tablet Take 200 mg by mouth every 6 (six) hours as needed for mild pain      • predniSONE 20 mg tablet 4 tabs for three days, 3 tabs for three days, 2 tabs for three days, 1 tab for three days, 1/2 tab for 4 days 32 tablet 0   • rosuvastatin (CRESTOR) 20 MG tablet Take 1 tablet (20 mg total) by mouth daily 90 tablet 0   • SUMAtriptan (IMITREX) 100 mg tablet Take 1 tablet (100 mg total) by mouth once as needed for migraine for up to 60 doses 30 tablet 0   • valACYclovir (VALTREX) 500 mg tablet 500 mg daily As needed  2   • benzonatate (TESSALON) 200 MG capsule Take 1 capsule (200 mg total) by mouth 3 (three) times a day as needed for cough (Patient not taking: Reported on 5/3/2024) 30 capsule 0   • LORazepam (ATIVAN) 2 mg tablet TAKE 1 TABLET BY MOUTH 20 MINUTES PRIOR TO SCHEDULED MRI FOR SEDATION (Patient not taking: Reported on 5/3/2024)     • methylPREDNISolone 4 MG tablet therapy pack Use as directed on package 21 each 0   • predniSONE 10 mg tablet  (Patient not taking: Reported on 5/3/2024)       No current facility-administered medications for this visit.       Objective:    /70   Pulse 98   Temp 97.7 °F (36.5 °C)   Resp 18   Ht 5' 1\" (1.549 m)   Wt 78.3 kg (172 lb 9.6 oz)   LMP  (LMP Unknown)   BMI 32.61 kg/m²        Physical Exam  Musculoskeletal:      Comments: Creps in left shoulder exam  PVMS c psine Frank Lombardi, DO  "

## 2024-05-19 ENCOUNTER — HOSPITAL ENCOUNTER (EMERGENCY)
Facility: HOSPITAL | Age: 49
Discharge: HOME | End: 2024-05-19
Attending: EMERGENCY MEDICINE
Payer: COMMERCIAL

## 2024-05-19 ENCOUNTER — APPOINTMENT (EMERGENCY)
Dept: RADIOLOGY | Facility: HOSPITAL | Age: 49
End: 2024-05-19
Payer: COMMERCIAL

## 2024-05-19 VITALS
DIASTOLIC BLOOD PRESSURE: 87 MMHG | RESPIRATION RATE: 20 BRPM | OXYGEN SATURATION: 97 % | BODY MASS INDEX: 27.51 KG/M2 | TEMPERATURE: 99.4 F | HEART RATE: 98 BPM | SYSTOLIC BLOOD PRESSURE: 166 MMHG | WEIGHT: 145.6 LBS

## 2024-05-19 DIAGNOSIS — M54.13 RADICULOPATHY OF CERVICOTHORACIC REGION: Primary | ICD-10-CM

## 2024-05-19 LAB
ALBUMIN SERPL BCP-MCNC: 3.7 G/DL (ref 3.5–5)
ALP SERPL-CCNC: 65 U/L (ref 34–104)
ALT SERPL W P-5'-P-CCNC: 11 U/L (ref 7–52)
ANION GAP SERPL CALCULATED.3IONS-SCNC: 6 MMOL/L (ref 4–13)
AST SERPL W P-5'-P-CCNC: 12 U/L (ref 13–39)
BASOPHILS # BLD AUTO: 0.06 THOUSANDS/ÂΜL (ref 0–0.1)
BASOPHILS NFR BLD AUTO: 1 % (ref 0–1)
BILIRUB SERPL-MCNC: 0.28 MG/DL (ref 0.2–1)
BUN SERPL-MCNC: 16 MG/DL (ref 5–25)
CALCIUM SERPL-MCNC: 8.4 MG/DL (ref 8.4–10.2)
CHLORIDE SERPL-SCNC: 105 MMOL/L (ref 96–108)
CO2 SERPL-SCNC: 27 MMOL/L (ref 21–32)
CREAT SERPL-MCNC: 0.71 MG/DL (ref 0.6–1.3)
CRP SERPL QL: 2.6 MG/L
EOSINOPHIL # BLD AUTO: 0.02 THOUSAND/ÂΜL (ref 0–0.61)
EOSINOPHIL NFR BLD AUTO: 0 % (ref 0–6)
ERYTHROCYTE [DISTWIDTH] IN BLOOD BY AUTOMATED COUNT: 12 % (ref 11.6–15.1)
GFR SERPL CREATININE-BSD FRML MDRD: 101 ML/MIN/1.73SQ M
GLUCOSE SERPL-MCNC: 100 MG/DL (ref 65–140)
HCT VFR BLD AUTO: 38.4 % (ref 34.8–46.1)
HGB BLD-MCNC: 12.9 G/DL (ref 11.5–15.4)
IMM GRANULOCYTES # BLD AUTO: 0.03 THOUSAND/UL (ref 0–0.2)
IMM GRANULOCYTES NFR BLD AUTO: 0 % (ref 0–2)
LYMPHOCYTES # BLD AUTO: 2.57 THOUSANDS/ÂΜL (ref 0.6–4.47)
LYMPHOCYTES NFR BLD AUTO: 31 % (ref 14–44)
MCH RBC QN AUTO: 31.5 PG (ref 26.8–34.3)
MCHC RBC AUTO-ENTMCNC: 33.6 G/DL (ref 31.4–37.4)
MCV RBC AUTO: 94 FL (ref 82–98)
MONOCYTES # BLD AUTO: 0.44 THOUSAND/ÂΜL (ref 0.17–1.22)
MONOCYTES NFR BLD AUTO: 5 % (ref 4–12)
NEUTROPHILS # BLD AUTO: 5.32 THOUSANDS/ÂΜL (ref 1.85–7.62)
NEUTS SEG NFR BLD AUTO: 63 % (ref 43–75)
NRBC BLD AUTO-RTO: 0 /100 WBCS
PLATELET # BLD AUTO: 248 THOUSANDS/UL (ref 149–390)
PMV BLD AUTO: 8.9 FL (ref 8.9–12.7)
POTASSIUM SERPL-SCNC: 3.6 MMOL/L (ref 3.5–5.3)
PROCALCITONIN SERPL-MCNC: <0.05 NG/ML
PROT SERPL-MCNC: 5.8 G/DL (ref 6.4–8.4)
RBC # BLD AUTO: 4.09 MILLION/UL (ref 3.81–5.12)
SODIUM SERPL-SCNC: 138 MMOL/L (ref 135–147)
WBC # BLD AUTO: 8.44 THOUSAND/UL (ref 4.31–10.16)

## 2024-05-19 PROCEDURE — 86140 C-REACTIVE PROTEIN: CPT | Performed by: EMERGENCY MEDICINE

## 2024-05-19 PROCEDURE — 85025 COMPLETE CBC W/AUTO DIFF WBC: CPT | Performed by: EMERGENCY MEDICINE

## 2024-05-19 PROCEDURE — 36415 COLL VENOUS BLD VENIPUNCTURE: CPT | Performed by: EMERGENCY MEDICINE

## 2024-05-19 PROCEDURE — 80053 COMPREHEN METABOLIC PANEL: CPT | Performed by: EMERGENCY MEDICINE

## 2024-05-19 PROCEDURE — 99284 EMERGENCY DEPT VISIT MOD MDM: CPT | Performed by: EMERGENCY MEDICINE

## 2024-05-19 PROCEDURE — 99284 EMERGENCY DEPT VISIT MOD MDM: CPT

## 2024-05-19 PROCEDURE — 72125 CT NECK SPINE W/O DYE: CPT

## 2024-05-19 PROCEDURE — 84145 PROCALCITONIN (PCT): CPT | Performed by: EMERGENCY MEDICINE

## 2024-05-19 RX ORDER — METHOCARBAMOL 500 MG/1
500 TABLET, FILM COATED ORAL ONCE
Status: COMPLETED | OUTPATIENT
Start: 2024-05-19 | End: 2024-05-19

## 2024-05-19 RX ORDER — TRAMADOL HYDROCHLORIDE 50 MG/1
50 TABLET ORAL ONCE
Status: COMPLETED | OUTPATIENT
Start: 2024-05-19 | End: 2024-05-19

## 2024-05-19 RX ORDER — METHOCARBAMOL 500 MG/1
500 TABLET, FILM COATED ORAL 2 TIMES DAILY
Qty: 20 TABLET | Refills: 0 | Status: SHIPPED | OUTPATIENT
Start: 2024-05-19

## 2024-05-19 RX ADMIN — TRAMADOL HYDROCHLORIDE 50 MG: 50 TABLET, FILM COATED ORAL at 19:12

## 2024-05-19 RX ADMIN — METHOCARBAMOL TABLETS 500 MG: 500 TABLET, COATED ORAL at 19:12

## 2024-05-19 NOTE — Clinical Note
Karly Howell was seen and treated in our emergency department on 5/19/2024.                Diagnosis:     Karly  may return to work on return date.    She may return on this date: 05/22/2024         If you have any questions or concerns, please don't hesitate to call.      Ramana Donahue, DO    ______________________________           _______________          _______________  Hospital Representative                              Date                                Time

## 2024-05-19 NOTE — ED PROVIDER NOTES
History  Chief Complaint   Patient presents with    Neck Pain     States seen by her PCP on  and started on Prednisone and muscle relaxant for neck, left shoulder, left back, left axillary, left arm pain. Has appointment with ortho tomorrow. States distant past hx of bulging discs in neck. Tearful. Pain is a 10/10 at times.     Arm Pain     48-year-old female presents the ED states that she has had pain in the back of her neck rating into the left shoulder down her arm.  She states she has had it for a few days to close 2 to 2 weeks.  States she was seen by her PCP who ordered prednisone and Flexeril and she states that has done nothing.  She has been trying Advil today and that does not seem to be doing anything.  No fevers chills no known trauma or injury.  No other complaints        Prior to Admission Medications   Prescriptions Last Dose Informant Patient Reported? Taking?   Aimovig 140 MG/ML SOAJ  Self Yes No   Sig: ADMINISTER 1 ML UNDER THE SKIN EVERY 4 WEEKS   LORazepam (ATIVAN) 2 mg tablet   Yes No   Sig: TAKE 1 TABLET BY MOUTH 20 MINUTES PRIOR TO SCHEDULED MRI FOR SEDATION   Patient not taking: Reported on 5/3/2024   SUMAtriptan (IMITREX) 100 mg tablet   No No   Sig: Take 1 tablet (100 mg total) by mouth once as needed for migraine for up to 60 doses   benzonatate (TESSALON) 200 MG capsule   No No   Sig: Take 1 capsule (200 mg total) by mouth 3 (three) times a day as needed for cough   Patient not taking: Reported on 5/3/2024   carBAMazepine (CARBATROL) 300 MG 12 hr capsule  Self Yes No   Si mg every 12 (twelve) hours    cyclobenzaprine (FLEXERIL) 10 mg tablet   No No   Sig: Take 1 tablet (10 mg total) by mouth daily at bedtime for 21 days   ibuprofen (MOTRIN) 200 mg tablet  Self Yes No   Sig: Take 200 mg by mouth every 6 (six) hours as needed for mild pain    methylPREDNISolone 4 MG tablet therapy pack   No No   Sig: Use as directed on package   predniSONE 10 mg tablet   Yes No   Patient not  taking: Reported on 5/3/2024   predniSONE 20 mg tablet   No No   Si tabs for three days, 3 tabs for three days, 2 tabs for three days, 1 tab for three days, 1/2 tab for 4 days   rosuvastatin (CRESTOR) 20 MG tablet   No No   Sig: Take 1 tablet (20 mg total) by mouth daily   valACYclovir (VALTREX) 500 mg tablet  Self Yes No   Si mg daily As needed      Facility-Administered Medications: None       Past Medical History:   Diagnosis Date    Cervical radiculopathy     RESOLVED: [id number] buldging disc    Hyperlipidemia     Kidney stone     Migraines     Seizure (HCC)     age 24       Past Surgical History:   Procedure Laterality Date    ADENOIDECTOMY      COLONOSCOPY      LITHOTRIPSY      RENAL    OVARIAN CYST REMOVAL      [place] HYSTEROSCOPY BX ENDOMETRIUM&/POLYPC W/WO D&C N/A 10/28/2019    Procedure: OPERATIVE HYSTEROSCOPY (MYOSURE),POLYPECTOMY,D AND C;  Surgeon: Roel Rothman MD;  Location: [facility];  Service: Gynecology       Family History   Problem Relation Age of Onset    Multiple sclerosis Mother     Colon cancer Maternal Grandmother 70    Rheum arthritis Maternal Grandmother     Arthritis Family     Colon cancer Family     Osteoporosis Family     Anxiety disorder Father     Chromosomal disorder Cousin     No Known Problems Sister     No Known Problems Daughter     Skin cancer Maternal Grandfather 65        not sure of exact age.    No Known Problems Paternal Grandmother     No Known Problems Paternal Grandfather     No Known Problems Daughter     No Known Problems Son     No Known Problems Maternal Aunt     No Known Problems Maternal Aunt     No Known Problems Paternal Aunt      I have reviewed and agree with the history as documented.    E-Cigarette/Vaping    E-Cigarette Use Never User      E-Cigarette/Vaping Substances    Nicotine No     THC No     CBD No     Flavoring No     Other No     Unknown No      Social History     Tobacco Use    Smoking status: Former     Current packs/day: 0.00      Average packs/day: 1 pack/day for 20.0 years (20.0 ttl pk-yrs)     Types: Cigarettes     Start date: 6/15/1992     Quit date: 2007     Years since quittin.0     Passive exposure: Never    Smokeless tobacco: Never   Vaping Use    Vaping status: Never Used   Substance Use Topics    Alcohol use: Yes     Comment: rare    Drug use: Never       Review of Systems   Constitutional:  Negative for activity change, chills, diaphoresis and fever.   HENT:  Negative for congestion, ear pain, nosebleeds, sore throat, trouble swallowing and voice change.    Eyes:  Negative for pain, discharge and redness.   Respiratory:  Negative for apnea, cough, choking, shortness of breath, wheezing and stridor.    Cardiovascular:  Negative for chest pain and palpitations.   Gastrointestinal:  Negative for abdominal distention, abdominal pain, constipation, diarrhea, nausea and vomiting.   Endocrine: Negative for polydipsia.   Genitourinary:  Negative for difficulty urinating, dysuria, flank pain, frequency, hematuria and urgency.   Musculoskeletal:  Positive for back pain, myalgias and neck pain. Negative for gait problem, joint swelling and neck stiffness.   Skin:  Negative for pallor and rash.   Neurological:  Negative for dizziness, tremors, syncope, speech difficulty, weakness, numbness and headaches.   Hematological:  Negative for adenopathy.   Psychiatric/Behavioral:  Negative for confusion, hallucinations, self-injury and suicidal ideas. The patient is not nervous/anxious.        Physical Exam  Physical Exam  Vitals and nursing note reviewed.   Constitutional:       General: She is not in acute distress.     Appearance: She is well-developed. She is not diaphoretic.   HENT:      Head: Normocephalic and atraumatic.      Right Ear: External ear normal.      Left Ear: External ear normal.      Nose: Nose normal.   Eyes:      Conjunctiva/sclera: Conjunctivae normal.      Pupils: Pupils are equal, round, and reactive to light.    Cardiovascular:      Rate and Rhythm: Normal rate and regular rhythm.      Heart sounds: Normal heart sounds.   Pulmonary:      Effort: Pulmonary effort is normal.      Breath sounds: Normal breath sounds.   Abdominal:      General: Bowel sounds are normal.      Palpations: Abdomen is soft.      Tenderness: There is abdominal tenderness.      Comments: Diffuse tenderness   Musculoskeletal:         General: Normal range of motion.      Cervical back: Normal range of motion and neck supple.   Skin:     General: Skin is warm and dry.   Neurological:      Mental Status: She is alert and oriented to person, place, and time.      Deep Tendon Reflexes: Reflexes are normal and symmetric.         Vital Signs  ED Triage Vitals [05/19/24 1847]   Temperature Pulse Respirations Blood Pressure SpO2   99.4 °F (37.4 °C) 98 20 166/87 97 %      Temp Source Heart Rate Source Patient Position - Orthostatic VS BP Location FiO2 (%)   Tympanic Monitor Sitting Right arm --      Pain Score       10 - Worst Possible Pain           Vitals:    05/19/24 1847   BP: 166/87   Pulse: 98   Patient Position - Orthostatic VS: Sitting         Visual Acuity      ED Medications  Medications   methocarbamol (ROBAXIN) tablet 500 mg (has no administration in time range)   traMADol (ULTRAM) tablet 50 mg (has no administration in time range)       Diagnostic Studies  Results Reviewed       Procedure Component Value Units Date/Time    CBC and differential [[id number]]     Lab Status: No result Specimen: Blood     Comprehensive metabolic panel [[id number]]     Lab Status: No result Specimen: Blood     C-reactive protein [[id number]]     Lab Status: No result Specimen: Blood     Procalcitonin [[id number]]     Lab Status: No result Specimen: Blood                    CT cervical spine without contrast    (Results Pending)              Procedures  Procedures         ED Course                               SBIRT 22yo+      Flowsheet Row Most Recent Value   Initial  Alcohol Screen: US AUDIT-C     1. How often do you have a drink containing alcohol? 0 Filed at: 05/19/2024 1849   Audit-C Score 0 Filed at: 05/19/2024 1849   NERI: How many times in the past year have you...    Used an illegal drug or used a prescription medication for non-medical reasons? Never Filed at: 05/19/2024 1849                      Medical Decision Making  Amount and/or Complexity of Data Reviewed  Labs: ordered.  Radiology: ordered.    Risk  Prescription drug management.             Disposition  Final diagnoses:   None     ED Disposition       None          Follow-up Information    None         Patient's Medications   Discharge Prescriptions    No medications on file       No discharge procedures on file.    PDMP Review       None            ED Provider  Electronically Signed by             Ramana Donahue DO  05/19/24 8132

## 2024-05-20 ENCOUNTER — TELEPHONE (OUTPATIENT)
Dept: FAMILY MEDICINE CLINIC | Facility: CLINIC | Age: 49
End: 2024-05-20

## 2024-05-20 ENCOUNTER — OFFICE VISIT (OUTPATIENT)
Dept: OBGYN CLINIC | Facility: CLINIC | Age: 49
End: 2024-05-20
Payer: COMMERCIAL

## 2024-05-20 VITALS
WEIGHT: 145 LBS | DIASTOLIC BLOOD PRESSURE: 86 MMHG | BODY MASS INDEX: 27.38 KG/M2 | HEART RATE: 98 BPM | SYSTOLIC BLOOD PRESSURE: 135 MMHG | HEIGHT: 61 IN

## 2024-05-20 DIAGNOSIS — M47.22 CERVICAL SPONDYLOSIS WITH RADICULOPATHY: Primary | ICD-10-CM

## 2024-05-20 PROCEDURE — 99213 OFFICE O/P EST LOW 20 MIN: CPT | Performed by: ORTHOPAEDIC SURGERY

## 2024-05-20 NOTE — LETTER
May 20, 2024     Patient: Karly Howell  YOB: 1975  Date of Visit: 5/20/2024      To Whom it May Concern:    Karly Howell is under my professional care. Karly was seen in my office on 5/20/2024. Karly can perform light duty limited to 8 hours a day for the next 2 weeks. No lifting, pushing, or pulling over that time. The patient can return to full duty after that time and then will follow up with her Orthopedic Surgeon in 6 weeks.    If you have any questions or concerns, please don't hesitate to call.         Sincerely,          Stephen Tompkins MD        CC: No Recipients

## 2024-05-20 NOTE — TELEPHONE ENCOUNTER
Please call pt the shoulder x ray diod not show any acute pathology, which is good.  Pt alos seem sto have hgad a foot x ray that shows a likely old avulsion fracture.  I do not recall ordering this study nor can I find whenj it was ordered in the chart.  Pt has seen podiatry so may have been ordered then?

## 2024-05-20 NOTE — PROGRESS NOTES
Orthopedic Sports Medicine New Patient Visit     Assesment:   48 y.o. female with cervical spondylosis with radiculopathy    Plan:    We had a long discussion about the diagnosis and treatment plan. The patient does demonstrate adequate, symmetric strength throughout the left upper extremity. I recommend she keep her scheduled PT to work on her degenerative and nerve-related symptoms. She can also continue using heat and Advil as needed for pain. She can discontinue the oral steroid and muscle relaxers if she is not obtaining any relief from these. We reassured the patient that her symptoms are typical of this diagnosis and can improve with treatment interventions discussed without any further issues. However, if the patient's symptoms persist or worsen, we can plan to follow up with Karly in 6 weeks for consideration of cervical spine MRI and referral to our Spine and Pain colleagues at that time.   A work note was provided for light duty over the next 2 weeks. All questions were addressed today.          History of Present Illness:    The patient is a 48 y.o., right hand dominant, female, presenting for initial evaluation of atraumatic neck pain with associated radicular symptoms for 1.5 weeks. The patient was lifting grocery bags at that time and felt neck pain with a shooting pain to the level of the left dorsal wrist. Pain stems from the neck, radiates to the nii-scapular and axilla, then goes down the arm. Pain seems to be positional and is exacerbated by neck extension and rotation. The patient also notes paresthesias in the hand, but not the fingers. She denies injury/trauma, weakness, or difficulty with fine motor movements. The patient has a history of muscle spasms about the neck, most recently 2 months ago, but she denies muscle spasms currently. The patient did present to her PCP for this initially and was provided Flexeril and Prednisone, which have not alleviated her pain so far. She has been using  heat and Advil as needed as well as sleeping in an inclined position. The patient did present to the ED for this persistent pain yesterday where CT was performed and the patient was provided Tramadol and Robaxin. She has not taken these yet. The patient was also advised to start PT, which she intends on starting tomorrow.      Shoulder Surgical History:  None    Past Medical, Social and Family History:  Past Medical History:   Diagnosis Date    Cervical radiculopathy     RESOLVED: [id number] buldging disc    Hyperlipidemia     Kidney stone     Migraines     Seizure (HCC)     age 24     Past Surgical History:   Procedure Laterality Date    ADENOIDECTOMY      COLONOSCOPY      LITHOTRIPSY  2006    RENAL    OVARIAN CYST REMOVAL  2012    [place] HYSTEROSCOPY BX ENDOMETRIUM&/POLYPC W/WO D&C N/A 10/28/2019    Procedure: OPERATIVE HYSTEROSCOPY (MYOSURE),POLYPECTOMY,D AND C;  Surgeon: Roel Rothman MD;  Location: [facility];  Service: Gynecology     Allergies   Allergen Reactions    Ciprofloxacin Lightheadedness     dizzy    Phenytoin Rash     Reaction Date: 05Oct2010;      Current Outpatient Medications on File Prior to Visit   Medication Sig Dispense Refill    Aimovig 140 MG/ML SOAJ ADMINISTER 1 ML UNDER THE SKIN EVERY 4 WEEKS      benzonatate (TESSALON) 200 MG capsule Take 1 capsule (200 mg total) by mouth 3 (three) times a day as needed for cough (Patient not taking: Reported on 5/3/2024) 30 capsule 0    carBAMazepine (CARBATROL) 300 MG 12 hr capsule 300 mg every 12 (twelve) hours   1    cyclobenzaprine (FLEXERIL) 10 mg tablet Take 1 tablet (10 mg total) by mouth daily at bedtime for 21 days 21 tablet 0    ibuprofen (MOTRIN) 200 mg tablet Take 200 mg by mouth every 6 (six) hours as needed for mild pain       LORazepam (ATIVAN) 2 mg tablet TAKE 1 TABLET BY MOUTH 20 MINUTES PRIOR TO SCHEDULED MRI FOR SEDATION (Patient not taking: Reported on 5/3/2024)      methocarbamol (ROBAXIN) 500 mg tablet Take 1 tablet (500 mg total) by  mouth 2 (two) times a day 20 tablet 0    methylPREDNISolone 4 MG tablet therapy pack Use as directed on package 21 each 0    predniSONE 10 mg tablet  (Patient not taking: Reported on 5/3/2024)      predniSONE 20 mg tablet 4 tabs for three days, 3 tabs for three days, 2 tabs for three days, 1 tab for three days, 1/2 tab for 4 days 32 tablet 0    rosuvastatin (CRESTOR) 20 MG tablet Take 1 tablet (20 mg total) by mouth daily 90 tablet 0    SUMAtriptan (IMITREX) 100 mg tablet Take 1 tablet (100 mg total) by mouth once as needed for migraine for up to 60 doses 30 tablet 0    valACYclovir (VALTREX) 500 mg tablet 500 mg daily As needed  2     Current Facility-Administered Medications on File Prior to Visit   Medication Dose Route Frequency Provider Last Rate Last Admin    [COMPLETED] methocarbamol (ROBAXIN) tablet 500 mg  500 mg Oral Once [employer], DO   500 mg at 24    [COMPLETED] traMADol (ULTRAM) tablet 50 mg  50 mg Oral Once [employer], DO   50 mg at 24     Social History     Socioeconomic History    Marital status: /Civil Union     Spouse name: Not on file    Number of children: Not on file    Years of education: Not on file    Highest education level: Not on file   Occupational History    Not on file   Tobacco Use    Smoking status: Former     Current packs/day: 0.00     Average packs/day: 1 pack/day for 20.0 years (20.0 ttl pk-yrs)     Types: Cigarettes     Start date: 6/15/1992     Quit date: 2007     Years since quittin.0     Passive exposure: Never    Smokeless tobacco: Never   Vaping Use    Vaping status: Never Used   Substance and Sexual Activity    Alcohol use: Yes     Comment: rare    Drug use: Never    Sexual activity: Not on file   Other Topics Concern    Not on file   Social History Narrative    Daily coffee consumption    Daily tea consumption     as per all scripts    Lack of exercise    Sleeps 6-7 hours a day      Social Determinants of Health  "    Financial Resource Strain: Not on file   Food Insecurity: Not on file   Transportation Needs: Not on file   Physical Activity: Not on file   Stress: Not on file   Social Connections: Not on file   Intimate Partner Violence: Not on file   Housing Stability: Not on file         I have reviewed the past medical, surgical, social and family history, medications and allergies as documented in the EMR.    Review of systems: ROS is negative other than that noted in the HPI.  Constitutional: Negative for fatigue and fever.   HENT: Negative for sore throat.    Respiratory: Negative for shortness of breath.    Cardiovascular: Negative for chest pain.   Gastrointestinal: Negative for abdominal pain.   Endocrine: Negative for cold intolerance and heat intolerance.   Genitourinary: Negative for flank pain.   Musculoskeletal: Negative for back pain.   Skin: Negative for rash.   Allergic/Immunologic: Negative for immunocompromised state.   Neurological: Negative for dizziness.   Psychiatric/Behavioral: Negative for agitation.      Physical Exam:    Height 5' 1\" (1.549 m), weight 65.8 kg (145 lb), not currently breastfeeding.    General/Constitutional: NAD, well developed, well nourished  HENT: Normocephalic, atraumatic  CV: Intact distal pulses, regular rate  Resp: No respiratory distress or labored breathing  Abdomen: soft, nondistended, non tender   Lymphatic: No lymphadenopathy palpated  Neuro: Alert and Oriented x 3, no focal deficits  Psych: Normal mood, normal affect  Skin: Warm, dry, no rashes, no erythema      Shoulder Exam:      Inspection: No ecchymosis, edema, or deformity. No visualized wounds or skin lesions   Palpation: No AC joint tenderness. Mild bicipital groove tenderness. No cervical midline or nii-scapular muscle tenderness.   Active Motion:       FF: 160, symmetric        ER: 80, symmetric        IR: T12, symmetric  Strength: 5/5 empty can, 5/5 ER,  5/5 IR, 5/5 elbow flexion/extension  Sensory - SILT in " the Radial / Ulnar / Median / Axillary nerve distributions  Motor - AIN / PIN / Radial / Ulnar / Median / Axillary motor nerves in tact  Palpable Radial pulse  Cap refill <2secs in all digits    Unable to elicit radicular symptoms with neck rotation and flexion/extension today  - Lift Off Test  - Spurling  - Yann's test        Imaging    I reviewed and interpreted X-rays of the left shoulder which show no acute osseous abnormalities or significant degenerative changes. Humerus is well-centered on the glenoid.     I reviewed and interpreted CT of the cervical spine, which shows FINDINGS:     ALIGNMENT:  There is straightening of normal cervical lordosis.  No subluxation or compression deformity.     VERTEBRAE:  No fracture.     DEGENERATIVE CHANGES:     At C5-C6, there is moderate to space narrowing. Central disc osteophyte complex with uncinate hypertrophy resulting in minimal canal stenosis with mild bilateral foraminal stenosis.     At C6-C7, there is moderate to space narrowing. Broad-based disc osteophyte complex and uncinate hypertrophy resulting in mild canal and mild bilateral foraminal stenosis

## 2024-05-20 NOTE — LETTER
May 20, 2024     Patient: Karly Howell  YOB: 1975  Date of Visit: 5/20/2024      To Whom it May Concern:    Karly Howell is under my professional care. Karly was seen in my office on 5/20/2024. Karly can remain out of work today and tomorrow. Starting 5/22/24, she can perform light duty limited to 8 hours a day for the next 2 weeks. No lifting, pushing, or pulling over that time. The patient can return to full duty after that time and then will follow up with her Orthopedic Surgeon in 6 weeks.     If you have any questions or concerns, please don't hesitate to call.         Sincerely,          Stephen Tompkins MD        CC: No Recipients

## 2024-05-21 ENCOUNTER — EVALUATION (OUTPATIENT)
Dept: PHYSICAL THERAPY | Facility: CLINIC | Age: 49
End: 2024-05-21
Payer: COMMERCIAL

## 2024-05-21 DIAGNOSIS — M54.2 CERVICAL PAIN (NECK): ICD-10-CM

## 2024-05-21 DIAGNOSIS — M54.12 CERVICAL RADICULOPATHY: Primary | ICD-10-CM

## 2024-05-21 PROCEDURE — 97162 PT EVAL MOD COMPLEX 30 MIN: CPT

## 2024-05-21 NOTE — PROGRESS NOTES
PT Evaluation   Today's date: 2024  Patient name: Karly Howell  : 1975  MRN: [id number]  Referring provider: Lombardi, Frank, DO  Dx:   Encounter Diagnosis     ICD-10-CM    1. Cervical radiculopathy  M54.12 Ambulatory Referral to Physical Therapy      2. Cervical pain (neck)  M54.2               Assessment  Assessment details: Patient is a 48 y.o. Female who presents with referring diagnosis of Cervical spondylosis with radiculopathy. Patient's greatest concern is worry over not knowing what's wrong and fear of not being able to keep active.    Primary movement impairment diagnosis of neck pain with radicular symptoms resulting in pathoanatomical symptoms of limited mobility, impaired physical strength, impaired force coupling, and pain limiting function. The aforementioned impairments have limited the patient's ability to drive, perform ADLs, and work without restriction. No further referral appears necessary at this time based upon examination results    Patient education performed during today's session included: POC and HEP including: chin tucks, cervical retraction, and median nerve glides    Primary movement impairments:  1) Neck pain with radicular symptoms   2) Limited mobility           Impairments: Activity intolerance, Impaired physical strength, Lacks appropriate HEP, Poor posture, Poor body mechanics, Pain with function, Scapular dyskinesis, Abnormal movement, and Abnormal muscle firing  Understanding of Dx/Px/POC: Good  Prognosis: Good   Positive prognostic factors: age, motivation, active adult      Patient verbalized understanding of POC.         Please contact me if you have any questions or recommendations. Thank you for the referral and the opportunity to share in Karly Howell's care.        Plan  Plan details: 2x per week for 6 weeks     Patient would benefit from: PT Eval and Skilled PT  Planned modality  interventions: Cryotherapy and Traction  Planned therapy interventions: Body mechanics training, Dry Needling, Functional ROM exercises, HEP, Joint mobilization, Manual therapy, Motor coordination training, Neuromuscular re-education, Patient education, Postural training, Strengthening, Stretching, Therapeutic activities, Therapeutic exercises, and Activity modification  Frequency: 2x/wk  Duration in weeks: 8  Plan of Care beginning date: 5/21/2024  Plan of Care expiration date: 8 weeks - 7/16/24  Treatment plan discussed with: Patient       Goals  Short Term Goals (4 weeks):    - Patient will be independent in basic HEP 2-3 weeks  - Patient will report >50% reduction in pain  - Patient will demonstrate >1/3 improvement in MMT grade as applicable  - Patient will work a full 8 hour day with a reported pain of <4/10  - Patient will be able to carry 10lb weight without reported increase in pain    Long Term Goals (6 weeks):  - Patient will be independent in a comprehensive home exercise program  - Patient FOTO score will improve by 9 points  - Patient will self-report >75% improvement in function  - Patient will be independent with all ADLs without a reported increase in pain  - Patient will be able to carry 10lb weight without reported increase in pain      Subjective    History of Present Illness  - Mechanism of injury: Approximately 2 weeks ago while carrying large grocery bags, patient had neck pain that started to radiate down the left arm into left hand and across left periscapular region. She states the pain comes on with certain movements (cervical extension and cervical rotation bilaterally). She saw PCP last week for symptoms and he prescribed PT and Prednisone for future visit. On 5/19, patient's neck pain was so bad, she went to the ER. At the ER, she received a CT scan which noted moderate degenerative changes in C5-C6 and C6-C7. She had an appointment with orthopedic surgeon on Monday to POC.   - Primary  AD: none   - Assist level at home:    - Prior level of function: independent with ADLs, walks dog, , grocery shopping       Pain  - Current pain ratin/10  - At best pain ratin/10  - At worst pain rating: 10/10  - Location: neck down LUE:   - Aggravating factors: sleeping (cannot sleep on side and stomach) lays on back with incline pillows     Social Support  - Steps to enter house: 6  - Stairs in house: 6   - Bedroom/bathroom set up: 2nd floor   - DME available: none   - Lives in: split level home   - Lives with:  and children       Objective     Red Flag Screening  - rest/night pain     Postural Assessment  -Posture in Sitting: poor   -Postural Correction: centralization     Sensation  - Light touch: slightly diminished on LUE  - Reflexes:   Left: C5: 2+    Right: C5: 2+    - Upper Motor Neuron Signs: WNL    UE MMT  LEFT   RIGHT  - Shoulder Shrug (C4): 4/5  4/5  - Shoulder Abduction (C5):  4+/5  4-/5   - Elbow Flexion (C6):  4+/5  4/5  - Elbow Extension (C7): 4+/5  4-/5  - Thumb Extension (C8): 4+/5  4+/5  - Finger Adduction (T1):  4+/5  4+/5        Cervical Spine Range of Motion  Flexion:  Moderately limited  without pain  Extension:  Moderately limited  with pain  Lateral Flexion - Left:  Minimally limited  without pain  Lateral Flexion - Right:  Minimally limited  without pain  Rotation - Left:  Minimally limited  without pain  Rotation - Right:  Minimally limited  without pain    Shoulder Range of Motion  Flexion: WNL b/l  Abduction: WNL b/l   External Rotation: C7 b/l  Internal Rotation: L2 b/l    Mechanical Assessment  In Standing:  - Retraction: Centralizing     Joint Play  OA: Normal  AA: Normal  C2: Normal  C3: Normal  C4: Normal  C5: Normal  C6: Hypomobile  C7: Hypomobile  CTJ: Hypomobile  Mid-Thoracic Spine: Hypomobile      Palpation  (+) TTP of CTJ; first rib       Diagnostic Tests Performed  Positive: Cervical Rotation Lateral Flexion (1st rib assessment), Spurling A,  and ULTT 1A (median nerve)  Negative: Alar Ligament and Sharp Jeremiah                 Insurance:  AMA/CMS Eval/ Re-eval POC expires FOTO Auth #/ Referral # Total units  Start date  Expiration date Extension  Visit limitation?  PT only or  PT+OT? Co-Insurance   [place]: CMS Eval 5/21/24   Auth is REQ  5/21    PT only none                                                                  Date 5/21              Units:  Used 1              Authed:  Remaining                      Date 5/21/24        Visit Number IE        Manual         Scap retraining          First rib mob          STM         Nerve gliding                   TherEx         SNAGs          Cat cow          Thread the needle          First rib self mob         Rows          Shoulder ext                            Neuro Re-Ed         Chin tucks  Supine 2x10         Scap retract x10         Scap depress          Median N. tensioners X10         SL shoulder abd          SL shoulder horizontal abd          Is Ts Ys                  TherAct         Patient education Posture, POC, and HEP                                            Gait Training                                    Modalities                      Precautions: h/o seizures (controlled by medication)  Past Medical History:   Diagnosis Date    Cervical radiculopathy     RESOLVED: [id number] buldging disc    Hyperlipidemia     Kidney stone     Migraines     Seizure (HCC)     age 24

## 2024-05-22 ENCOUNTER — APPOINTMENT (OUTPATIENT)
Dept: PHYSICAL THERAPY | Facility: CLINIC | Age: 49
End: 2024-05-22
Payer: COMMERCIAL

## 2024-05-23 ENCOUNTER — OFFICE VISIT (OUTPATIENT)
Dept: PHYSICAL THERAPY | Facility: CLINIC | Age: 49
End: 2024-05-23
Payer: COMMERCIAL

## 2024-05-23 DIAGNOSIS — M54.2 CERVICAL PAIN (NECK): ICD-10-CM

## 2024-05-23 DIAGNOSIS — M54.12 CERVICAL RADICULOPATHY: Primary | ICD-10-CM

## 2024-05-23 PROCEDURE — 97110 THERAPEUTIC EXERCISES: CPT

## 2024-05-23 PROCEDURE — 97140 MANUAL THERAPY 1/> REGIONS: CPT

## 2024-05-23 NOTE — PROGRESS NOTES
"Daily Note     Today's date: 2024  Patient name: Karly Howell  : 1975  MRN: [id number]  Referring provider: Lombardi, Frank, DO  Dx:   Encounter Diagnosis     ICD-10-CM    1. Cervical radiculopathy  M54.12       2. Cervical pain (neck)  M54.2           Start Time: 1805  Stop Time: 1845  Total time in clinic (min): 40 minutes    Subjective: Patient reports pain in L forearm and neck last night and today. States it was \"okay on Wednesday, but today has been the worst.\" She attempted HEP and reports the median nerve glides help, but the chin tucks are too painful on her neck      Objective: See treatment diary below      Assessment: Tolerated treatment fair. Noted tight L pec minor, provided STM and Pec release with horizontal abd. Assessed Thoracic spine with hypomobility in CTJ and upper thoracic segments. Patient cavitated with gr 4 T spine PA mobilizations and gr 5 CTJ. She stated that she felt \"looser\" after manuals. Educated patient on with T spine mobility exercises, which she tolerated well. Provided updated HEP for thoracic mobility. Patient demonstrated fatigue post treatment, exhibited good technique with therapeutic exercises, and would benefit from continued PT to maximize functional independence.       Plan: Continue per plan of care.      Insurance:  AMA/CMS Eval/ Re-eval POC expires FOTO Auth #/ Referral # Total units  Start date  Expiration date Extension  Visit limitation?  PT only or  PT+OT? Co-Insurance   [place]: CMS Eval 24   Auth is REQ      PT only none                                                                  Date              Units:  Used 1 2             Authed:  Remaining                      Date 24       Visit Number IE 2       Manual         Thoracic PA   Prone gr 4-5        CTJ   Backpack gr 5       STM  Pec minor release        Nerve gliding   Median 5\" x20                 TherEx         SNAGs          Cat cow   15x        Open books   20x  " "      Thoracic ext   10\"x10        Thread the needle   Attempted       Doorway stretch   10\"x10        Wall slides + lift off  10x       Rows          Shoulder ext                            Neuro Re-Ed         Chin tucks  Supine 2x10  Attempted p!       Scap retract x10         Scap depress          Median N. tensioners X10         SL shoulder abd          SL shoulder horizontal abd          Is Ts Ys                  TherAct         Patient education Posture, POC, and HEP                                            Gait Training                                    Modalities                      Precautions: h/o seizures (controlled by medication)  Past Medical History:   Diagnosis Date    Cervical radiculopathy     RESOLVED: [id number] buldging disc    Hyperlipidemia     Kidney stone     Migraines     Seizure (HCC)     age 24              "

## 2024-05-29 ENCOUNTER — OFFICE VISIT (OUTPATIENT)
Dept: PHYSICAL THERAPY | Facility: CLINIC | Age: 49
End: 2024-05-29
Payer: COMMERCIAL

## 2024-05-29 DIAGNOSIS — M54.12 CERVICAL RADICULOPATHY: Primary | ICD-10-CM

## 2024-05-29 DIAGNOSIS — M54.2 CERVICAL PAIN (NECK): ICD-10-CM

## 2024-05-29 PROCEDURE — 97110 THERAPEUTIC EXERCISES: CPT

## 2024-05-29 PROCEDURE — 97140 MANUAL THERAPY 1/> REGIONS: CPT

## 2024-05-29 PROCEDURE — 97530 THERAPEUTIC ACTIVITIES: CPT

## 2024-05-29 NOTE — PROGRESS NOTES
"Daily Note     Today's date: 2024  Patient name: Karly Howell  : 1975  MRN: [id number]  Referring provider: Lombardi, Frank, DO  Dx:   Encounter Diagnosis     ICD-10-CM    1. Cervical radiculopathy  M54.12       2. Cervical pain (neck)  M54.2                      Subjective: Patient reports significant pain throughout her Lt shoulder, armpit, and upper her scapula.      Objective: See treatment diary below      Assessment: Tolerated treatment fair. Based on patient's reports of significant pain, pivoted treatment to assess true shoulder involvement. Patient felt relief of symptoms with shoulder hang as well as with gr 1-2 shoulder mobs.  Instructed patient is shoulder isos to assess RTC involvement with both abduction and IR producing patient's concordant reports of Lt shoulder pain. Updated HEP accordingly and advised patient that we could try postural taping into retraction as she feels like her shoulder \"isn't sitting right and gets stuck\". Patient demonstrated fatigue post treatment, exhibited good technique with therapeutic exercises, and would benefit from continued PT to maximize functional independence.       Plan: Continue per plan of care.        Insurance:  AMA/CMS Eval/ Re-eval POC expires FOTO Auth #/ Referral # Total units  Start date  Expiration date Extension  Visit limitation?  PT only or  PT+OT? Co-Insurance   [place]: CMS Eval 24   Auth is REQ      PT only none                                                                  Date              Units:  Used 1 2             Authed:  Remaining                      Date 24      Visit Number IE 2 3      Manual         Thoracic PA   Prone gr 4-5        CTJ   Backpack gr 5 Manual distraction 3 x 30 sec      STM  Pec minor release        Nerve gliding   Median 5\" x20        Shoulder mobs   Assessment and gr 1-2 mobs 3 x 30 sec    Distraction 3 x 30 sec w/ oscillations               TherEx         SNAGs       " "   Cat cow   15x        Open books   20x        Thoracic ext   10\"x10        Thread the needle   Attempted       Doorway stretch   10\"x10        Wall slides + lift off  10x       Rows          Shoulder ext          Shoulder isos   All directions 10 x 5 sec (used as treat and diagnostic)  -- added to HEP      Shoulder hang   Stationary shoulder hang 3 x 30 sec               Neuro Re-Ed         Chin tucks  Supine 2x10  Attempted p!       Scap retract x10         Scap depress          Median N. tensioners X10         SL shoulder abd          SL shoulder horizontal abd          Is Ts Ys                  TherAct         Patient education Posture, POC, and HEP  Assess shoulder and discuss POC x 20 min                                          Gait Training                                    Modalities                      Precautions: h/o seizures (controlled by medication)  Past Medical History:   Diagnosis Date    Cervical radiculopathy     RESOLVED: [id number] buldging disc    Hyperlipidemia     Kidney stone     Migraines     Seizure (HCC)     age 24              "

## 2024-05-30 ENCOUNTER — APPOINTMENT (OUTPATIENT)
Dept: PHYSICAL THERAPY | Facility: CLINIC | Age: 49
End: 2024-05-30
Payer: COMMERCIAL

## 2024-05-31 ENCOUNTER — TELEPHONE (OUTPATIENT)
Dept: OBGYN CLINIC | Facility: CLINIC | Age: 49
End: 2024-05-31

## 2024-05-31 ENCOUNTER — OFFICE VISIT (OUTPATIENT)
Dept: OBGYN CLINIC | Facility: CLINIC | Age: 49
End: 2024-05-31
Payer: COMMERCIAL

## 2024-05-31 ENCOUNTER — OFFICE VISIT (OUTPATIENT)
Dept: PHYSICAL THERAPY | Facility: CLINIC | Age: 49
End: 2024-05-31
Payer: COMMERCIAL

## 2024-05-31 VITALS
HEIGHT: 61 IN | WEIGHT: 142 LBS | BODY MASS INDEX: 26.81 KG/M2 | SYSTOLIC BLOOD PRESSURE: 132 MMHG | DIASTOLIC BLOOD PRESSURE: 88 MMHG | HEART RATE: 87 BPM

## 2024-05-31 DIAGNOSIS — M54.2 MYOFASCIAL NECK PAIN: ICD-10-CM

## 2024-05-31 DIAGNOSIS — M75.82 ROTATOR CUFF TENDINITIS, LEFT: Primary | ICD-10-CM

## 2024-05-31 DIAGNOSIS — M54.12 CERVICAL RADICULOPATHY: Primary | ICD-10-CM

## 2024-05-31 DIAGNOSIS — M54.12 CERVICAL RADICULOPATHY: ICD-10-CM

## 2024-05-31 DIAGNOSIS — M54.2 CERVICAL PAIN (NECK): ICD-10-CM

## 2024-05-31 PROCEDURE — 99214 OFFICE O/P EST MOD 30 MIN: CPT | Performed by: ORTHOPAEDIC SURGERY

## 2024-05-31 PROCEDURE — 20610 DRAIN/INJ JOINT/BURSA W/O US: CPT | Performed by: ORTHOPAEDIC SURGERY

## 2024-05-31 PROCEDURE — 97110 THERAPEUTIC EXERCISES: CPT

## 2024-05-31 PROCEDURE — 97140 MANUAL THERAPY 1/> REGIONS: CPT

## 2024-05-31 RX ORDER — BUPIVACAINE HYDROCHLORIDE 5 MG/ML
4 INJECTION, SOLUTION PERINEURAL
Status: COMPLETED | OUTPATIENT
Start: 2024-05-31 | End: 2024-05-31

## 2024-05-31 RX ORDER — TRIAMCINOLONE ACETONIDE 40 MG/ML
40 INJECTION, SUSPENSION INTRA-ARTICULAR; INTRAMUSCULAR
Status: COMPLETED | OUTPATIENT
Start: 2024-05-31 | End: 2024-05-31

## 2024-05-31 RX ORDER — LORAZEPAM 2 MG/1
2 TABLET ORAL ONCE
Qty: 1 TABLET | Refills: 0 | Status: SHIPPED | OUTPATIENT
Start: 2024-05-31 | End: 2024-05-31

## 2024-05-31 RX ADMIN — BUPIVACAINE HYDROCHLORIDE 4 ML: 5 INJECTION, SOLUTION PERINEURAL at 08:15

## 2024-05-31 RX ADMIN — TRIAMCINOLONE ACETONIDE 40 MG: 40 INJECTION, SUSPENSION INTRA-ARTICULAR; INTRAMUSCULAR at 08:15

## 2024-05-31 NOTE — TELEPHONE ENCOUNTER
Patient is going for her MRI on 6/20/24   She would like to know if she can get something sent to the pharmacy to take prior to having the MRI done to keep her calm .

## 2024-05-31 NOTE — PROGRESS NOTES
Orthopedic Sports Medicine New Patient Visit     Assesment:   48 y.o. female with cervical spondylosis with radiculopathy, also with rotator cuff tendinitis    Plan:    I do believe the cervical spine continues to be the primary source of pain.  Her shoulder exam shows great strength and only mild pain with certain maneuvers.  We did discuss that it can be overlapping signs and symptoms between cervical pathology and shoulder pathology.  To help differentiate I did recommend a left subacromial injection which she agreed to and tolerated well.  Regardless of her response to this injection she continues to have significant neck pain and pain around the periscapular muscles that radiates into her arm.  This is worsening since starting physical therapy and she is not making any significant progress.  Based on her failure to progress and actually worsening with physical therapy I did recommend an MRI of her cervical spine at this point to guide further treatment.  After MRI is completed she can follow-up with spine and pain specialist for further treatment options.  I will see her back for her shoulder in 6 weeks.  I recommend she continue physical therapy for both the shoulder and the neck in the meantime.      History of Present Illness:    The patient is a 48 y.o., right hand dominant, female, following up for shoulder and neck pain.  At last visit we focused mainly on her neck and sent her for a course of physical therapy.  Since doing physical therapy her symptoms have significantly worsened.  She is now struggling with all ADLs as well as sleeping.  Pain is mostly along the neck periscapular muscles with radiation to the axilla and down the arm.  There is also some pain in the shoulder with certain positions as well.      Shoulder Surgical History:  None    Past Medical, Social and Family History:  Past Medical History:   Diagnosis Date    Cervical radiculopathy     RESOLVED: [id number] buldging disc     Hyperlipidemia     Kidney stone     Migraines     Seizure (HCC)     age 24     Past Surgical History:   Procedure Laterality Date    ADENOIDECTOMY      COLONOSCOPY      LITHOTRIPSY  2006    RENAL    OVARIAN CYST REMOVAL  2012    CT HYSTEROSCOPY BX ENDOMETRIUM&/POLYPC W/WO D&C N/A 10/28/2019    Procedure: OPERATIVE HYSTEROSCOPY (MYOSURE),POLYPECTOMY,D AND C;  Surgeon: Roel Rothman MD;  Location: [facility];  Service: Gynecology     Allergies   Allergen Reactions    Ciprofloxacin Lightheadedness     dizzy    Phenytoin Rash     Reaction Date: 05Oct2010;      Current Outpatient Medications on File Prior to Visit   Medication Sig Dispense Refill    Aimovig 140 MG/ML SOAJ ADMINISTER 1 ML UNDER THE SKIN EVERY 4 WEEKS      carBAMazepine (CARBATROL) 300 MG 12 hr capsule 300 mg every 12 (twelve) hours   1    cyclobenzaprine (FLEXERIL) 10 mg tablet Take 1 tablet (10 mg total) by mouth daily at bedtime for 21 days 21 tablet 0    ibuprofen (MOTRIN) 200 mg tablet Take 200 mg by mouth every 6 (six) hours as needed for mild pain       methocarbamol (ROBAXIN) 500 mg tablet Take 1 tablet (500 mg total) by mouth 2 (two) times a day 20 tablet 0    rosuvastatin (CRESTOR) 20 MG tablet Take 1 tablet (20 mg total) by mouth daily 90 tablet 0    SUMAtriptan (IMITREX) 100 mg tablet Take 1 tablet (100 mg total) by mouth once as needed for migraine for up to 60 doses 30 tablet 0    valACYclovir (VALTREX) 500 mg tablet 500 mg daily As needed  2    benzonatate (TESSALON) 200 MG capsule Take 1 capsule (200 mg total) by mouth 3 (three) times a day as needed for cough (Patient not taking: Reported on 5/3/2024) 30 capsule 0    LORazepam (ATIVAN) 2 mg tablet TAKE 1 TABLET BY MOUTH 20 MINUTES PRIOR TO SCHEDULED MRI FOR SEDATION (Patient not taking: Reported on 5/31/2024)      methylPREDNISolone 4 MG tablet therapy pack Use as directed on package 21 each 0    predniSONE 10 mg tablet  (Patient not taking: Reported on 5/3/2024)      predniSONE 20 mg  tablet 4 tabs for three days, 3 tabs for three days, 2 tabs for three days, 1 tab for three days, 1/2 tab for 4 days (Patient not taking: Reported on 2024) 32 tablet 0     No current facility-administered medications on file prior to visit.     Social History     Socioeconomic History    Marital status: /Civil Union     Spouse name: Not on file    Number of children: Not on file    Years of education: Not on file    Highest education level: Not on file   Occupational History    Not on file   Tobacco Use    Smoking status: Former     Current packs/day: 0.00     Average packs/day: 1 pack/day for 20.0 years (20.0 ttl pk-yrs)     Types: Cigarettes     Start date: 6/15/1992     Quit date: 2007     Years since quittin.0     Passive exposure: Never    Smokeless tobacco: Never   Vaping Use    Vaping status: Never Used   Substance and Sexual Activity    Alcohol use: Yes     Comment: rare    Drug use: Never    Sexual activity: Not on file   Other Topics Concern    Not on file   Social History Narrative    Daily coffee consumption    Daily tea consumption     as per all scripts    Lack of exercise    Sleeps 6-7 hours a day      Social Determinants of Health     Financial Resource Strain: Not on file   Food Insecurity: Not on file   Transportation Needs: Not on file   Physical Activity: Not on file   Stress: Not on file   Social Connections: Not on file   Intimate Partner Violence: Not on file   Housing Stability: Not on file         I have reviewed the past medical, surgical, social and family history, medications and allergies as documented in the EMR.    Review of systems: ROS is negative other than that noted in the HPI.  Constitutional: Negative for fatigue and fever.   HENT: Negative for sore throat.    Respiratory: Negative for shortness of breath.    Cardiovascular: Negative for chest pain.   Gastrointestinal: Negative for abdominal pain.   Endocrine: Negative for cold intolerance and heat  "intolerance.   Genitourinary: Negative for flank pain.   Musculoskeletal: Negative for back pain.   Skin: Negative for rash.   Allergic/Immunologic: Negative for immunocompromised state.   Neurological: Negative for dizziness.   Psychiatric/Behavioral: Negative for agitation.      Physical Exam:    Blood pressure 132/88, pulse 87, height 5' 1\" (1.549 m), weight 64.4 kg (142 lb), not currently breastfeeding.    General/Constitutional: NAD, well developed, well nourished  HENT: Normocephalic, atraumatic  CV: Intact distal pulses, regular rate  Resp: No respiratory distress or labored breathing  Abdomen: soft, nondistended, non tender   Lymphatic: No lymphadenopathy palpated  Neuro: Alert and Oriented x 3, no focal deficits  Psych: Normal mood, normal affect  Skin: Warm, dry, no rashes, no erythema      Shoulder Exam:      Inspection: No ecchymosis, edema, or deformity. No visualized wounds or skin lesions   Palpation:mild AC joint tenderness. Mild bicipital groove tenderness.  There is significant tenderness along the periscapular muscles on the left as well as the cervical spine musculature  Active Motion:       FF: 160, symmetric        ER: 80, symmetric        IR: T12, symmetric  Strength: 5/5 empty can with pain, 5/5 ER,  5/5 IR, 5/5 elbow flexion/extension  Sensory - SILT in the Radial / Ulnar / Median / Axillary nerve distributions  Motor - AIN / PIN / Radial / Ulnar / Median / Axillary motor nerves in tact  Palpable Radial pulse  Cap refill <2secs in all digits    - Lift Off Test  +Spurling  - Yann's test        Imaging    I reviewed and interpreted X-rays of the left shoulder which show no acute osseous abnormalities or significant degenerative changes. Humerus is well-centered on the glenoid.     I reviewed and interpreted CT of the cervical spine, which shows FINDINGS:     ALIGNMENT:  There is straightening of normal cervical lordosis.  No subluxation or compression deformity.     VERTEBRAE:  No " fracture.     DEGENERATIVE CHANGES:     At C5-C6, there is moderate to space narrowing. Central disc osteophyte complex with uncinate hypertrophy resulting in minimal canal stenosis with mild bilateral foraminal stenosis.     At C6-C7, there is moderate to space narrowing. Broad-based disc osteophyte complex and uncinate hypertrophy resulting in mild canal and mild bilateral foraminal stenosis      Large joint arthrocentesis: L subacromial bursa  Universal Protocol:  Consent: Verbal consent obtained.  Risks and benefits: risks, benefits and alternatives were discussed  Consent given by: patient  Timeout called at: 5/31/2024 8:51 AM.  Patient understanding: patient states understanding of the procedure being performed  Patient consent: the patient's understanding of the procedure matches consent given  Site marked: the operative site was marked  Radiology Images displayed and confirmed. If images not available, report reviewed: imaging studies available  Patient identity confirmed: verbally with patient  Supporting Documentation  Indications: pain   Procedure Details  Location: shoulder - L subacromial bursa  Preparation: Patient was prepped and draped in the usual sterile fashion  Needle size: 22 G  Approach: posterior  Medications administered: 40 mg triamcinolone acetonide 40 mg/mL; 4 mL bupivacaine 0.5 %    Patient tolerance: patient tolerated the procedure well with no immediate complications  Dressing:  Sterile dressing applied

## 2024-05-31 NOTE — PROGRESS NOTES
"Daily Note     Today's date: 2024  Patient name: Karly Howell  : 1975  MRN: [id number]  Referring provider: Lombardi, Frank, DO  Dx:   Encounter Diagnosis     ICD-10-CM    1. Cervical radiculopathy  M54.12       2. Cervical pain (neck)  M54.2           Start Time: 1100  Stop Time: 1123  Total time in clinic (min): 23 minutes    Subjective: Patient stated that she went to orthopedic surgeon earlier today to check on RTC. Patient states that the orthopedic surgeon still believes the issue is within in the cervical spine and not shoulder and gave her a CSI in left shoulder.   Objective: See treatment diary below      Assessment: Tolerated treatment well. Today's session focused primarily on cervical and thoracic spine mobility. Temporary pause placed on shoulder exercises secondary to CSI earlier that day. Provided manual cervical distraction, which she tolerated well. Patient was advised to continue shoulder specific HEP 2 days post CSI. Patient demonstrated fatigue post treatment, exhibited good technique with therapeutic exercises, and would benefit from continued PT      Plan: Continue per plan of care.      Insurance:  AMA/CMS Eval/ Re-eval POC expires FOTO Auth #/ Referral # Total units  Start date  Expiration date Extension  Visit limitation?  PT only or  PT+OT? Co-Insurance   [place]: CMS Eval 24   Auth is REQ      PT only none                                                                  Date              Units:  Used 1 2             Authed:  Remaining                      Date 24     Visit Number IE 2 3 4     Manual         Thoracic PA   Prone gr 4-5        CTJ   Backpack gr 5 Manual distraction 3 x 30 sec Cerv manual distraction 3x30s     STM  Pec minor release        Nerve gliding   Median 5\" x20        Shoulder mobs   Assessment and gr 1-2 mobs 3 x 30 sec    Distraction 3 x 30 sec w/ oscillations               TherEx         SNAGs          Cat cow  " " 15x        Open books   20x   10\"x10 standing     Thoracic ext   10\"x10   10\"x10     Thread the needle   Attempted       Doorway stretch   10\"x10        Wall slides + lift off  10x       Rows          Shoulder ext          Shoulder isos   All directions 10 x 5 sec (used as treat and diagnostic)  -- added to HEP      Shoulder hang   Stationary shoulder hang 3 x 30 sec               Neuro Re-Ed         Chin tucks  Supine 2x10  Attempted p!  2x10 seated     Scap retract x10    2x10     Scap depress          Median N. tensioners X10         SL shoulder abd          SL shoulder horizontal abd          Is Ts Ys                  TherAct         Patient education Posture, POC, and HEP  Assess shoulder and discuss POC x 20 min                                          Gait Training                                    Modalities                      Precautions: h/o seizures (controlled by medication)  Past Medical History:   Diagnosis Date    Cervical radiculopathy     RESOLVED: [id number] buldging disc    Hyperlipidemia     Kidney stone     Migraines     Seizure (HCC)     age 24                "

## 2024-06-04 ENCOUNTER — OFFICE VISIT (OUTPATIENT)
Dept: PHYSICAL THERAPY | Facility: CLINIC | Age: 49
End: 2024-06-04
Payer: COMMERCIAL

## 2024-06-04 DIAGNOSIS — M54.12 CERVICAL RADICULOPATHY: Primary | ICD-10-CM

## 2024-06-04 DIAGNOSIS — M54.2 CERVICAL PAIN (NECK): ICD-10-CM

## 2024-06-04 DIAGNOSIS — G43.109 MIGRAINE WITH AURA AND WITHOUT STATUS MIGRAINOSUS, NOT INTRACTABLE: ICD-10-CM

## 2024-06-04 PROCEDURE — 97110 THERAPEUTIC EXERCISES: CPT

## 2024-06-04 RX ORDER — SUMATRIPTAN 100 MG/1
100 TABLET, FILM COATED ORAL ONCE AS NEEDED
Qty: 30 TABLET | Refills: 1 | Status: SHIPPED | OUTPATIENT
Start: 2024-06-04

## 2024-06-04 NOTE — PROGRESS NOTES
"Daily Note     Today's date: 2024  Patient name: Karly Howell  : 1975  MRN: [id number]  Referring provider: Lombardi, Frank, DO  Dx:   Encounter Diagnosis     ICD-10-CM    1. Cervical radiculopathy  M54.12       2. Cervical pain (neck)  M54.2                      Subjective: Patient reports that the shoulder CSI has done nothing to help her pain.      Objective: See treatment diary below      Assessment: Tolerated treatment well. Discussed that CSI can be both therapeutic and diagnostic in that it can help us understanding if the source of her pain is neck versus shoulder. She expressed understanding, but is frustrated with lack of relief. She is scheduled to have an MRI on 24 and we will reassess based on findings. Performed mechanical traction today to assess benefit since patient was unable to tolerate manual neck interventions today. Patient demonstrated fatigue post treatment, exhibited good technique with therapeutic exercises, and would benefit from continued PT      Plan: Continue per plan of care.        Insurance:  AMA/CMS Eval/ Re-eval POC expires FOTO Auth #/ Referral # Total units  Start date  Expiration date Extension  Visit limitation?  PT only or  PT+OT? Co-Insurance   [place]: CMS Eval 24   Auth is REQ      PT only none                                                                  Date              Units:  Used 1 2             Authed:  Remaining                      Date 24    Visit Number IE 2 3 4 5    Manual         Thoracic PA   Prone gr 4-5    Prone gr 4-5    CTJ   Backpack gr 5 Manual distraction 3 x 30 sec Cerv manual distraction 3x30s Cerv manual distraction 3x30s    STM  Pec minor release        Nerve gliding   Median 5\" x20        Shoulder mobs   Assessment and gr 1-2 mobs 3 x 30 sec    Distraction 3 x 30 sec w/ oscillations               TherEx         SNAGs          Cat cow   15x        Open books   20x   10\"x10 " "standing     Thoracic ext   10\"x10   10\"x10     Thread the needle   Attempted       Doorway stretch   10\"x10        Wall slides + lift off  10x       Rows          Shoulder ext          Shoulder isos   All directions 10 x 5 sec (used as treat and diagnostic)  -- added to HEP      Shoulder hang   Stationary shoulder hang 3 x 30 sec      Traction      Traction: 15 min  - max = 15#  - min = 5#             Neuro Re-Ed         Chin tucks  Supine 2x10  Attempted p!  2x10 seated     Scap retract x10    2x10     Scap depress          Median N. tensioners X10         SL shoulder abd          SL shoulder horizontal abd          Is Ts Ys                  TherAct         Patient education Posture, POC, and HEP  Assess shoulder and discuss POC x 20 min                                          Gait Training                                    Modalities                      Precautions: h/o seizures (controlled by medication)  Past Medical History:   Diagnosis Date    Cervical radiculopathy     RESOLVED: [id number] buldging disc    Hyperlipidemia     Kidney stone     Migraines     Seizure (HCC)     age 24                "

## 2024-06-05 ENCOUNTER — CONSULT (OUTPATIENT)
Dept: PAIN MEDICINE | Facility: CLINIC | Age: 49
End: 2024-06-05
Payer: COMMERCIAL

## 2024-06-05 VITALS
HEART RATE: 85 BPM | WEIGHT: 142 LBS | DIASTOLIC BLOOD PRESSURE: 75 MMHG | HEIGHT: 61 IN | BODY MASS INDEX: 26.81 KG/M2 | SYSTOLIC BLOOD PRESSURE: 135 MMHG

## 2024-06-05 DIAGNOSIS — M47.22 CERVICAL SPONDYLOSIS WITH RADICULOPATHY: ICD-10-CM

## 2024-06-05 PROCEDURE — 99204 OFFICE O/P NEW MOD 45 MIN: CPT | Performed by: STUDENT IN AN ORGANIZED HEALTH CARE EDUCATION/TRAINING PROGRAM

## 2024-06-05 RX ORDER — MELOXICAM 15 MG/1
15 TABLET ORAL DAILY
Qty: 30 TABLET | Refills: 0 | Status: SHIPPED | OUTPATIENT
Start: 2024-06-05 | End: 2024-07-05

## 2024-06-05 RX ORDER — TIZANIDINE 4 MG/1
4 TABLET ORAL 2 TIMES DAILY PRN
Qty: 60 TABLET | Refills: 0 | Status: SHIPPED | OUTPATIENT
Start: 2024-06-05 | End: 2024-07-05

## 2024-06-05 NOTE — PROGRESS NOTES
Assessment:  1. Cervical spondylosis with radiculopathy    Patient is a pleasant 48-year-old woman referred by Dr. Demetrio Tompkins for 4 weeks of neck pain with left-sided radicular symptoms.  Over the past month the intensity pains been moderate to severe and she rates the pain currently as a 5 in the morning and 10 at night.  The pain occurs nearly constantly and is worse throughout the day.  She describes pain as cramping, shooting, sharp, dull aching with some weakness in the upper extremities.  She does not use any assistive devices.  Activities that increase her pain include lying down, sitting, coughing, sneezing, bowel movement.  Patient does have a CT scan of her cervical spine from 5/19/2024 which did demonstrate Moderate to space narrowing at C5-C6 with central canal and foraminal stenosis with similar findings at C6/C7.  Patient does have an MRI of the cervical spine ordered as well for 6/20.  Patient did receive prescription of Robaxin, prednisone and Flexeril to try to help with her symptoms.  Prior pain treatments including heat and ice which provides moderate relief.  Past medical history significant for anxiety, high cholesterol, seizure disorder.  She does not smoke or drink.  Other medications include Tylenol, ibuprofen, Robaxin.  She states Robaxin did not really help. Images independently reviewed and discussed with patient.     Discussed with patient that the dermatomal distribution of her symptoms corresponds with her disc herniations at C5-C6 and C6/C7.  Patient has underwent 3 weeks of physical therapy so far and reports some improvement in her symptoms.  For symptomatic relief will rotate patient's anti-inflammatory to meloxicam 15 mg daily.  Will also change patient muscle relaxant to tizanidine 4 mg twice daily as needed.  Patient scheduled to get MRI of the cervical spine on 6/20/2024.  Pending those results can consider cervical epidural steroid injection.    Plan:  Continue PT  Start  tizanidine 4mg BID prn  Start mobic 15 mg daily prn  Continue apap 975 mg TID prn   MRI C Spine ordered. Pending results, can consider ELFEGO  No orders of the defined types were placed in this encounter.      New Medications Ordered This Visit   Medications   • tiZANidine (ZANAFLEX) 4 mg tablet     Sig: Take 1 tablet (4 mg total) by mouth 2 (two) times a day as needed for muscle spasms     Dispense:  60 tablet     Refill:  0   • meloxicam (MOBIC) 15 mg tablet     Sig: Take 1 tablet (15 mg total) by mouth daily     Dispense:  30 tablet     Refill:  0         My impressions and treatment recommendations were discussed in detail with the patient, who verbalized understanding and had no further questions.    Complete risks and benefits including bleeding, infection, tissue reaction, nerve injury and allergic reaction were discussed. The approach was demonstrated using models and literature was provided. Verbal and written consent was obtained.    Follow-up is planned in four weeks time or sooner as warranted.  Discharge instructions were provided. I personally saw and examined the patient and I agree with the above discussed plan of care.    History of Present Illness:    Karly Howell is a 48 y.o. female who presents to [facility] for initial evaluation of the above stated pain complaints. The patient has a past medical and chronic pain history as outlined in the assessment section. She was referred by Rachel Rock PA-C  [address] [address], [address]  [place] [address].    Patient is a pleasant 48-year-old woman referred by Dr. Demetrio Tompkins for 4 weeks of neck pain with left-sided radicular symptoms.  Over the past month the intensity pains been moderate to severe and she rates the pain currently as a 5 in the morning and 10 at night.  The pain occurs nearly constantly and is worse throughout the day.  She describes pain as cramping, shooting, sharp, dull aching with  some weakness in the upper extremities.  She does not use any assistive devices.  Activities that increase her pain include lying down, sitting, coughing, sneezing, bowel movement.  Patient does have a CT scan of her cervical spine from 5/19/2024 which did demonstrate Moderate to space narrowing at C5-C6 with central canal and foraminal stenosis with similar findings at C6/C7.  Patient does have an MRI of the cervical spine ordered as well for 6/20.  Patient did receive prescription of Robaxin, prednisone and Flexeril to try to help with her symptoms.  Prior pain treatments including heat and ice which provides moderate relief.  Past medical history significant for anxiety, high cholesterol, seizure disorder.  She does not smoke or drink.  Other medications include Tylenol, ibuprofen, Robaxin.  She states Robaxin did not really help. Images independently reviewed and discussed with patient.     Review of Systems:    Review of Systems   Constitutional:  Negative for unexpected weight change.   HENT:  Negative for ear pain.    Eyes:  Negative for visual disturbance.   Respiratory:  Negative for shortness of breath and wheezing.    Gastrointestinal:  Negative for abdominal pain.   Musculoskeletal:  Positive for back pain, joint swelling, neck pain and neck stiffness.        Decreased ROM, joint and muscle pain   Neurological:  Positive for weakness and headaches. Negative for numbness.   Psychiatric/Behavioral:  Positive for sleep disturbance. Negative for decreased concentration.            Past Medical History:   Diagnosis Date   • Cervical radiculopathy     RESOLVED: [id number] buldging disc   • Hyperlipidemia    • Kidney stone    • Migraines    • Seizure (HCC)     age 24       Past Surgical History:   Procedure Laterality Date   • ADENOIDECTOMY     • COLONOSCOPY     • LITHOTRIPSY  2006    RENAL   • OVARIAN CYST REMOVAL  2012   • KS HYSTEROSCOPY BX ENDOMETRIUM&/POLYPC W/WO D&C N/A 10/28/2019    Procedure: OPERATIVE  HYSTEROSCOPY (MYOSURE),POLYPECTOMY,D AND C;  Surgeon: Roel Rothman MD;  Location: [facility];  Service: Gynecology       Family History   Problem Relation Age of Onset   • Multiple sclerosis Mother    • Colon cancer Maternal Grandmother 70   • Rheum arthritis Maternal Grandmother    • Arthritis Family    • Colon cancer Family    • Osteoporosis Family    • Anxiety disorder Father    • Chromosomal disorder Cousin    • No Known Problems Sister    • No Known Problems Daughter    • Skin cancer Maternal Grandfather 65        not sure of exact age.   • No Known Problems Paternal Grandmother    • No Known Problems Paternal Grandfather    • No Known Problems Daughter    • No Known Problems Son    • No Known Problems Maternal Aunt    • No Known Problems Maternal Aunt    • No Known Problems Paternal Aunt        Social History     Occupational History   • Not on file   Tobacco Use   • Smoking status: Former     Current packs/day: 0.00     Average packs/day: 1 pack/day for 20.0 years (20.0 ttl pk-yrs)     Types: Cigarettes     Start date: 6/15/1992     Quit date: 2007     Years since quittin.0     Passive exposure: Never   • Smokeless tobacco: Never   Vaping Use   • Vaping status: Never Used   Substance and Sexual Activity   • Alcohol use: Yes     Comment: rare   • Drug use: Never   • Sexual activity: Not on file         Current Outpatient Medications:   •  Aimovig 140 MG/ML SOAJ, ADMINISTER 1 ML UNDER THE SKIN EVERY 4 WEEKS, Disp: , Rfl:   •  carBAMazepine (CARBATROL) 300 MG 12 hr capsule, 300 mg every 12 (twelve) hours , Disp: , Rfl: 1  •  ibuprofen (MOTRIN) 200 mg tablet, Take 200 mg by mouth every 6 (six) hours as needed for mild pain , Disp: , Rfl:   •  meloxicam (MOBIC) 15 mg tablet, Take 1 tablet (15 mg total) by mouth daily, Disp: 30 tablet, Rfl: 0  •  methocarbamol (ROBAXIN) 500 mg tablet, Take 1 tablet (500 mg total) by mouth 2 (two) times a day, Disp: 20 tablet, Rfl: 0  •  rosuvastatin (CRESTOR) 20 MG  "tablet, Take 1 tablet (20 mg total) by mouth daily, Disp: 90 tablet, Rfl: 0  •  SUMAtriptan (IMITREX) 100 mg tablet, Take 1 tablet (100 mg total) by mouth once as needed for migraine for up to 60 doses, Disp: 30 tablet, Rfl: 1  •  tiZANidine (ZANAFLEX) 4 mg tablet, Take 1 tablet (4 mg total) by mouth 2 (two) times a day as needed for muscle spasms, Disp: 60 tablet, Rfl: 0  •  valACYclovir (VALTREX) 500 mg tablet, 500 mg daily As needed, Disp: , Rfl: 2  •  benzonatate (TESSALON) 200 MG capsule, Take 1 capsule (200 mg total) by mouth 3 (three) times a day as needed for cough (Patient not taking: Reported on 5/3/2024), Disp: 30 capsule, Rfl: 0  •  cyclobenzaprine (FLEXERIL) 10 mg tablet, Take 1 tablet (10 mg total) by mouth daily at bedtime for 21 days, Disp: 21 tablet, Rfl: 0  •  LORazepam (ATIVAN) 2 mg tablet, Take 1 tablet (2 mg total) by mouth 1 (one) time for 1 dose TAKE 1 TABLET BY MOUTH 20 MINUTES PRIOR TO SCHEDULED MRI, Disp: 1 tablet, Rfl: 0  •  methylPREDNISolone 4 MG tablet therapy pack, Use as directed on package, Disp: 21 each, Rfl: 0  •  predniSONE 10 mg tablet, , Disp: , Rfl:   •  predniSONE 20 mg tablet, 4 tabs for three days, 3 tabs for three days, 2 tabs for three days, 1 tab for three days, 1/2 tab for 4 days (Patient not taking: Reported on 5/31/2024), Disp: 32 tablet, Rfl: 0    Allergies   Allergen Reactions   • Ciprofloxacin Lightheadedness     dizzy   • Phenytoin Rash     Reaction Date: 05Oct2010;        Physical Exam:    /75   Pulse 85   Ht 5' 1\" (1.549 m)   Wt 64.4 kg (142 lb)   LMP  (LMP Unknown)   BMI 26.83 kg/m²     Constitutional: normal, well developed, well nourished, alert, in no distress and non-toxic and no overt pain behavior.  Eyes: anicteric  HEENT: grossly intact  Neck: supple, symmetric, trachea midline and no masses   Pulmonary:even and unlabored  Cardiovascular:No edema or pitting edema present  Skin:Normal without rashes or lesions and well hydrated  Psychiatric:Mood " and affect appropriate  Neurologic:Cranial Nerves II-XII grossly intact  Musculoskeletal:normal gait. Pain with flexion of the cervical spine. Negative spurling's bilaterally. Positive Badoky sign on the left. Strength 5/5 in bilateral upper extremities. Sensation intact to light touch in bilateral upper extremities. TTP in in left cervical paraspinal muscles with taut bands and positive Jump sign.      CT cervical spine without contrast  Status: Final result     PACS Images     Show images for CT cervical spine without contrast  Study Result    Narrative & Impression   CT CERVICAL SPINE - WITHOUT CONTRAST     INDICATION:   pain in neck radiating down left shoulder.     COMPARISON:  None.     TECHNIQUE:  CT examination of the cervical spine was performed without intravenous contrast.  Contiguous axial images were obtained. Multiplanar 2D reformatted images were created from the source data.     Radiation dose length product (DLP) for this visit:  508 mGy-cm .  This examination, like all CT scans performed in the [facility], was performed utilizing techniques to minimize radiation dose exposure, including the use of iterative   reconstruction and automated exposure control.     IMAGE QUALITY:  Diagnostic.     FINDINGS:     ALIGNMENT:  There is straightening of normal cervical lordosis.  No subluxation or compression deformity.     VERTEBRAE:  No fracture.     DEGENERATIVE CHANGES:     At C5-C6, there is moderate to space narrowing. Central disc osteophyte complex with uncinate hypertrophy resulting in minimal canal stenosis with mild bilateral foraminal stenosis.     At C6-C7, there is moderate to space narrowing. Broad-based disc osteophyte complex and uncinate hypertrophy resulting in mild canal and mild bilateral foraminal stenosis     PREVERTEBRAL AND PARASPINAL SOFT TISSUES: Unremarkable     THORACIC INLET:  Normal.     IMPRESSION:     No cervical spine fracture or traumatic malalignment.      Degenerative changes, as described. This can be better detailed by MRI if clinically warranted.           Workstation performed: [id number]        Imaging    CT cervical spine without contrast (Order: [id number]) - 5/19/2024    Result History    CT cervical spine without contrast (Order #[id number]) on 5/19/2024 - Order Result History Report    Order Report     Order Details  Order Questions    Question Answer   Pregnant? No   What is the patient's sedation requirement? No Sedation   Note: If medication for claustrophobia is needed, please order medication at this point.   Release to patient through Mychart Immediate   Exam reason pain in neck radiating down left shoulder   Note: Enter reason for exam            Result Information    Status Priority Source   Final result (5/19/2024  8:51 PM) STAT      Other Results from 5/19/2024     CBC and differential Final result 5/19/2024    Comprehensive metabolic panel Final result 5/19/2024    C-reactive protein Final result 5/19/2024    Procalcitonin Final result 5/19/2024     Reason for Exam    pain in neck radiating down left shoulder           CT cervical spine without contrast: Patient Communication     Add Comments   Seen       Signed by    Signed Date/Time  Phone Pager   CECI MANSFIELD 5/19/2024 20:51 [phone]        Exam Information    Status Exam Begun  Exam Ended  Performing Tech   Final [99] 5/19/2024 19:14 5/19/2024 19:17 Fran Mckeon       Questionnaire          Question Answer Comment   1. Is the patient pregnant? No    2. What is the patient's sedation requirement? No Sedation    3. Release to patient through [email or web address] Immediate    4. Reason for Exam pain in neck radiating down left shoulder          End Exam      IMAGING END EXAM CT    Question Answer Comment   1. Is the patient pregnant? No    2. Smoking status: Former smoker    3. GFR/date drawn (if not already in Epic):     4. Is the patient on Metformin? No    5. Enteric? Not given    6.  Script Info/History/Comments: pain in neck radiating down left shoulder    7. Other comments for Radiologist:     8. Relevant outside priors (not already in PACS)? No    9. If yes, are they being acquired?     10. Was there an IV Contrast Extravasation? No    11. What was the Type and Amount of Contrast Inflitrated?     12. What was the location of the IV Site?     13. What treatment was provided to the patient?     14. If a surgeon was consulted, who was notified?     15. Name of Physician/Nurse who evaluated the patient:     16. Did the patient have a contrast reaction? No    17. Was the patient pre-medicated?     18. What type of reaction did the patient experience?     19. Was jewelry removed from the patient? No    20. Jewelry removed:           PATIENT EDUCATION    Question Answer Comment   1. Was the patient educated? Yes    2. Why was the patient not educated?            Screening Form Questions    No questions have been answered for this form.       External Results Report    Open External Results Report      Encounter    View Encounter                     Patient Care Timeline    No data selected in time range    Pre-op Summary    Pre-op             Recovery Summary    Recovery                 Routing History    None     Imaging      No orders to display       No orders of the defined types were placed in this encounter.

## 2024-06-06 ENCOUNTER — OFFICE VISIT (OUTPATIENT)
Dept: PHYSICAL THERAPY | Facility: CLINIC | Age: 49
End: 2024-06-06
Payer: COMMERCIAL

## 2024-06-06 DIAGNOSIS — M54.12 CERVICAL RADICULOPATHY: Primary | ICD-10-CM

## 2024-06-06 DIAGNOSIS — M54.2 CERVICAL PAIN (NECK): ICD-10-CM

## 2024-06-06 PROCEDURE — 97110 THERAPEUTIC EXERCISES: CPT

## 2024-06-06 NOTE — PROGRESS NOTES
"Daily Note     Today's date: 2024  Patient name: Karly Howell  : 1975  MRN: [id number]  Referring provider: Lombardi, Frank, DO  Dx:   Encounter Diagnosis     ICD-10-CM    1. Cervical radiculopathy  M54.12       2. Cervical pain (neck)  M54.2                      Subjective: Patient reports that she thinks she got some relief from the traction last session. She also saw Dr. [facility] who agreed that her pain may be multifactorial and coming from both the neck and the shoulder.      Objective: See treatment diary below      Assessment: Tolerated treatment well. Continued with mechanical traction since patient reports that she felt benefit following last session. Patient reports that following traction she had significant relief in her Lt shoulder symptoms. Will continue traction for symptoms modulation as needed. Patient demonstrated fatigue post treatment, exhibited good technique with therapeutic exercises, and would benefit from continued PT to address functional mobility and return to PLOF.      Plan: Continue per plan of care.        Insurance:  AMA/CMS Eval/ Re-eval POC expires FOTO Auth #/ Referral # Total units  Start date  Expiration date Extension  Visit limitation?  PT only or  PT+OT? Co-Insurance   [place]: CMS Eval 24   Auth is REQ      PT only none                                                                  Date              Units:  Used 1 2             Authed:  Remaining                      Date 24   Visit Number IE 2 3 4 5 6   Manual         Thoracic PA   Prone gr 4-5    Prone gr 4-5    CTJ   Backpack gr 5 Manual distraction 3 x 30 sec Cerv manual distraction 3x30s Cerv manual distraction 3x30s    STM  Pec minor release        Nerve gliding   Median 5\" x20        Shoulder mobs   Assessment and gr 1-2 mobs 3 x 30 sec    Distraction 3 x 30 sec w/ oscillations               TherEx         SNAGs          Cat cow  " " 15x        Open books   20x   10\"x10 standing     Thoracic ext   10\"x10   10\"x10     Thread the needle   Attempted       Doorway stretch   10\"x10        Wall slides + lift off  10x       Rows          Shoulder ext          Shoulder isos   All directions 10 x 5 sec (used as treat and diagnostic)  -- added to HEP      Shoulder hang   Stationary shoulder hang 3 x 30 sec      Traction      Traction: 15 min  - max = 15#  - min = 5# Traction: 30 min w/ setup  - max = 15#  - min = 5#            Neuro Re-Ed         Chin tucks  Supine 2x10  Attempted p!  2x10 seated     Scap retract x10    2x10     Scap depress          Median N. tensioners X10         SL shoulder abd          SL shoulder horizontal abd          Is Ts Ys                  TherAct         Patient education Posture, POC, and HEP  Assess shoulder and discuss POC x 20 min                                          Gait Training                                    Modalities                      Precautions: h/o seizures (controlled by medication)  Past Medical History:   Diagnosis Date    Cervical radiculopathy     RESOLVED: [id number] buldging disc    Hyperlipidemia     Kidney stone     Migraines     Seizure (HCC)     age 24                "

## 2024-06-11 ENCOUNTER — OFFICE VISIT (OUTPATIENT)
Dept: PHYSICAL THERAPY | Facility: CLINIC | Age: 49
End: 2024-06-11
Payer: COMMERCIAL

## 2024-06-11 DIAGNOSIS — M54.2 CERVICAL PAIN (NECK): ICD-10-CM

## 2024-06-11 DIAGNOSIS — M54.12 CERVICAL RADICULOPATHY: Primary | ICD-10-CM

## 2024-06-11 PROCEDURE — 97110 THERAPEUTIC EXERCISES: CPT

## 2024-06-11 NOTE — PROGRESS NOTES
"Daily Note     Today's date: 2024  Patient name: Karly Howell  : 1975  MRN: [id number]  Referring provider: Lombardi, Frank, DO  Dx:   Encounter Diagnosis     ICD-10-CM    1. Cervical radiculopathy  M54.12       2. Cervical pain (neck)  M54.2                      Subjective: Patient reports that she thinks she got some relief from the traction last session. She also saw Dr. Tompkins with [facility] who agreed that her pain may be multifactorial and coming from both the neck and the shoulder.      Objective: See treatment diary below      Assessment: Tolerated treatment well. Continued with mechanical traction since patient reports that she felt benefit following last session. Will continue traction for symptoms modulation as needed. Patient will be getting cervical MRI on 24. Patient demonstrated fatigue post treatment, exhibited good technique with therapeutic exercises, and would benefit from continued PT to address functional mobility and return to PLOF.      Plan: Continue per plan of care.        Insurance:  AMA/CMS Eval/ Re-eval POC expires FOTO Auth #/ Referral # Total units  Start date  Expiration date Extension  Visit limitation?  PT only or  PT+OT? Co-Insurance   [place]: CMS Eval 24   Auth is REQ      PT only none                                                                  Date              Units:  Used 1 2             Authed:  Remaining                      Date 24   Visit Number 3 4 5 6 7   Manual        Thoracic PA    Prone gr 4-5     CTJ  Manual distraction 3 x 30 sec Cerv manual distraction 3x30s Cerv manual distraction 3x30s     STM        Nerve gliding         Shoulder mobs Assessment and gr 1-2 mobs 3 x 30 sec    Distraction 3 x 30 sec w/ oscillations               TherEx        SNAGs         Cat cow         Open books   10\"x10 standing      Thoracic ext   10\"x10      Thread the needle         Doorway stretch         Wall " slides + lift off        Rows         Shoulder ext         Shoulder isos All directions 10 x 5 sec (used as treat and diagnostic)  -- added to HEP       Shoulder hang Stationary shoulder hang 3 x 30 sec       Traction    Traction: 15 min  - max = 15#  - min = 5# Traction: 30 min w/ setup  - max = 15#  - min = 5# Traction: 30 min w/ setup  - max = 20#  - min = 10#           Neuro Re-Ed        Chin tucks   2x10 seated      Scap retract  2x10      Scap depress         Median N. tensioners        SL shoulder abd         SL shoulder horizontal abd         Is Ts Ys                TherAct        Patient education Assess shoulder and discuss POC x 20 min                                       Gait Training                                Modalities                    Precautions: h/o seizures (controlled by medication)  Past Medical History:   Diagnosis Date    Cervical radiculopathy     RESOLVED: [id number] buldging disc    Hyperlipidemia     Kidney stone     Migraines     Seizure (HCC)     age 24

## 2024-06-13 ENCOUNTER — OFFICE VISIT (OUTPATIENT)
Dept: PHYSICAL THERAPY | Facility: CLINIC | Age: 49
End: 2024-06-13
Payer: COMMERCIAL

## 2024-06-13 DIAGNOSIS — M54.12 CERVICAL RADICULOPATHY: Primary | ICD-10-CM

## 2024-06-13 DIAGNOSIS — M54.2 CERVICAL PAIN (NECK): ICD-10-CM

## 2024-06-13 PROCEDURE — 97110 THERAPEUTIC EXERCISES: CPT

## 2024-06-13 NOTE — PROGRESS NOTES
"Daily Note     Today's date: 2024  Patient name: Kalry Howell  : 1975  MRN: [id number]  Referring provider: Lombardi, Frank, DO  Dx:   Encounter Diagnosis     ICD-10-CM    1. Cervical radiculopathy  M54.12       2. Cervical pain (neck)  M54.2                      Subjective: Patient reports that she did not like the angle of cervical traction performed last session to target the lower C-spine.      Objective: See treatment diary below      Assessment: Tolerated treatment well. Continued with mechanical traction since patient reports that she felt benefit following last session. Will continue traction for symptoms modulation as needed. Patient will be getting cervical MRI on 24. Patient demonstrated fatigue post treatment, exhibited good technique with therapeutic exercises, and would benefit from continued PT to address functional mobility and return to PLOF.      Plan: Continue per plan of care.        Insurance:  AMA/CMS Eval/ Re-eval POC expires FOTO Auth #/ Referral # Total units  Start date  Expiration date Extension  Visit limitation?  PT only or  PT+OT? Co-Insurance   [place]: CMS Eval 24   Auth is REQ      PT only none                                                                  Date              Units:  Used 1 2             Authed:  Remaining                      Date 24   Visit Number 4 5 6 7 8   Manual        Thoracic PA   Prone gr 4-5      CTJ  Cerv manual distraction 3x30s Cerv manual distraction 3x30s      STM        Nerve gliding         Shoulder mobs                TherEx        SNAGs         Cat cow         Open books  10\"x10 standing       Thoracic ext  10\"x10       Thread the needle         Doorway stretch         Wall slides + lift off        Rows         Shoulder ext         Shoulder isos        Shoulder hang        Traction   Traction: 15 min  - max = 15#  - min = 5# Traction: 30 min w/ setup  - max = 15#  - min = 5# " Traction: 30 min w/ setup  - max = 20#  - min = 10# Traction: 30 min w/ setup (10 deg angle of pull)  - max = 20#  - min = 10#           Neuro Re-Ed        Chin tucks  2x10 seated       Scap retract 2x10       Scap depress         Median N. tensioners        SL shoulder abd         SL shoulder horizontal abd         Is Ts Ys                TherAct        Patient education                                        Gait Training                                Modalities                    Precautions: h/o seizures (controlled by medication)  Past Medical History:   Diagnosis Date    Cervical radiculopathy     RESOLVED: [id number] buldging disc    Hyperlipidemia     Kidney stone     Migraines     Seizure (HCC)     age 24

## 2024-06-17 ENCOUNTER — OFFICE VISIT (OUTPATIENT)
Dept: INTERNAL MEDICINE CLINIC | Facility: CLINIC | Age: 49
End: 2024-06-17
Payer: COMMERCIAL

## 2024-06-17 VITALS
HEART RATE: 80 BPM | HEIGHT: 61 IN | SYSTOLIC BLOOD PRESSURE: 118 MMHG | DIASTOLIC BLOOD PRESSURE: 70 MMHG | OXYGEN SATURATION: 100 % | WEIGHT: 143.6 LBS | BODY MASS INDEX: 27.11 KG/M2

## 2024-06-17 DIAGNOSIS — R11.0 NAUSEA: ICD-10-CM

## 2024-06-17 DIAGNOSIS — R31.9 HEMATURIA, UNSPECIFIED TYPE: ICD-10-CM

## 2024-06-17 DIAGNOSIS — L23.9 ALLERGIC DERMATITIS: Primary | ICD-10-CM

## 2024-06-17 PROCEDURE — 99214 OFFICE O/P EST MOD 30 MIN: CPT | Performed by: STUDENT IN AN ORGANIZED HEALTH CARE EDUCATION/TRAINING PROGRAM

## 2024-06-17 RX ORDER — OMEPRAZOLE 20 MG/1
20 CAPSULE, DELAYED RELEASE ORAL DAILY
Qty: 30 CAPSULE | Refills: 1 | Status: SHIPPED | OUTPATIENT
Start: 2024-06-17 | End: 2024-08-16

## 2024-06-17 RX ORDER — TRIAMCINOLONE ACETONIDE 0.25 MG/G
CREAM TOPICAL 2 TIMES DAILY
Qty: 15 G | Refills: 1 | Status: SHIPPED | OUTPATIENT
Start: 2024-06-17

## 2024-06-17 NOTE — PROGRESS NOTES
"Ambulatory Visit  Name: Karly Howell      : 1975      MRN: [id number]  Encounter Provider: Teddy Weiner MD  Encounter Date: 2024   Encounter department: [facility]    Assessment & Plan   1. Allergic dermatitis  Assessment & Plan:  Topical triamcinolone cream as directed  OTC Benadryl as needed for itching  Follow-up with dermatology.   Orders:  -     triamcinolone (KENALOG) 0.025 % cream; Apply topically 2 (two) times a day  2. Hematuria, unspecified type  Assessment & Plan:  Single episode  Denies any pain  Labs today  Frequent hydration  Return sooner or to ER if symptoms worsen or develop any new symptoms.  Patient agreed.    Orders:  -     Urinalysis with microscopic; Future  -     Urine culture; Future  -     Urinalysis with microscopic  -     Urine culture  3. Nausea  Assessment & Plan:  Intermittent  Likely secondary to mild gastritis as symptoms improved with OTC Tums.  Advised to use Advil sparingly.  Trial of PPI  Follow-up with gastroenterology.  Patient reports appointment with GI next week.  Return sooner or to ER if symptoms worsen or develop any new symptoms.  Patient agreed.  Orders:  -     omeprazole (PriLOSEC) 20 mg delayed release capsule; Take 1 capsule (20 mg total) by mouth daily       History of Present Illness     Patient is here with complaints of rash right elbow for 4 days assoc with itching.  Denies any pain or swelling.  Denies any injury or trauma.  Also complains of nausea for 2 days, no vomiting,  able to tolerate po intake. \"Have been taking a lot of advils, 4 advils every 6 hours for cervical pain\".  Reports symptoms improve with Tums.   Also reports mild Discomfort when urinating, single episode of pink stain on tissue paper 2 days ago, mild increase in frequency and urgency.  Patient reluctant to start empiric antibiotics, and wants to wait for urine lab results before treatment initiation.  Denies any other complaints at this " "time.    Rash  Pertinent negatives include no cough, diarrhea, fever, shortness of breath, sore throat or vomiting.       Review of Systems   Constitutional:  Negative for chills and fever.   HENT:  Negative for ear pain and sore throat.    Eyes:  Negative for pain and visual disturbance.   Respiratory:  Negative for cough, shortness of breath and wheezing.    Cardiovascular:  Negative for chest pain and palpitations.   Gastrointestinal:  Positive for nausea. Negative for abdominal pain, constipation, diarrhea and vomiting.   Genitourinary:  Positive for frequency, hematuria and urgency. Negative for dysuria.   Musculoskeletal:  Negative for arthralgias and back pain.   Skin:  Positive for rash.   Neurological:  Negative for dizziness, seizures, syncope, weakness and headaches.   All other systems reviewed and are negative.      Objective     /70   Pulse 80   Ht 5' 1\" (1.549 m)   Wt 65.1 kg (143 lb 9.6 oz)   SpO2 100%   BMI 27.13 kg/m²     Physical Exam  Vitals and nursing note reviewed.   Constitutional:       General: She is not in acute distress.     Appearance: She is well-developed.   HENT:      Head: Normocephalic and atraumatic.      Mouth/Throat:      Mouth: Mucous membranes are moist.      Pharynx: Oropharynx is clear. No oropharyngeal exudate.   Eyes:      Conjunctiva/sclera: Conjunctivae normal.      Pupils: Pupils are equal, round, and reactive to light.   Cardiovascular:      Rate and Rhythm: Normal rate and regular rhythm.   Pulmonary:      Effort: Pulmonary effort is normal. No respiratory distress.      Breath sounds: Normal breath sounds. No wheezing.   Abdominal:      Palpations: Abdomen is soft.      Tenderness: There is no abdominal tenderness.   Musculoskeletal:      Cervical back: Neck supple.      Right lower leg: No edema.      Left lower leg: No edema.   Skin:     General: Skin is warm and dry.      Capillary Refill: Capillary refill takes less than 2 seconds.      Findings: Rash " present.      Comments: Mild erythematous dry patchy rash right elbow.  No open wounds, nondraining.   Neurological:      General: No focal deficit present.      Mental Status: She is alert and oriented to person, place, and time.      Gait: Gait normal.   Psychiatric:         Mood and Affect: Mood normal.

## 2024-06-17 NOTE — ASSESSMENT & PLAN NOTE
Topical triamcinolone cream as directed  OTC Benadryl as needed for itching  Follow-up with dermatology.

## 2024-06-17 NOTE — ASSESSMENT & PLAN NOTE
Single episode  Denies any pain  Labs today  Frequent hydration  Return sooner or to ER if symptoms worsen or develop any new symptoms.  Patient agreed.

## 2024-06-17 NOTE — ASSESSMENT & PLAN NOTE
Intermittent  Likely secondary to mild gastritis as symptoms improved with OTC Tums.  Advised to use Advil sparingly.  Trial of PPI  Follow-up with gastroenterology.  Patient reports appointment with GI next week.  Return sooner or to ER if symptoms worsen or develop any new symptoms.  Patient agreed.

## 2024-06-18 ENCOUNTER — OFFICE VISIT (OUTPATIENT)
Dept: PHYSICAL THERAPY | Facility: CLINIC | Age: 49
End: 2024-06-18
Payer: COMMERCIAL

## 2024-06-18 ENCOUNTER — TELEPHONE (OUTPATIENT)
Dept: OBGYN CLINIC | Facility: CLINIC | Age: 49
End: 2024-06-18

## 2024-06-18 DIAGNOSIS — M54.2 CERVICAL PAIN (NECK): ICD-10-CM

## 2024-06-18 DIAGNOSIS — M54.12 CERVICAL RADICULOPATHY: Primary | ICD-10-CM

## 2024-06-18 PROCEDURE — 97110 THERAPEUTIC EXERCISES: CPT

## 2024-06-18 NOTE — TELEPHONE ENCOUNTER
----- Message from Stephen Tompkins MD sent at 6/18/2024  9:11 AM EDT -----  Regarding: RE: MRI CERVICAL DENIAL  Please let her know that her insurance is requiring PT for 6 weeks prior to MRI. I recommend she complete the 6 weeks, then follow up with Dr. MARÍA Tompkins to determine if the MRI is still indicated at that time.  ----- Message -----  From: Leslee Vazquez  Sent: 6/18/2024   8:53 AM EDT  To: Stephen Tompkins MD; #  Subject: MRI CERVICAL DENIAL                              GM,    UNFORTUNATELY, CERVICAL MRI SCHEDULED FOR 6/20 HAS BEEN DENIED.    P2P IS POSSIBLE IF YOU WOULD LIKE, BY CALLING [phone] AND USE CASE #[id number].  PLEASE LET ME KNOW YOUR DECISION AND OUTCOME AS SOON AS POSSIBLE.    THANKS,  XAVIER    Your healthcare provider told us that you have neck pain. The request cannot be approved because: A study similar to the one requested has recently been performed. The results of that study showed your doctor what they needed to see in order to treat your problem. No additional imaging is needed at this time. Imaging requires six weeks of provider directed treatment to be completed. Supported treatments include (but are not limited to) drugs for swelling or pain, an in office workout (physical therapy), and/or oral or injected steroids. This must have been completed in the past three months without improved symptoms. Contact (via office visit, phone, email, or messaging) must occur after the treatment is completed. This has not been met because: You have not completed six weeks of provider directed treatment. This finding was based on review of [employer] [facility] Guidelines Section(s): Neck (Cervical Spine) Pain without and with Neurological Features (Including Stenosis) (SP 3.1) and 1.0 General Guidelines,  The applicable criteria or guidelines used in making this decision are available upon request at no charge by calling [employer] at [phone]. If the requesting physician  would like to discuss this determination with the [facility] Medical Director who reviewed this request, the requesting physician may call [facility] at [phone].  What if I disagree with this decision?  You have the right to appeal this decision. Information about your appeal rights is included with this letter.  You may request the information used to make this decision, free of charge, by calling [phone].  Your practitioner can also discuss this decision with the medical director who reviewed this request by calling [phone].  Please contact your practitioner for guidance on your current course of treatment.

## 2024-06-18 NOTE — PROGRESS NOTES
Daily Note     Today's date: 2024  Patient name: Karly Howell  : 1975  MRN: [id number]  Referring provider: Lombardi, Frank, DO  Dx:   Encounter Diagnosis     ICD-10-CM    1. Cervical radiculopathy  M54.12       2. Cervical pain (neck)  M54.2                      Subjective: Patient reports her MRI was denied. She states the insurance request 6 weeks of PT before performing an MRI. She states that she is still getting pain in the lateral proximal delt and and left shoulder and neck. She does report that she no longer feels pain distal to left elbow. She reports the pain is most noticed when driving, but not when sitting in passenger seat. She states she believes that the cause would be the 's side has less space for movement as compared to the passenger side. She also confirms relief lasting 2 days after mechanical cervical traction.       Objective: See treatment diary below      Assessment: Tolerated treatment well. Included shoulder and periscapular strengthening and mobility exercises. She tolerated well, with no increase in reported pain. She also tolerated mechanical cervical traction well. Plan to reassess progress from initial evaluation and continue to progress strengthening as tolerated. Patient demonstrated fatigue post treatment, exhibited good technique with therapeutic exercises, and would benefit from continued PT to decrease limitation secondary to pain.      Plan: Progress note during next visit.      Insurance:  AMA/CMS Eval/ Re-eval POC expires FOTO Auth #/ Referral # Total units  Start date  Expiration date Extension  Visit limitation?  PT only or  PT+OT? Co-Insurance   [place]: CMS Eval 24   Auth is REQ    12 PT only none                                                                   Date 24   Visit Number 5 6 7 8 9   Manual        Thoracic PA  Prone gr 4-5       CTJ  Cerv manual distraction 3x30s       STM        Nerve gliding          Shoulder mobs                TherEx        SNAGs         Cat cow         Open books         Thoracic ext         Thread the needle         Doorway stretch         Wall slides + lift off     GTB x10    Rows      Bent over row x20   Shoulder ext         Shoulder isos        Shoulder hang        Traction  Traction: 15 min  - max = 15#  - min = 5# Traction: 30 min w/ setup  - max = 15#  - min = 5# Traction: 30 min w/ setup  - max = 20#  - min = 10# Traction: 30 min w/ setup (10 deg angle of pull)  - max = 20#  - min = 10# Traction: 18 min w/ setup (10 deg angle of pull)  - max = 20#  - min = 10#           Neuro Re-Ed        Chin tucks         Scap retract        Scap depress         Median N. tensioners        SL shoulder abd         SL shoulder horizontal abd         Is Ts Ys     Bent over Ts x20           TherAct        Patient education                                        Gait Training                                Modalities                    Precautions: h/o seizures (controlled by medication)  Past Medical History:   Diagnosis Date    Cervical radiculopathy     RESOLVED: [id number] buldging disc    Hyperlipidemia     Kidney stone     Migraines     Seizure (HCC)     age 24

## 2024-06-19 LAB
APPEARANCE UR: CLEAR
BACTERIA UR CULT: NORMAL
BACTERIA URNS QL MICRO: NORMAL
BILIRUB UR QL STRIP: NEGATIVE
CASTS URNS QL MICRO: NORMAL /LPF
COLOR UR: YELLOW
EPI CELLS #/AREA URNS HPF: NORMAL /HPF (ref 0–10)
GLUCOSE UR QL: NEGATIVE
HGB UR QL STRIP: NEGATIVE
KETONES UR QL STRIP: NEGATIVE
LEUKOCYTE ESTERASE UR QL STRIP: NEGATIVE
Lab: NO GROWTH
MICRO URNS: NORMAL
MICRO URNS: NORMAL
NITRITE UR QL STRIP: NEGATIVE
PH UR STRIP: 6 [PH] (ref 5–7.5)
PROT UR QL STRIP: NEGATIVE
RBC #/AREA URNS HPF: NORMAL /HPF (ref 0–2)
SP GR UR: 1.02 (ref 1–1.03)
UROBILINOGEN UR STRIP-ACNC: 0.2 MG/DL (ref 0.2–1)
WBC #/AREA URNS HPF: NORMAL /HPF (ref 0–5)

## 2024-06-21 ENCOUNTER — EVALUATION (OUTPATIENT)
Dept: PHYSICAL THERAPY | Facility: CLINIC | Age: 49
End: 2024-06-21
Payer: COMMERCIAL

## 2024-06-21 DIAGNOSIS — M54.12 CERVICAL RADICULOPATHY: Primary | ICD-10-CM

## 2024-06-21 DIAGNOSIS — M54.2 CERVICAL PAIN (NECK): ICD-10-CM

## 2024-06-21 LAB
ALBUMIN SERPL-MCNC: 4 G/DL (ref 3.9–4.9)
ALP SERPL-CCNC: 64 IU/L (ref 44–121)
ALT SERPL-CCNC: 13 IU/L (ref 0–32)
AST SERPL-CCNC: 17 IU/L (ref 0–40)
BILIRUB SERPL-MCNC: 0.2 MG/DL (ref 0–1.2)
BUN SERPL-MCNC: 14 MG/DL (ref 6–24)
BUN/CREAT SERPL: 22 (ref 9–23)
CALCIUM SERPL-MCNC: 8.8 MG/DL (ref 8.7–10.2)
CHLORIDE SERPL-SCNC: 104 MMOL/L (ref 96–106)
CHOLEST SERPL-MCNC: 170 MG/DL (ref 100–199)
CO2 SERPL-SCNC: 24 MMOL/L (ref 20–29)
CREAT SERPL-MCNC: 0.65 MG/DL (ref 0.57–1)
EGFR: 109 ML/MIN/1.73
GLOBULIN SER-MCNC: 1.8 G/DL (ref 1.5–4.5)
GLUCOSE SERPL-MCNC: 86 MG/DL (ref 70–99)
HDLC SERPL-MCNC: 102 MG/DL
LDLC SERPL CALC-MCNC: 57 MG/DL (ref 0–99)
LDLC/HDLC SERPL: 0.6 RATIO (ref 0–3.2)
MICRODELETION SYND BLD/T FISH: NORMAL
POTASSIUM SERPL-SCNC: 4.7 MMOL/L (ref 3.5–5.2)
PROT SERPL-MCNC: 5.8 G/DL (ref 6–8.5)
SL AMB VLDL CHOLESTEROL CALC: 11 MG/DL (ref 5–40)
SODIUM SERPL-SCNC: 139 MMOL/L (ref 134–144)
TRIGL SERPL-MCNC: 51 MG/DL (ref 0–149)

## 2024-06-21 PROCEDURE — 97164 PT RE-EVAL EST PLAN CARE: CPT

## 2024-06-21 NOTE — PROGRESS NOTES
PT Re-Evaluation   Today's date: 2024  Patient name: Karly Howell  : 1975  MRN: [id number]  Referring provider: Lombardi, Frank, DO  Dx:   Encounter Diagnosis     ICD-10-CM    1. Cervical radiculopathy  M54.12       2. Cervical pain (neck)  M54.2                 Assessment  Karly Howell has been attending OPPT for 10 sessions over the course of 4.5 weeks. Since then, she has made progress in both range of motion in the cervical spine and physical strength in the left upper extremity. The gains she has made allow her to sleep better at night and improve her overall function. Despite her progress over the last 4 weeks, Karly still continues to demonstrate impairments secondary to pain in the neck and left shoulder. These impairments include pain limiting function, decreased physical strength in the left UE compared to the right UE, and decreased mobility in the cervical spine. These impairments limit Karly's ability to drive her car without pain radiating down her left arm. Karly Howell would continue to benefit from skilled PT to address the impairments above to reduce the limitations secondary to pain when performing tasks like driving. Thank you again for the referral.       Primary movement impairments:  1) Neck pain with radicular symptoms   2) Limited mobility     Impairments: Activity intolerance, Impaired physical strength, Lacks appropriate HEP, Poor posture, Poor body mechanics, Pain with function, Scapular dyskinesis, Abnormal movement, and Abnormal muscle firing  Understanding of Dx/Px/POC: Good  Prognosis: Good   Positive prognostic factors: age, motivation, active adult      Patient verbalized understanding of POC.         Please contact me if you have any questions or recommendations. Thank you for the referral and the opportunity to share in Karly Howell's care.        Plan  Plan details: 2x per week for 6 weeks     Patient  "would benefit from: Continued Skilled PT  Planned modality interventions: Cryotherapy and Traction  Planned therapy interventions: Body mechanics training, Dry Needling, Functional ROM exercises, HEP, Joint mobilization, Manual therapy, Motor coordination training, Neuromuscular re-education, Patient education, Postural training, Strengthening, Stretching, Therapeutic activities, Therapeutic exercises, and Activity modification  Frequency: 2x/wk  Duration in weeks: 8  Plan of Care beginning date: 6/21/2024   Plan of Care expiration date: 8/16/2024  Treatment plan discussed with: Patient       Goals  Short Term Goals (4 weeks):    - Patient will be independent in basic HEP 2-3 weeks (6/21/24: MET)  - Patient will report >50% reduction in pain (6/21/24: PROGRESSING)  - Patient will demonstrate >1/3 improvement in MMT grade as applicable (6/21/24: MET)  - Patient will work a full 8 hour day with a reported pain of <4/10 (6/21/24: PROGRESSING 7/10)  - Patient will be able to carry 10lb weight without reported increase in pain (6/21/24: NOT ATTEMPTED)    Long Term Goals (6 weeks):  - Patient will be independent in a comprehensive home exercise program   - Patient FOTO score will improve by 9 points   - Patient will self-report >75% improvement in function   - Patient will be independent with all ADLs without a reported increase in pain  - Patient will be able to carry 10lb weight without reported increase in pain       Subjective    History of Present Illness    Patient states that she believes she has made some progress as she no longer feels pain in her left forearm/elbow. She does continue to report pain in left shoulder and neck. She states \"her pain depends on the day... some days are bad, some days are better.\" She finds driving and prolonged sitting to be the most aggravating activities. She reports improved quality of sleep. She also reports her MRI was cx due not completing 6 weeks of physical therapy. She is " currently working on scheduling the MRI as we are closely approaching 6 weeks. (6 weeks will be completed on 7/02/2024).     Pain 6/10    Objective       UE MMT  RIGHT    LEFT  - Shoulder Shrug (C4): 4/5 (6/21/24: 4+/5) 4/5 (6/21/24: 4+/5)  - Shoulder Abduction (C5):  4+/5 (6/21/24: 4+/5) 4-/5 (6/21/24: 4+/5)  - Elbow Flexion (C6):  4+/5 (6/21/24: 5/5)  4/5 (6/21/24: 5/5)  - Elbow Extension (C7): 4+/5 (6/21/24: 5/5)  4-/5 (6/21/24: 4+/5)  - Thumb Extension (C8): 4+/5 (6/21/24: 5/5)  4+/5 (6/21/24: 5/5)  - Finger Adduction (T1):  4+/5 (6/21/24: 5/5)  4+/5 (6/21/24: 5/5)        Cervical Spine Range of Motion  Flexion:  Moderately limited  without pain   (6/21/24: Minimally limited pain)   Extension:  Moderately limited  with pain   (6/21/24: Minimally limited no pain)   Lateral Flexion - Left:  Minimally limited  without pain (6/21/24: Minimally limited pain)   Lateral Flexion - Right:  Minimally limited  without pain (6/21/24: Minimally limited pain)  Rotation - Left:  Minimally limited  without pain   (6/21/24: 55 deg with pain)  Rotation - Right:  Minimally limited  without pain  (6/21/24: 70 deg no pain)    Shoulder Range of Motion  Flexion: WNL b/l  Abduction: WNL b/l   External Rotation: C7 b/l (6/21/24: C7 b/l)  Internal Rotation: L2 b/l  (6/21/24: T10 b/l)    Mechanical Assessment  In Standing:  - Retraction: Centralizing     Joint Play  OA: Normal  AA: Normal  C2: Normal  C3: Normal  C4: Normal  C5: Normal  C6: Hypomobile  C7: Hypomobile  CTJ: Hypomobile  Mid-Thoracic Spine: Hypomobile    Diagnostic Tests Performed  Positive: Cervical Rotation Lateral Flexion (1st rib assessment), Spurling A, and ULTT 1A (median nerve)  Negative: Alar Ligament and Sharp Jeremiah                 Insurance:  AMA/CMS Eval/ Re-eval POC expires FOTO Auth #/ Referral # Total units  Start date  Expiration date Extension  Visit limitation?  PT only or  PT+OT? Co-Insurance   [place]: CMS Eval 5/21/24   Auth is REQ  5/21 8/21  12 PT only  none                                                                   Date 6/4/24 6/6/24 6/11/24 6/13/24 6/18/24 6/21/24   Visit Number 5 6 7 8 9 10 re-ev   Manual         Thoracic PA  Prone gr 4-5        CTJ  Cerv manual distraction 3x30s        STM         Nerve gliding          Shoulder mobs               K tape L shoulder Y tape    TherEx         SNAGs          Cat cow          Open books          Thoracic ext          Thread the needle          Doorway stretch          Wall slides + lift off     GTB x10     Rows      Bent over row x20    Shoulder ext          Shoulder isos         Shoulder hang         Traction  Traction: 15 min  - max = 15#  - min = 5# Traction: 30 min w/ setup  - max = 15#  - min = 5# Traction: 30 min w/ setup  - max = 20#  - min = 10# Traction: 30 min w/ setup (10 deg angle of pull)  - max = 20#  - min = 10# Traction: 18 min w/ setup (10 deg angle of pull)  - max = 20#  - min = 10#             Neuro Re-Ed         Chin tucks          Scap retract         Scap depress          Median N. tensioners         SL shoulder abd          SL shoulder horizontal abd          Is Ts Ys     Bent over Ts x20             TherAct      Assessment   Patient education                                             Gait Training                                    Modalities                      Precautions: h/o seizures (controlled by medication)  Past Medical History:   Diagnosis Date    Cervical radiculopathy     RESOLVED: [id number] buldging disc    Hyperlipidemia     Kidney stone     Migraines     Seizure (HCC)     age 24         Precautions: h/o seizures (controlled by medication)  Past Medical History:   Diagnosis Date    Cervical radiculopathy     RESOLVED: [id number] buldging disc    Hyperlipidemia     Kidney stone     Migraines     Seizure (HCC)     age 24

## 2024-06-25 ENCOUNTER — OFFICE VISIT (OUTPATIENT)
Dept: PHYSICAL THERAPY | Facility: CLINIC | Age: 49
End: 2024-06-25
Payer: COMMERCIAL

## 2024-06-25 DIAGNOSIS — M54.12 CERVICAL RADICULOPATHY: Primary | ICD-10-CM

## 2024-06-25 DIAGNOSIS — M54.2 CERVICAL PAIN (NECK): ICD-10-CM

## 2024-06-25 PROCEDURE — 97110 THERAPEUTIC EXERCISES: CPT

## 2024-06-25 NOTE — PROGRESS NOTES
Daily Note     Today's date: 2024  Patient name: Karly Howell  : 1975  MRN: [id number]  Referring provider: Lombardi, Frank, DO  Dx:   Encounter Diagnosis     ICD-10-CM    1. Cervical radiculopathy  M54.12       2. Cervical pain (neck)  M54.2                      Subjective: Patient reports some improvements since starting PT, but continues to have symptoms that are worse with driving.      Objective: See treatment diary below      Assessment: Tolerated treatment fair. Continued with established POC to get patient relief leading up to MRI scheduled for 7/10/24. Patient felt a reproduction of symptoms down her arm with palpation and STM to UT and posterior RTC muscles. Ended session with mechanical traction as she does feel that it gives her some relief. Patient demonstrated fatigue post treatment and would benefit from continued PT to address functional mobility deficits and return to PLOF.       Plan: Continue per plan of care.        Insurance:  AMA/CMS Eval/ Re-eval POC expires FOTO Auth #/ Referral # Total units  Start date  Expiration date Extension  Visit limitation?  PT only or  PT+OT? Co-Insurance   [place]: CMS Eval 24   Auth is REQ    12 PT only none                                                                   Date 24   Visit Number 7 8 9 10 re-ev 11   Manual        Thoracic PA         CTJ      Manual traction x 5 min    SOR x 5 min   STM     UT and posterior scap x 10 min   Nerve gliding         Shoulder mobs            K tape L shoulder Y tape     TherEx        SNAGs         Cat cow         Open books         Thoracic ext         Thread the needle         Doorway stretch         Wall slides + lift off   GTB x10      Rows    Bent over row x20     Shoulder ext         Shoulder isos        Shoulder hang        Traction  Traction: 30 min w/ setup  - max = 20#  - min = 10# Traction: 30 min w/ setup (10 deg angle of pull)  - max = 20#  - min =  10# Traction: 18 min w/ setup (10 deg angle of pull)  - max = 20#  - min = 10#  Traction: 20 min w/ setup (10 deg angle of pull)  - max = 20#  - min = 10#           Neuro Re-Ed        Chin tucks         Scap retract        Scap depress         Median N. tensioners        SL shoulder abd         SL shoulder horizontal abd         Is Ts Ys   Bent over Ts x20             TherAct    Assessment    Patient education                                        Gait Training                                Modalities                    Precautions: h/o seizures (controlled by medication)  Past Medical History:   Diagnosis Date    Cervical radiculopathy     RESOLVED: [id number] buldging disc    Hyperlipidemia     Kidney stone     Migraines     Seizure (HCC)     age 24         Precautions: h/o seizures (controlled by medication)  Past Medical History:   Diagnosis Date    Cervical radiculopathy     RESOLVED: [id number] buldging disc    Hyperlipidemia     Kidney stone     Migraines     Seizure (HCC)     age 24

## 2024-06-26 ENCOUNTER — TELEPHONE (OUTPATIENT)
Dept: INTERNAL MEDICINE CLINIC | Facility: CLINIC | Age: 49
End: 2024-06-26

## 2024-06-26 NOTE — TELEPHONE ENCOUNTER
----- Message from Teddy Weiner MD sent at 6/26/2024  9:19 AM EDT -----  Reviewed.  Urinalysis unremarkable.  Urine culture negative.  Return for evaluation if develop any symptoms.

## 2024-06-27 ENCOUNTER — OFFICE VISIT (OUTPATIENT)
Dept: PHYSICAL THERAPY | Facility: CLINIC | Age: 49
End: 2024-06-27
Payer: COMMERCIAL

## 2024-06-27 ENCOUNTER — CONSULT (OUTPATIENT)
Dept: GASTROENTEROLOGY | Facility: CLINIC | Age: 49
End: 2024-06-27
Payer: COMMERCIAL

## 2024-06-27 VITALS
HEIGHT: 61 IN | WEIGHT: 142 LBS | OXYGEN SATURATION: 98 % | DIASTOLIC BLOOD PRESSURE: 83 MMHG | SYSTOLIC BLOOD PRESSURE: 133 MMHG | HEART RATE: 83 BPM | BODY MASS INDEX: 26.81 KG/M2

## 2024-06-27 DIAGNOSIS — R14.0 BLOATING: Primary | ICD-10-CM

## 2024-06-27 DIAGNOSIS — M54.12 CERVICAL RADICULOPATHY: Primary | ICD-10-CM

## 2024-06-27 DIAGNOSIS — R11.0 NAUSEA: ICD-10-CM

## 2024-06-27 DIAGNOSIS — M54.2 CERVICAL PAIN (NECK): ICD-10-CM

## 2024-06-27 DIAGNOSIS — R19.4 CHANGE IN BOWEL HABIT: ICD-10-CM

## 2024-06-27 PROCEDURE — 99214 OFFICE O/P EST MOD 30 MIN: CPT | Performed by: PHYSICIAN ASSISTANT

## 2024-06-27 PROCEDURE — 97110 THERAPEUTIC EXERCISES: CPT | Performed by: PHYSICAL THERAPIST

## 2024-06-27 NOTE — LETTER
June 27, 2024     Frank Lombardi, DO  [address]  [address]  [facility] [address]    Patient: Karly Howell   YOB: 1975   Date of Visit: 6/27/2024       Dear Dr. Lombardi:    Thank you for referring Karly Howell to me for evaluation. Below are my notes for this consultation.    If you have questions, please do not hesitate to call me. I look forward to following your patient along with you.         Sincerely,        Michael Fam PA-C        CC: No Recipients    Michael Fam PA-C  6/27/2024  3:33 PM  Cosign Needed  [facility] - Outpatient Consultation  Karly Howell 48 y.o. female MRN: [id number]  Encounter: [id number]          ASSESSMENT AND PLAN:      1. Bloating  -EGD September 2019 normal with gastric biopsy negative for H. pylori and gastric intestinal metaplasia.  Duodenal biopsy negative for celiac disease    2. Nausea  - Plan EGD due to bloating and nausea  - I have reviewed the risks of endoscopy which include but are not limited to; anesthesia complications, injury/perforation of the bowel, infection and bleeding.  Patient given the opportunity to review the full consent form.    3. Change in bowels  - due to change in bowels and family history of colon cancer and autoimmune disease will plan colonoscopy  - I have reviewed the risks of endoscopy which include but are not limited to; anesthesia complications, injury/perforation of the bowel, infection and bleeding.  Patient given the opportunity to review the full consent form.    4. Screen colon  -Last colonoscopy September 2019 with adequate bowel prep showed only sigmoid diverticulosis and random colon biopsy was negative for microscopic colitis  -Next colonoscopy would be due at age 50      ______________________________________________________________________    Chief Complaint: Bloating    HPI: This is a 48-year-old female with past medical history of migraine, anxiety,  generalized seizure, hyperlipidemia, hemorrhoids who presents with concerns for bloating.  She had an EGD and colonoscopy in September 2019 which were unrevealing.  Duodenal biopsy negative for celiac disease.    Endoscopy History:  EGD -September 2019 normal.  Gastric biopsy negative for H. pylori and gastric intestinal metaplasia.  Duodenal biopsy negative for celiac disease.  Colonoscopy -September 2019 with adequate bowel prep showed only sigmoid diverticulosis and random colon biopsy negative for microscopic colitis.    REVIEW OF SYSTEMS:    CONSTITUTIONAL: Denies any fever, chills, rigors, and weight loss.  HEENT: Denies hearing loss or visual disturbances.  CARDIOVASCULAR: No chest pain or palpitations.   RESPIRATORY: Denies any cough, hemoptysis, shortness of breath or dyspnea on exertion.  GASTROINTESTINAL: As noted in the History of Present Illness.   GENITOURINARY: No problems with urination. Denies any hematuria or dysuria.  NEUROLOGIC: No dizziness or vertigo, denies headaches.   MUSCULOSKELETAL: Denies any muscle or joint pain.   SKIN: Denies skin rashes or itching.   ENDOCRINE: Denies excessive thirst. Denies intolerance to heat or cold.  PSYCHOSOCIAL: Denies depression or anxiety. Denies any recent memory loss.       Historical Information  Past Medical History:   Diagnosis Date   • Cervical radiculopathy     RESOLVED: [id number] buldging disc   • Hyperlipidemia    • Kidney stone    • Migraines    • Seizure (HCC)     age 24     Past Surgical History:   Procedure Laterality Date   • ADENOIDECTOMY     • COLONOSCOPY     • EGD AND COLONOSCOPY  09/18/2019   • LITHOTRIPSY  2006    RENAL   • OVARIAN CYST REMOVAL  2012   • SC HYSTEROSCOPY BX ENDOMETRIUM&/POLYPC W/WO D&C N/A 10/28/2019    Procedure: OPERATIVE HYSTEROSCOPY (MYOSURE),POLYPECTOMY,D AND C;  Surgeon: Roel Rothman MD;  Location: [facility];  Service: Gynecology     Social History  Social History     Substance and Sexual Activity   Alcohol Use Not  Currently    Comment: rare     Social History     Substance and Sexual Activity   Drug Use Not Currently   • Types: Marijuana    Comment: gummy     Social History     Tobacco Use   Smoking Status Former   • Current packs/day: 0.00   • Average packs/day: 1 pack/day for 20.0 years (20.0 ttl pk-yrs)   • Types: Cigarettes   • Start date: 6/15/1992   • Quit date: 2007   • Years since quittin.1   • Passive exposure: Never   Smokeless Tobacco Never     Family History   Problem Relation Age of Onset   • Multiple sclerosis Mother    • Colon cancer Maternal Grandmother 70   • Rheum arthritis Maternal Grandmother    • Arthritis Family    • Colon cancer Family    • Osteoporosis Family    • Anxiety disorder Father    • Chromosomal disorder Cousin    • No Known Problems Sister    • No Known Problems Daughter    • Skin cancer Maternal Grandfather 65        not sure of exact age.   • No Known Problems Paternal Grandmother    • No Known Problems Paternal Grandfather    • No Known Problems Daughter    • No Known Problems Son    • No Known Problems Maternal Aunt    • No Known Problems Maternal Aunt    • No Known Problems Paternal Aunt        Meds/Allergies      Current Outpatient Medications:   •  Aimovig 140 MG/ML SOAJ  •  carBAMazepine (CARBATROL) 300 MG 12 hr capsule  •  Casanthranol-Docusate Sodium (STOOL SOFTENER PLUS PO)  •  cyclobenzaprine (FLEXERIL) 10 mg tablet  •  meloxicam (MOBIC) 15 mg tablet  •  polyethylene glycol (GOLYTELY) 4000 mL solution  •  rosuvastatin (CRESTOR) 20 MG tablet  •  SUMAtriptan (IMITREX) 100 mg tablet  •  triamcinolone (KENALOG) 0.025 % cream  •  valACYclovir (VALTREX) 500 mg tablet  •  benzonatate (TESSALON) 200 MG capsule  •  ibuprofen (MOTRIN) 200 mg tablet  •  LORazepam (ATIVAN) 2 mg tablet  •  methocarbamol (ROBAXIN) 500 mg tablet  •  omeprazole (PriLOSEC) 20 mg delayed release capsule  •  predniSONE 10 mg tablet  •  predniSONE 20 mg tablet  •  tiZANidine (ZANAFLEX) 4 mg  "tablet    Allergies   Allergen Reactions   • Ciprofloxacin Lightheadedness     dizzy   • Phenytoin Rash     Reaction Date: 05Oct2010;            Objective    Blood pressure 133/83, pulse 83, height 5' 1\" (1.549 m), weight 64.4 kg (142 lb), SpO2 98%, not currently breastfeeding. Body mass index is 26.83 kg/m².        PHYSICAL EXAM:      General Appearance:   Alert, cooperative, no distress, appears stated age   HEENT:   Normocephalic, atraumatic, anicteric.     Neck:  Supple, symmetrical   Lungs:   Clear to auscultation bilaterally; no rales, rhonchi or wheezing; respirations unlabored    Heart::   Regular rate and rhythm; no murmur, rub, or gallop.   Abdomen:   Soft, epigastric and B/L lower TTP, non-distended; normal bowel sounds; no masses, no organomegaly    Genitalia:   Deferred    Rectal:   Deferred    Extremities:  No cyanosis, clubbing or edema    Pulses:  Not assessed   Skin:  No jaundice, rashes, or lesions    Lymph nodes:  Not assessed       Lab Results:   No visits with results within 1 Day(s) from this visit.   Latest known visit with results is:   Orders Only on 06/20/2024   Component Date Value   • Glucose, Random 06/20/2024 86    • BUN 06/20/2024 14    • Creatinine 06/20/2024 0.65    • eGFR 06/20/2024 109    • SL AMB BUN/CREATININE RA* 06/20/2024 22    • Sodium 06/20/2024 139    • Potassium 06/20/2024 4.7    • Chloride 06/20/2024 104    • CO2 06/20/2024 24    • CALCIUM 06/20/2024 8.8    • Protein, Total 06/20/2024 5.8 (L)    • Albumin 06/20/2024 4.0    • Globulin, Total 06/20/2024 1.8    • TOTAL BILIRUBIN 06/20/2024 0.2    • Alk Phos Isoenzymes 06/20/2024 64    • AST 06/20/2024 17    • ALT 06/20/2024 13    • Cholesterol, Total 06/20/2024 170    • Triglycerides 06/20/2024 51    • HDL 06/20/2024 102    • VLDL Cholesterol Calcula* 06/20/2024 11    • LDL Calculated 06/20/2024 57    • LDl/HDL Ratio 06/20/2024 0.6    • Interpretation 06/20/2024 Note          Radiology Results:   No results found.    Michael " SAÚL Fam PA-C
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
## 2024-06-27 NOTE — LETTER
June 27, 2024     Frank Lombardi, DO  [address]  [address]  [facility] [address]    Patient: Karly Howell   YOB: 1975   Date of Visit: 6/27/2024       Dear Dr. Lombardi:    Thank you for referring Karly Howell to me for evaluation. Below are my notes for this consultation.    If you have questions, please do not hesitate to call me. I look forward to following your patient along with you.         Sincerely,        Michael Fam PA-C        CC: No Recipients    Michael Fam PA-C  6/27/2024  3:30 PM  [facility] - Outpatient Consultation  Karly Howell 48 y.o. female MRN: [id number]  Encounter: [id number]          ASSESSMENT AND PLAN:      1. Bloating  -EGD September 2019 normal with gastric biopsy negative for H. pylori and gastric intestinal metaplasia.  Duodenal biopsy negative for celiac disease    2. Nausea  - Plan EGD due to bloating and nausea  - I have reviewed the risks of endoscopy which include but are not limited to; anesthesia complications, injury/perforation of the bowel, infection and bleeding.  Patient given the opportunity to review the full consent form.    3. Change in bowels  - due to change in bowels and family history of colon cancer and autoimmune disease will plan colonoscopy  - I have reviewed the risks of endoscopy which include but are not limited to; anesthesia complications, injury/perforation of the bowel, infection and bleeding.  Patient given the opportunity to review the full consent form.    4. Screen colon  -Last colonoscopy September 2019 with adequate bowel prep showed only sigmoid diverticulosis and random colon biopsy was negative for microscopic colitis  -Next colonoscopy would be due at age 50      ______________________________________________________________________    Chief Complaint: Bloating    HPI: This is a 48-year-old female with past medical history of migraine, anxiety, generalized  seizure, hyperlipidemia, hemorrhoids who presents with concerns for bloating.  She had an EGD and colonoscopy in September 2019 which were unrevealing.  Duodenal biopsy negative for celiac disease.    Endoscopy History:  EGD -September 2019 normal.  Gastric biopsy negative for H. pylori and gastric intestinal metaplasia.  Duodenal biopsy negative for celiac disease.  Colonoscopy -September 2019 with adequate bowel prep showed only sigmoid diverticulosis and random colon biopsy negative for microscopic colitis.    REVIEW OF SYSTEMS:    CONSTITUTIONAL: Denies any fever, chills, rigors, and weight loss.  HEENT: Denies hearing loss or visual disturbances.  CARDIOVASCULAR: No chest pain or palpitations.   RESPIRATORY: Denies any cough, hemoptysis, shortness of breath or dyspnea on exertion.  GASTROINTESTINAL: As noted in the History of Present Illness.   GENITOURINARY: No problems with urination. Denies any hematuria or dysuria.  NEUROLOGIC: No dizziness or vertigo, denies headaches.   MUSCULOSKELETAL: Denies any muscle or joint pain.   SKIN: Denies skin rashes or itching.   ENDOCRINE: Denies excessive thirst. Denies intolerance to heat or cold.  PSYCHOSOCIAL: Denies depression or anxiety. Denies any recent memory loss.       Historical Information  Past Medical History:   Diagnosis Date   • Cervical radiculopathy     RESOLVED: [id number] buldging disc   • Hyperlipidemia    • Kidney stone    • Migraines    • Seizure (HCC)     age 24     Past Surgical History:   Procedure Laterality Date   • ADENOIDECTOMY     • COLONOSCOPY     • EGD AND COLONOSCOPY  09/18/2019   • LITHOTRIPSY  2006    RENAL   • OVARIAN CYST REMOVAL  2012   • [place] HYSTEROSCOPY BX ENDOMETRIUM&/POLYPC W/WO D&C N/A 10/28/2019    Procedure: OPERATIVE HYSTEROSCOPY (MYOSURE),POLYPECTOMY,D AND C;  Surgeon: Roel Rothman MD;  [facility]: [facility];  Service: Gynecology     Social History  Social History     Substance and Sexual Activity   Alcohol Use Not Currently     Comment: rare     Social History     Substance and Sexual Activity   Drug Use Not Currently   • Types: Marijuana    Comment: gummy     Social History     Tobacco Use   Smoking Status Former   • Current packs/day: 0.00   • Average packs/day: 1 pack/day for 20.0 years (20.0 ttl pk-yrs)   • Types: Cigarettes   • Start date: 6/15/1992   • Quit date: 2007   • Years since quittin.1   • Passive exposure: Never   Smokeless Tobacco Never     Family History   Problem Relation Age of Onset   • Multiple sclerosis Mother    • Colon cancer Maternal Grandmother 70   • Rheum arthritis Maternal Grandmother    • Arthritis Family    • Colon cancer Family    • Osteoporosis Family    • Anxiety disorder Father    • Chromosomal disorder Cousin    • No Known Problems Sister    • No Known Problems Daughter    • Skin cancer Maternal Grandfather 65        not sure of exact age.   • No Known Problems Paternal Grandmother    • No Known Problems Paternal Grandfather    • No Known Problems Daughter    • No Known Problems Son    • No Known Problems Maternal Aunt    • No Known Problems Maternal Aunt    • No Known Problems Paternal Aunt        Meds/Allergies      Current Outpatient Medications:   •  Aimovig 140 MG/ML SOAJ  •  carBAMazepine (CARBATROL) 300 MG 12 hr capsule  •  Casanthranol-Docusate Sodium (STOOL SOFTENER PLUS PO)  •  cyclobenzaprine (FLEXERIL) 10 mg tablet  •  meloxicam (MOBIC) 15 mg tablet  •  rosuvastatin (CRESTOR) 20 MG tablet  •  SUMAtriptan (IMITREX) 100 mg tablet  •  triamcinolone (KENALOG) 0.025 % cream  •  valACYclovir (VALTREX) 500 mg tablet  •  benzonatate (TESSALON) 200 MG capsule  •  ibuprofen (MOTRIN) 200 mg tablet  •  LORazepam (ATIVAN) 2 mg tablet  •  methocarbamol (ROBAXIN) 500 mg tablet  •  omeprazole (PriLOSEC) 20 mg delayed release capsule  •  predniSONE 10 mg tablet  •  predniSONE 20 mg tablet  •  tiZANidine (ZANAFLEX) 4 mg tablet    Allergies   Allergen Reactions   • Ciprofloxacin Lightheadedness      "dizzy   • Phenytoin Rash     Reaction Date: 05Oct2010;            Objective    Blood pressure 133/83, pulse 83, height 5' 1\" (1.549 m), weight 64.4 kg (142 lb), SpO2 98%, not currently breastfeeding. Body mass index is 26.83 kg/m².        PHYSICAL EXAM:      General Appearance:   Alert, cooperative, no distress, appears stated age   HEENT:   Normocephalic, atraumatic, anicteric.     Neck:  Supple, symmetrical   Lungs:   Clear to auscultation bilaterally; no rales, rhonchi or wheezing; respirations unlabored    Heart::   Regular rate and rhythm; no murmur, rub, or gallop.   Abdomen:   Soft, epigastric and B/L lower TTP, non-distended; normal bowel sounds; no masses, no organomegaly    Genitalia:   Deferred    Rectal:   Deferred    Extremities:  No cyanosis, clubbing or edema    Pulses:  Not assessed   Skin:  No jaundice, rashes, or lesions    Lymph nodes:  Not assessed       Lab Results:   No visits with results within 1 Day(s) from this visit.   Latest known visit with results is:   Orders Only on 06/20/2024   Component Date Value   • Glucose, Random 06/20/2024 86    • BUN 06/20/2024 14    • Creatinine 06/20/2024 0.65    • eGFR 06/20/2024 109    • SL AMB BUN/CREATININE RA* 06/20/2024 22    • Sodium 06/20/2024 139    • Potassium 06/20/2024 4.7    • Chloride 06/20/2024 104    • CO2 06/20/2024 24    • CALCIUM 06/20/2024 8.8    • Protein, Total 06/20/2024 5.8 (L)    • Albumin 06/20/2024 4.0    • Globulin, Total 06/20/2024 1.8    • TOTAL BILIRUBIN 06/20/2024 0.2    • Alk Phos Isoenzymes 06/20/2024 64    • AST 06/20/2024 17    • ALT 06/20/2024 13    • Cholesterol, Total 06/20/2024 170    • Triglycerides 06/20/2024 51    • HDL 06/20/2024 102    • VLDL Cholesterol Calcula* 06/20/2024 11    • LDL Calculated 06/20/2024 57    • LDl/HDL Ratio 06/20/2024 0.6    • Interpretation 06/20/2024 Note          Radiology Results:   No results found.    Michael Fam PA-C  6/27/2024  1:45 PM  Sign " when Signing Visit  [facility] - Outpatient Consultation  Karly Howell 48 y.o. female MRN: [id number]  Encounter: [id number]          ASSESSMENT AND PLAN:      1. Bloating  -EGD September 2019 normal with gastric biopsy negative for H. pylori and gastric intestinal metaplasia.  Duodenal biopsy negative for celiac disease    2. Screen colon  -Last colonoscopy September 2019 with adequate bowel prep showed only sigmoid diverticulosis and random colon biopsy was negative for microscopic colitis  -Next colonoscopy would be due at age 50    ______________________________________________________________________    Chief Complaint: Bloating    HPI: This is a 48-year-old female with past medical history of migraine, anxiety, generalized seizure, hyperlipidemia, hemorrhoids who presents with concerns for bloating.  She had an EGD and colonoscopy in September 2019 which were unrevealing.  Duodenal biopsy negative for celiac disease.    Endoscopy History:  EGD -September 2019 normal.  Gastric biopsy negative for H. pylori and gastric intestinal metaplasia.  Duodenal biopsy negative for celiac disease.  Colonoscopy -September 2019 with adequate bowel prep showed only sigmoid diverticulosis and random colon biopsy negative for microscopic colitis.    REVIEW OF SYSTEMS:    CONSTITUTIONAL: Denies any fever, chills, rigors, and weight loss.  HEENT: Denies hearing loss or visual disturbances.  CARDIOVASCULAR: No chest pain or palpitations.   RESPIRATORY: Denies any cough, hemoptysis, shortness of breath or dyspnea on exertion.  GASTROINTESTINAL: As noted in the History of Present Illness.   GENITOURINARY: No problems with urination. Denies any hematuria or dysuria.  NEUROLOGIC: No dizziness or vertigo, denies headaches.   MUSCULOSKELETAL: Denies any muscle or joint pain.   SKIN: Denies skin rashes or itching.   ENDOCRINE: Denies excessive thirst. Denies intolerance to heat or cold.  PSYCHOSOCIAL: Denies  depression or anxiety. Denies any recent memory loss.       Historical Information  Past Medical History:   Diagnosis Date   • Cervical radiculopathy     RESOLVED: [id number] buldging disc   • Hyperlipidemia    • Kidney stone    • Migraines    • Seizure (HCC)     age 24     Past Surgical History:   Procedure Laterality Date   • ADENOIDECTOMY     • COLONOSCOPY     • LITHOTRIPSY      RENAL   • OVARIAN CYST REMOVAL     • [place] HYSTEROSCOPY BX ENDOMETRIUM&/POLYPC W/WO D&C N/A 10/28/2019    Procedure: OPERATIVE HYSTEROSCOPY (MYOSURE),POLYPECTOMY,D AND C;  Surgeon: Roel Rothman MD;  [facility];  Service: Gynecology     Social History  Social History     Substance and Sexual Activity   Alcohol Use Yes    Comment: rare     Social History     Substance and Sexual Activity   Drug Use Never     Social History     Tobacco Use   Smoking Status Former   • Current packs/day: 0.00   • Average packs/day: 1 pack/day for 20.0 years (20.0 ttl pk-yrs)   • Types: Cigarettes   • Start date: 6/15/1992   • Quit date: 2007   • Years since quittin.1   • Passive exposure: Never   Smokeless Tobacco Never     Family History   Problem Relation Age of Onset   • Multiple sclerosis Mother    • Colon cancer Maternal Grandmother 70   • Rheum arthritis Maternal Grandmother    • Arthritis Family    • Colon cancer Family    • Osteoporosis Family    • Anxiety disorder Father    • Chromosomal disorder Cousin    • No Known Problems Sister    • No Known Problems Daughter    • Skin cancer Maternal Grandfather 65        not sure of exact age.   • No Known Problems Paternal Grandmother    • No Known Problems Paternal Grandfather    • No Known Problems Daughter    • No Known Problems Son    • No Known Problems Maternal Aunt    • No Known Problems Maternal Aunt    • No Known Problems Paternal Aunt        Meds/Allergies      Current Outpatient Medications:   •  Aimovig 140 MG/ML SOAJ  •  benzonatate (TESSALON) 200 MG capsule  •   carBAMazepine (CARBATROL) 300 MG 12 hr capsule  •  cyclobenzaprine (FLEXERIL) 10 mg tablet  •  ibuprofen (MOTRIN) 200 mg tablet  •  LORazepam (ATIVAN) 2 mg tablet  •  meloxicam (MOBIC) 15 mg tablet  •  methocarbamol (ROBAXIN) 500 mg tablet  •  methylPREDNISolone 4 MG tablet therapy pack  •  omeprazole (PriLOSEC) 20 mg delayed release capsule  •  predniSONE 10 mg tablet  •  predniSONE 20 mg tablet  •  rosuvastatin (CRESTOR) 20 MG tablet  •  SUMAtriptan (IMITREX) 100 mg tablet  •  tiZANidine (ZANAFLEX) 4 mg tablet  •  triamcinolone (KENALOG) 0.025 % cream  •  valACYclovir (VALTREX) 500 mg tablet    Allergies   Allergen Reactions   • Ciprofloxacin Lightheadedness     dizzy   • Phenytoin Rash     Reaction Date: 05Oct2010;            Objective    not currently breastfeeding. There is no height or weight on file to calculate BMI.        PHYSICAL EXAM:      General Appearance:   Alert, cooperative, no distress, appears stated age   HEENT:   Normocephalic, atraumatic, anicteric.     Neck:  Supple, symmetrical   Lungs:   Clear to auscultation bilaterally; no rales, rhonchi or wheezing; respirations unlabored    Heart::   Regular rate and rhythm; no murmur, rub, or gallop.   Abdomen:   Soft, non-tender, non-distended; normal bowel sounds; no masses, no organomegaly    Genitalia:   Deferred    Rectal:   Deferred    Extremities:  No cyanosis, clubbing or edema    Pulses:  Not assessed   Skin:  No jaundice, rashes, or lesions    Lymph nodes:  Not assessed       Lab Results:   No visits with results within 1 Day(s) from this visit.   Latest known visit with results is:   Orders Only on 06/20/2024   Component Date Value   • Glucose, Random 06/20/2024 86    • BUN 06/20/2024 14    • Creatinine 06/20/2024 0.65    • eGFR 06/20/2024 109    • SL AMB BUN/CREATININE RA* 06/20/2024 22    • Sodium 06/20/2024 139    • Potassium 06/20/2024 4.7    • Chloride 06/20/2024 104    • CO2 06/20/2024 24    • CALCIUM 06/20/2024 8.8    • Protein, Total  06/20/2024 5.8 (L)    • Albumin 06/20/2024 4.0    • Globulin, Total 06/20/2024 1.8    • TOTAL BILIRUBIN 06/20/2024 0.2    • Alk Phos Isoenzymes 06/20/2024 64    • AST 06/20/2024 17    • ALT 06/20/2024 13    • Cholesterol, Total 06/20/2024 170    • Triglycerides 06/20/2024 51    • HDL 06/20/2024 102    • VLDL Cholesterol Calcula* 06/20/2024 11    • LDL Calculated 06/20/2024 57    • LDl/HDL Ratio 06/20/2024 0.6    • Interpretation 06/20/2024 Note          Radiology Results:   No results found.    Michael Fam PA-C

## 2024-06-27 NOTE — PROGRESS NOTES
[facility] - Outpatient Consultation  Karly Howell 48 y.o. female MRN: [id number]  Encounter: [id number]          ASSESSMENT AND PLAN:      1. Bloating  -EGD September 2019 normal with gastric biopsy negative for H. pylori and gastric intestinal metaplasia.  Duodenal biopsy negative for celiac disease    2. Nausea  - Plan EGD due to bloating and nausea  - I have reviewed the risks of endoscopy which include but are not limited to; anesthesia complications, injury/perforation of the bowel, infection and bleeding.  Patient given the opportunity to review the full consent form.    3. Change in bowels  - due to change in bowels and family history of colon cancer and autoimmune disease will plan colonoscopy  - I have reviewed the risks of endoscopy which include but are not limited to; anesthesia complications, injury/perforation of the bowel, infection and bleeding.  Patient given the opportunity to review the full consent form.    4. Screen colon  -Last colonoscopy September 2019 with adequate bowel prep showed only sigmoid diverticulosis and random colon biopsy was negative for microscopic colitis  -Next colonoscopy would be due at age 50      ______________________________________________________________________    Chief Complaint: Bloating    HPI: This is a 48-year-old female with past medical history of migraine, anxiety, generalized seizure, hyperlipidemia, hemorrhoids who presents with concerns for bloating.  She had an EGD and colonoscopy in September 2019 which were unrevealing.  Duodenal biopsy negative for celiac disease.    Patient went to her primary care recently was told to follow-up with GI due to her bloating and nausea.  She has a family history of maternal grandmother with colorectal cancer and a cousin with Crohn's.  Bloating has been occurring over a step past several months and she has not noted that it is increased with eating.  She does have some constipation since  taking an injectable medicine for migraine and this is being helped with a stool softener.  She is does note some straining with her bowel movements but no bleeding.  She notes that she has an aunt with gluten intolerance.  Due to some neck pain she has been taking large doses of ibuprofen 800 mg up to 3 times a day for the past 5 weeks which she has now stopped.  But she is currently on meloxicam 15 mg daily.  No vomiting.  She was given omeprazole due to her complaints of nausea but only took it for couple days.    No pacemaker or defibrillator implanted.   No chronic kidney disease or solitary kidney.  Not on any supplemental oxygen at night.  Not on any antiplatelet or anticoagulants.      Endoscopy History:  EGD -September 2019 normal.  Gastric biopsy negative for H. pylori and gastric intestinal metaplasia.  Duodenal biopsy negative for celiac disease.  Colonoscopy -September 2019 with adequate bowel prep showed only sigmoid diverticulosis and random colon biopsy negative for microscopic colitis.    REVIEW OF SYSTEMS:    CONSTITUTIONAL: Denies any fever, chills, rigors, and weight loss.  HEENT: Denies hearing loss or visual disturbances.  CARDIOVASCULAR: No chest pain or palpitations.   RESPIRATORY: Denies any cough, hemoptysis, shortness of breath or dyspnea on exertion.  GASTROINTESTINAL: As noted in the History of Present Illness.   GENITOURINARY: No problems with urination. Denies any hematuria or dysuria.  NEUROLOGIC: No dizziness or vertigo, denies headaches.   MUSCULOSKELETAL: Denies any muscle or joint pain.   SKIN: Denies skin rashes or itching.   ENDOCRINE: Denies excessive thirst. Denies intolerance to heat or cold.  PSYCHOSOCIAL: Denies depression or anxiety. Denies any recent memory loss.       Historical Information   Past Medical History:   Diagnosis Date    Cervical radiculopathy     RESOLVED: [id number] buldging disc    Hyperlipidemia     Kidney stone     Migraines     Seizure (HCC)     age 24      Past Surgical History:   Procedure Laterality Date    ADENOIDECTOMY      COLONOSCOPY      EGD AND COLONOSCOPY  2019    LITHOTRIPSY  2006    RENAL    OVARIAN CYST REMOVAL  2012    [place] HYSTEROSCOPY BX ENDOMETRIUM&/POLYPC W/WO D&C N/A 10/28/2019    Procedure: OPERATIVE HYSTEROSCOPY (MYOSURE),POLYPECTOMY,D AND C;  Surgeon: Roel Rothman MD;  [facility];  Service: Gynecology     Social History   Social History     Substance and Sexual Activity   Alcohol Use Not Currently    Comment: rare     Social History     Substance and Sexual Activity   Drug Use Not Currently    Types: Marijuana    Comment: gummy     Social History     Tobacco Use   Smoking Status Former    Current packs/day: 0.00    Average packs/day: 1 pack/day for 20.0 years (20.0 ttl pk-yrs)    Types: Cigarettes    Start date: 6/15/1992    Quit date: 2007    Years since quittin.1    Passive exposure: Never   Smokeless Tobacco Never     Family History   Problem Relation Age of Onset    Multiple sclerosis Mother     Colon cancer Maternal Grandmother 70    Rheum arthritis Maternal Grandmother     Arthritis Family     Colon cancer Family     Osteoporosis Family     Anxiety disorder Father     Chromosomal disorder Cousin     No Known Problems Sister     No Known Problems Daughter     Skin cancer Maternal Grandfather 65        not sure of exact age.    No Known Problems Paternal Grandmother     No Known Problems Paternal Grandfather     No Known Problems Daughter     No Known Problems Son     No Known Problems Maternal Aunt     No Known Problems Maternal Aunt     No Known Problems Paternal Aunt        Meds/Allergies       Current Outpatient Medications:     Aimovig 140 MG/ML SOAJ    carBAMazepine (CARBATROL) 300 MG 12 hr capsule    Casanthranol-Docusate Sodium (STOOL SOFTENER PLUS PO)    cyclobenzaprine (FLEXERIL) 10 mg tablet    meloxicam (MOBIC) 15 mg tablet    polyethylene glycol (GOLYTELY) 4000 mL solution    rosuvastatin (CRESTOR) 20  "MG tablet    SUMAtriptan (IMITREX) 100 mg tablet    triamcinolone (KENALOG) 0.025 % cream    valACYclovir (VALTREX) 500 mg tablet    benzonatate (TESSALON) 200 MG capsule    ibuprofen (MOTRIN) 200 mg tablet    LORazepam (ATIVAN) 2 mg tablet    methocarbamol (ROBAXIN) 500 mg tablet    omeprazole (PriLOSEC) 20 mg delayed release capsule    predniSONE 10 mg tablet    predniSONE 20 mg tablet    tiZANidine (ZANAFLEX) 4 mg tablet    Allergies   Allergen Reactions    Ciprofloxacin Lightheadedness     dizzy    Phenytoin Rash     Reaction Date: 05Oct2010;            Objective     Blood pressure 133/83, pulse 83, height 5' 1\" (1.549 m), weight 64.4 kg (142 lb), SpO2 98%, not currently breastfeeding. Body mass index is 26.83 kg/m².        PHYSICAL EXAM:      General Appearance:   Alert, cooperative, no distress, appears stated age   HEENT:   Normocephalic, atraumatic, anicteric.     Neck:  Supple, symmetrical   Lungs:   Clear to auscultation bilaterally; no rales, rhonchi or wheezing; respirations unlabored    Heart::   Regular rate and rhythm; no murmur, rub, or gallop.   Abdomen:   Soft, epigastric and B/L lower TTP, non-distended; normal bowel sounds; no masses, no organomegaly    Genitalia:   Deferred    Rectal:   Deferred    Extremities:  No cyanosis, clubbing or edema    Pulses:  Not assessed   Skin:  No jaundice, rashes, or lesions    Lymph nodes:  Not assessed       Lab Results:   No visits with results within 1 Day(s) from this visit.   Latest known visit with results is:   Orders Only on 06/20/2024   Component Date Value    Glucose, Random 06/20/2024 86     BUN 06/20/2024 14     Creatinine 06/20/2024 0.65     eGFR 06/20/2024 109     SL AMB BUN/CREATININE RA* 06/20/2024 22     Sodium 06/20/2024 139     Potassium 06/20/2024 4.7     Chloride 06/20/2024 104     CO2 06/20/2024 24     CALCIUM 06/20/2024 8.8     Protein, Total 06/20/2024 5.8 (L)     Albumin 06/20/2024 4.0     Globulin, Total 06/20/2024 1.8     TOTAL " BILIRUBIN 06/20/2024 0.2     Alk Phos Isoenzymes 06/20/2024 64     AST 06/20/2024 17     ALT 06/20/2024 13     Cholesterol, Total 06/20/2024 170     Triglycerides 06/20/2024 51     HDL 06/20/2024 102     VLDL Cholesterol Calcula* 06/20/2024 11     LDL Calculated 06/20/2024 57     LDl/HDL Ratio 06/20/2024 0.6     Interpretation 06/20/2024 Note          Radiology Results:   No results found.    Michael Fam PA-C

## 2024-06-28 ENCOUNTER — OFFICE VISIT (OUTPATIENT)
Dept: FAMILY MEDICINE CLINIC | Facility: CLINIC | Age: 49
End: 2024-06-28
Payer: COMMERCIAL

## 2024-06-28 VITALS
TEMPERATURE: 97.9 F | BODY MASS INDEX: 26.58 KG/M2 | WEIGHT: 140.8 LBS | RESPIRATION RATE: 18 BRPM | HEART RATE: 84 BPM | DIASTOLIC BLOOD PRESSURE: 72 MMHG | HEIGHT: 61 IN | SYSTOLIC BLOOD PRESSURE: 128 MMHG

## 2024-06-28 DIAGNOSIS — Z91.09 SENSITIVITY TO SUNLIGHT: ICD-10-CM

## 2024-06-28 DIAGNOSIS — R21 RASH: Primary | ICD-10-CM

## 2024-06-28 PROCEDURE — 99214 OFFICE O/P EST MOD 30 MIN: CPT | Performed by: FAMILY MEDICINE

## 2024-06-28 NOTE — PROGRESS NOTES
Ambulatory Visit  Name: Karly Howell      : 1975      MRN: [id number]  Encounter Provider: Angelica Ruiz MD  Encounter Date: 2024   Encounter department: [facility]    Assessment & Plan   1. Rash  -     FEMI Screen w/ Reflex to Titer/Pattern; Future  -     Anti-DNA antibody, double-stranded; Future  -     Anti-scleroderma antibody; Future  -     C-reactive protein; Future  -     Centromere Antibody; Future  -     Cardiolipin antibody; Future  -     Sm/RNP Antibody; Future  -     Sjogren's Antibodies; Future  -     Sedimentation rate, automated; Future  -     RF Screen w/ Reflex to Titer; Future  -     Anti-DNA antibody, double-stranded  -     Anti-scleroderma antibody  -     C-reactive protein  -     Centromere Antibody  -     Cardiolipin antibody  -     Sjogren's Antibodies  -     Sedimentation rate, automated  2. Sensitivity to sunlight  -     FEMI Screen w/ Reflex to Titer/Pattern; Future  -     Anti-DNA antibody, double-stranded; Future  -     Anti-scleroderma antibody; Future  -     C-reactive protein; Future  -     Centromere Antibody; Future  -     Cardiolipin antibody; Future  -     Sm/RNP Antibody; Future  -     Sjogren's Antibodies; Future  -     Sedimentation rate, automated; Future  -     RF Screen w/ Reflex to Titer; Future  -     Anti-DNA antibody, double-stranded  -     Anti-scleroderma antibody  -     C-reactive protein  -     Centromere Antibody  -     Cardiolipin antibody  -     Sjogren's Antibodies  -     Sedimentation rate, automated    Karly was seen earlier this week for a rash on her elbows and ankle which was thought to be 2/2 contact dermatitis. She is unsure what caused it, but it improved with the use of topical steroid and topical antibiotic cream. She reports having reactions to sunscreen or possibly the sun. Will get labs for autoimmune diseases. She does have follow up scheduled with derm.   She is concerned that her statin is causing sun sensitivity. We  "discussed risks of stopping statin. She can do a short trial of holding statin, but can discuss it in more detail with her PCP Dr. Lombardi at upcoming visit.      History of Present Illness     Rash  This is a new problem. Episode onset: 2 weeks ago. The problem has been gradually improving since onset. Location: elbows bilaterally, ankles. The rash is characterized by redness and itchiness. Past treatments include topical steroids.       Review of Systems   Skin:  Positive for rash.       Objective     /72   Pulse 84   Temp 97.9 °F (36.6 °C)   Resp 18   Ht 5' 1\" (1.549 m)   Wt 63.9 kg (140 lb 12.8 oz)   BMI 26.60 kg/m²     Physical Exam  Constitutional:       General: She is not in acute distress.     Appearance: Normal appearance. She is not ill-appearing, toxic-appearing or diaphoretic.   HENT:      Head: Normocephalic and atraumatic.   Eyes:      General:         Right eye: No discharge.         Left eye: No discharge.      Conjunctiva/sclera: Conjunctivae normal.   Pulmonary:      Effort: Pulmonary effort is normal.   Skin:     Findings: Rash (mild erythema and dryness on the elbows and ankles- improved) present.   Neurological:      Mental Status: She is alert.   Psychiatric:         Mood and Affect: Mood normal.         Behavior: Behavior normal.         Thought Content: Thought content normal.         Judgment: Judgment normal.       Administrative Statements     "

## 2024-07-02 ENCOUNTER — OFFICE VISIT (OUTPATIENT)
Dept: PHYSICAL THERAPY | Facility: CLINIC | Age: 49
End: 2024-07-02
Payer: COMMERCIAL

## 2024-07-02 DIAGNOSIS — M54.12 CERVICAL RADICULOPATHY: Primary | ICD-10-CM

## 2024-07-02 DIAGNOSIS — M54.2 CERVICAL PAIN (NECK): ICD-10-CM

## 2024-07-02 PROCEDURE — 97140 MANUAL THERAPY 1/> REGIONS: CPT

## 2024-07-02 PROCEDURE — 97530 THERAPEUTIC ACTIVITIES: CPT

## 2024-07-02 NOTE — PROGRESS NOTES
Daily Note     Today's date: 2024  Patient name: Karly Howell  : 1975  MRN: [id number]  Referring provider: Lombardi, Frank, DO  Dx:   Encounter Diagnosis     ICD-10-CM    1. Cervical radiculopathy  M54.12       2. Cervical pain (neck)  M54.2                      Subjective: Patient reports pain in left arm, but states she forgot to take her pain medication. She reports overall improvement as well as improved sleeping at night. She is however still experiencing pain when driving and after last PT session.     Objective: See treatment diary below      Assessment: Tolerated treatment fair. Noted increased neural tension in left UE when gliding median nerve. She also reports increased pain with median nerve gliding. Attempted chin tucks in supine, but she reports neck pain. Educated patient on centralization with repeated cervical retractions, which she was very hesitant about, but overall verbally understood. Performed Y tape on left shoulder as she reported improvement in symptoms from last time using the tape. Patient demonstrated fatigue post treatment, exhibited good technique with therapeutic exercises, and would benefit from continued PT      Plan: Continue per plan of care.      Insurance:  AMA/CMS Eval/ Re-eval POC expires FOTO Auth #/ Referral # Total units  Start date  Expiration date Extension  Visit limitation?  PT only or  PT+OT? Co-Insurance   [place]: CMS Eval 24   Auth is REQ    12 PT only none       [id number]     12                                                       Date 24   Visit Number 8 [id number] 10 re-ev 11 12    Manual         Thoracic PA          CTJ     Manual traction x 5 min    SOR x 5 min     STM    UT and posterior scap x 10 min UT, Levator, paraspinals UT, LS   Nerve gliding      Median N 2x10 Median N 2x10    Shoulder mobs            K tape L shoulder Y tape    Tape L shoulder Y tape   TherEx         SNAGs       Scap squeezes  20 Scap squeezes 20    Cat cow      Bilat ER in sitting  20    Open books      S/L rotation open books  10x  5s S/L rotation open books 10x 5s    Thoracic ext          Thread the needle          Doorway stretch          Wall slides + lift off  GTB x10        Rows   Bent over row x20       Shoulder ext          Shoulder isos         Shoulder hang         Traction  Traction: 30 min w/ setup (10 deg angle of pull)  - max = 20#  - min = 10# Traction: 18 min w/ setup (10 deg angle of pull)  - max = 20#  - min = 10#  Traction: 20 min w/ setup (10 deg angle of pull)  - max = 20#  - min = 10#              Neuro Re-Ed         Chin tucks          Scap retract         Scap depress          Median N. tensioners         SL shoulder abd          SL shoulder horizontal abd          Is Ts Ys  Bent over Ts x20                TherAct   Assessment   Pt Edu; centralization and neural mobility x 10 min   Patient education                                             Gait Training                                    Modalities                      Precautions: h/o seizures (controlled by medication)  Past Medical History:   Diagnosis Date    Cervical radiculopathy     RESOLVED: [id number] buldging disc    Hyperlipidemia     Kidney stone     Migraines     Seizure (HCC)     age 24         Precautions: h/o seizures (controlled by medication)  Past Medical History:   Diagnosis Date    Cervical radiculopathy     RESOLVED: [id number] buldging disc    Hyperlipidemia     Kidney stone     Migraines     Seizure (HCC)     age 24

## 2024-07-05 ENCOUNTER — OFFICE VISIT (OUTPATIENT)
Dept: PHYSICAL THERAPY | Facility: CLINIC | Age: 49
End: 2024-07-05
Payer: COMMERCIAL

## 2024-07-05 DIAGNOSIS — M54.12 CERVICAL RADICULOPATHY: Primary | ICD-10-CM

## 2024-07-05 DIAGNOSIS — M54.2 CERVICAL PAIN (NECK): ICD-10-CM

## 2024-07-05 PROCEDURE — 97140 MANUAL THERAPY 1/> REGIONS: CPT

## 2024-07-05 PROCEDURE — 97530 THERAPEUTIC ACTIVITIES: CPT

## 2024-07-05 NOTE — PROGRESS NOTES
Daily Note     Today's date: 2024  Patient name: Karly Howell  : 1975  MRN: [id number]  Referring provider: Lombardi, Frank, DO  Dx:   Encounter Diagnosis     ICD-10-CM    1. Cervical radiculopathy  M54.12       2. Cervical pain (neck)  M54.2                      Subjective: Patient reports continued pain in neck and left shoulder, but milder than before. She has been doing her chin tucks, she was able to perform <10 on Wednesday and 10 on Thursday.       Objective: See treatment diary below      Assessment: Tolerated treatment fair. During discussion of last visit, patient noted that median nerve glides were not helping. Upon further investigation, patient's pain in LUE follows the path of radial nerve. When performing nerve glides with a radial bias, patient noted decrease in forearm pain. Patient was educated on nerve paths within brachial plexus for better understanding of nerve pain. Applied tape on patient's shoulder again for superior glide. Re-ed patient about the tape and when to remove it, especially with the warmer weather approaching. She verbally complied with no additional concerns or questions. Patient demonstrated fatigue post treatment, exhibited good technique with therapeutic exercises, and would benefit from continued PT to reduce limitation secondary to pain.      Plan: Continue per plan of care.      Insurance:  AMA/CMS Eval/ Re-eval POC expires FOTO Auth #/ Referral # Total units  Start date  Expiration date Extension  Visit limitation?  PT only or  PT+OT? Co-Insurance   [place]: CMS Eval 24   Auth is REQ    12 PT only none       [id number]     12                                                       Date 24   Visit Number 9 10 re-ev 11 12      Manual         Thoracic PA          CTJ    Manual traction x 5 min    SOR x 5 min   Manual traction x5 min    STM   UT and posterior scap x 10 min UT, Levator,  paraspinals UT, LS    Nerve gliding     Median N 2x10 Median N 2x10  Radial nerve    Shoulder mobs           K tape L shoulder Y tape    Tape L shoulder Y tape Tape L shoulder Y tape    TherEx         SNAGs     Scap squeezes  20 Scap squeezes 20  Scap squeezes 20x    Cat cow     Bilat ER in sitting  20     Open books     S/L rotation open books  10x  5s S/L rotation open books 10x 5s  Standing rotation open books 10x 5s    Thoracic ext          Thread the needle          Doorway stretch          Wall slides + lift off GTB x10         Rows  Bent over row x20     GTB 2x10   Shoulder ext       GTB 2x10   Shoulder isos         Shoulder hang         Traction  Traction: 18 min w/ setup (10 deg angle of pull)  - max = 20#  - min = 10#  Traction: 20 min w/ setup (10 deg angle of pull)  - max = 20#  - min = 10#               Neuro Re-Ed      2x10    Chin tucks          Scap retract         Scap depress          Median N. tensioners         SL shoulder abd          SL shoulder horizontal abd          Is Ts Ys Bent over Ts x20                 TherAct  Assessment   Pt Edu; centralization and neural mobility x 10 min Pt edu: brachial plexus and nerve pain x 10 min    Patient education                                             Gait Training                                    Modalities                      Precautions: h/o seizures (controlled by medication)  Past Medical History:   Diagnosis Date    Cervical radiculopathy     RESOLVED: [id number] buldging disc    Hyperlipidemia     Kidney stone     Migraines     Seizure (HCC)     age 24         Precautions: h/o seizures (controlled by medication)  Past Medical History:   Diagnosis Date    Cervical radiculopathy     RESOLVED: [id number] buldging disc    Hyperlipidemia     Kidney stone     Migraines     Seizure (HCC)     age 24

## 2024-07-08 LAB
ANA SPECKLED TITR SER: NORMAL {TITER}
ANA TITR SER IF: POSITIVE {TITER}
CARDIOLIPIN IGA SER IA-ACNC: <9 APL U/ML (ref 0–11)
CARDIOLIPIN IGG SER IA-ACNC: 12 GPL U/ML (ref 0–14)
CARDIOLIPIN IGM SER IA-ACNC: 27 MPL U/ML (ref 0–12)
CCP IGA+IGG SERPL IA-ACNC: <20 UNITS
CENTROMERE B AB SER-ACNC: <0.2 AI (ref 0–0.9)
CRP SERPL-MCNC: 11 MG/L (ref 0–10)
DSDNA AB SER-ACNC: 1 IU/ML (ref 0–9)
ENA RNP AB SER-ACNC: 0.7 AI (ref 0–0.9)
ENA SCL70 AB SER-ACNC: <0.2 AI (ref 0–0.9)
ENA SM AB SER-ACNC: <0.2 AI (ref 0–0.9)
ENA SS-A AB SER-ACNC: <0.2 AI (ref 0–0.9)
ENA SS-B AB SER-ACNC: 0.9 AI (ref 0–0.9)
ERYTHROCYTE [SEDIMENTATION RATE] IN BLOOD BY WESTERGREN METHOD: 18 MM/HR (ref 0–32)
REFLEX TEST INFORMATION: NORMAL
RHEUMATOID FACT SERPL-ACNC: <10 IU/ML
RHEUMATOID FACTOR BY TURBIDIMETRY RDL: NORMAL IU/ML
SL AMB NOTE:: NORMAL

## 2024-07-09 ENCOUNTER — OFFICE VISIT (OUTPATIENT)
Dept: PHYSICAL THERAPY | Facility: CLINIC | Age: 49
End: 2024-07-09
Payer: COMMERCIAL

## 2024-07-09 DIAGNOSIS — M54.12 CERVICAL RADICULOPATHY: Primary | ICD-10-CM

## 2024-07-09 DIAGNOSIS — M54.2 CERVICAL PAIN (NECK): ICD-10-CM

## 2024-07-09 DIAGNOSIS — R11.0 NAUSEA: ICD-10-CM

## 2024-07-09 PROCEDURE — 97110 THERAPEUTIC EXERCISES: CPT

## 2024-07-09 PROCEDURE — 97112 NEUROMUSCULAR REEDUCATION: CPT

## 2024-07-09 RX ORDER — OMEPRAZOLE 20 MG/1
20 CAPSULE, DELAYED RELEASE ORAL DAILY
Qty: 90 CAPSULE | Refills: 0 | Status: SHIPPED | OUTPATIENT
Start: 2024-07-09

## 2024-07-09 NOTE — PROGRESS NOTES
"Daily Note     Today's date: 2024  Patient name: Karly Howell  : 1975  MRN: [id number]  Referring provider: Lombardi, Frank, DO  Dx:   Encounter Diagnosis     ICD-10-CM    1. Cervical radiculopathy  M54.12       2. Cervical pain (neck)  M54.2                      Subjective: Patient spent Saturday in [facility], which she reported no pain and stated she \"felt great\".  night she had taken the tape off and on Monday, she reported increase in neck pain as she was driving to [place]. She states her MRI got moved back to  after missing f/u with Dr. Tompkins. She has her f/u rescheduled for 7/15.       Objective: See treatment diary below      Assessment: Tolerated treatment well. Today's session included scapular resetting with shoulder abduction followed by periscapular/RTC strengthening. Required verbal cueing for scapular retraction and depression during prone T's and Ys. She tolerated well, but insisted she was \"going to have a bad night\". Patient was encouraged to avoid negative thinking to eliminate increased stress about condition. Patient demonstrated fatigue post treatment, exhibited good technique with therapeutic exercises, and would benefit from continued PT to reduce limitation secondary to pain.       Plan: Continue per plan of care.  Progress treatment as tolerated.       Insurance:  AMA/CMS Eval/ Re-eval POC expires FOTO Auth #/ Referral # Total units  Start date  Expiration date Extension  Visit limitation?  PT only or  PT+OT? Co-Insurance   [place]: CMS Eval 24   Auth is REQ    12 PT only none       [id number]     12                                                       Date 24   Visit Number 10 re-ev 11 12 13/24 14/24   15/24   Manual         Thoracic PA          CTJ   Manual traction x 5 min    SOR x 5 min   Manual traction x5 min     STM  UT and posterior scap x 10 min UT, Levator, paraspinals UT, LS     Nerve gliding    " Median N 2x10 Median N 2x10  Radial nerve     Shoulder mobs          K tape L shoulder Y tape    Tape L shoulder Y tape Tape L shoulder Y tape  Scapular resetting MR    SL abd 30x    TherEx         SNAGs    Scap squeezes  20 Scap squeezes 20  Scap squeezes 20x  Scap squeezes  20x   Cat cow    Bilat ER in sitting  20      Open books    S/L rotation open books  10x  5s S/L rotation open books 10x 5s  Standing rotation open books 10x 5s  Standing rotations open books 10x 5s   Thoracic ext          Thread the needle          Doorway stretch          Wall slides + lift off         Rows      GTB 2x10 12.5# 2x10   Shoulder ext      GTB 2x10 12.5# 2x10   Shoulder isos         Shoulder hang         Traction   Traction: 20 min w/ setup (10 deg angle of pull)  - max = 20#  - min = 10#                Neuro Re-Ed     2x10     Chin tucks          Scap retract      Prone rows 2x10   Scap depress          Median N. tensioners         SL shoulder abd          SL shoulder horizontal abd          Is Ts Ys      T's and Ys 2x10             TherAct Assessment   Pt Edu; centralization and neural mobility x 10 min Pt edu: brachial plexus and nerve pain x 10 min     Patient education                                             Gait Training                                    Modalities                      Precautions: h/o seizures (controlled by medication)  Past Medical History:   Diagnosis Date    Cervical radiculopathy     RESOLVED: [id number] buldging disc    Hyperlipidemia     Kidney stone     Migraines     Seizure (HCC)     age 24         Precautions: h/o seizures (controlled by medication)  Past Medical History:   Diagnosis Date    Cervical radiculopathy     RESOLVED: [id number] buldging disc    Hyperlipidemia     Kidney stone     Migraines     Seizure (HCC)     age 24

## 2024-07-10 DIAGNOSIS — R76.8 POSITIVE ANA (ANTINUCLEAR ANTIBODY): Primary | ICD-10-CM

## 2024-07-12 ENCOUNTER — OFFICE VISIT (OUTPATIENT)
Dept: PHYSICAL THERAPY | Facility: CLINIC | Age: 49
End: 2024-07-12
Payer: COMMERCIAL

## 2024-07-12 DIAGNOSIS — M54.12 CERVICAL RADICULOPATHY: Primary | ICD-10-CM

## 2024-07-12 DIAGNOSIS — M54.2 CERVICAL PAIN (NECK): ICD-10-CM

## 2024-07-12 PROCEDURE — 97140 MANUAL THERAPY 1/> REGIONS: CPT

## 2024-07-12 NOTE — PROGRESS NOTES
Daily Note     Today's date: 2024  Patient name: Karly Howell  : 1975  MRN: [id number]  Referring provider: Lombardi, Frank, DO  Dx:   Encounter Diagnosis     ICD-10-CM    1. Cervical radiculopathy  M54.12       2. Cervical pain (neck)  M54.2                      Subjective: Patient reports neck and shoulder pain. She states the night after last session was not too bad, but the pain got worse the next day. She reports trouble looking down into left armpit. She has f/u with Dr. Tompkins 7/15.       Objective: See treatment diary below      Assessment: Tolerated treatment fair. Noted hypertonicity in right UT/LS. Performed TPR, which she noted she could feel down LUE into armpit. Attempted supine traction with cervical exetsnion and rotation, but patient noted dizziness so stopped. Traction helps relieve pain in neck and left arm. Assessed thoracic spine and performed PA mobs gr 4, noted hypomobility in upper thoracic at CTJ and did prone CTJ mob gr 5, she reported decreased in symptoms after. Educated her on different stretches and rev thoracic mobility. She will take the next week off of PT to assess how she is doing independently. Discussed with patient the importance to not anticipate pain with activity to avoid extra stressors. Patient demonstrated fatigue post treatment and would benefit from continued PT to reduce limitation secondary to pain.      Plan: Continue per plan of care.      Insurance:  AMA/CMS Eval/ Re-eval POC expires FOTO Auth #/ Referral # Total units  Start date  Expiration date Extension  Visit limitation?  PT only or  PT+OT? Co-Insurance   [place]: CMS Eval 24   Auth is REQ    12 PT only none       [id number]     12                                                       Date 24   Visit Number 10 re-ev 11 12 13/24 14/24   15/24 16/24   Manual          Thoracic PA        Perf Gr 3-4    CTJ prone mob gr 5    CTJ    "Manual traction x 5 min    SOR x 5 min   Manual traction x5 min   Manual traction x 5 min    STM  UT and posterior scap x 10 min UT, Levator, paraspinals UT, LS   TPR right UT/LS 5 min    Nerve gliding    Median N 2x10 Median N 2x10  Radial nerve   Chin tuck and cervical extension over table x 3 min    Shoulder mobs           K tape L shoulder Y tape    Tape L shoulder Y tape Tape L shoulder Y tape  Scapular resetting MR    SL abd 30x     TherEx          SNAGs    Scap squeezes  20 Scap squeezes 20  Scap squeezes 20x  Scap squeezes  20x Scap squeeze 20x    Cat cow    Bilat ER in sitting  20       Open books    S/L rotation open books  10x  5s S/L rotation open books 10x 5s  Standing rotation open books 10x 5s  Standing rotations open books 10x 5s Rev    Thoracic ext           Thread the needle           Doorway stretch        30\" x 3    Wall slides + lift off          Rows      GTB 2x10 12.5# 2x10    Shoulder ext      GTB 2x10 12.5# 2x10    Shoulder isos          Shoulder hang          Traction   Traction: 20 min w/ setup (10 deg angle of pull)  - max = 20#  - min = 10#                  Neuro Re-Ed     2x10      Chin tucks           Scap retract      Prone rows 2x10    Scap depress           Median N. tensioners          SL shoulder abd           SL shoulder horizontal abd           Is Ts Ys      T's and Ys 2x10               TherAct Assessment   Pt Edu; centralization and neural mobility x 10 min Pt edu: brachial plexus and nerve pain x 10 min      Patient education                                                  Gait Training                                        Modalities                        Precautions: h/o seizures (controlled by medication)  Past Medical History:   Diagnosis Date    Cervical radiculopathy     RESOLVED: [id number] buldging disc    Hyperlipidemia     Kidney stone     Migraines     Seizure (HCC)     age 24         Precautions: h/o seizures (controlled by medication)  Past Medical History: "   Diagnosis Date    Cervical radiculopathy     RESOLVED: [id number] buldging disc    Hyperlipidemia     Kidney stone     Migraines     Seizure (HCC)     age 24

## 2024-07-15 ENCOUNTER — OFFICE VISIT (OUTPATIENT)
Dept: PAIN MEDICINE | Facility: CLINIC | Age: 49
End: 2024-07-15
Payer: COMMERCIAL

## 2024-07-15 VITALS
WEIGHT: 140 LBS | BODY MASS INDEX: 26.43 KG/M2 | DIASTOLIC BLOOD PRESSURE: 90 MMHG | SYSTOLIC BLOOD PRESSURE: 137 MMHG | HEIGHT: 61 IN | HEART RATE: 88 BPM

## 2024-07-15 DIAGNOSIS — M47.22 CERVICAL SPONDYLOSIS WITH RADICULOPATHY: ICD-10-CM

## 2024-07-15 PROCEDURE — 99214 OFFICE O/P EST MOD 30 MIN: CPT | Performed by: STUDENT IN AN ORGANIZED HEALTH CARE EDUCATION/TRAINING PROGRAM

## 2024-07-15 RX ORDER — MELOXICAM 15 MG/1
15 TABLET ORAL DAILY
Qty: 30 TABLET | Refills: 0 | Status: SHIPPED | OUTPATIENT
Start: 2024-07-15 | End: 2024-08-14

## 2024-07-15 NOTE — PROGRESS NOTES
Pain Medicine Follow-Up Note    Assessment:  1. Cervical spondylosis with radiculopathy      Patient is a pleasant 48-year-old woman who presents as a follow-up visit after last being seen on 6/5/2024 for cervical radiculopathy.  Since that visit patient symptoms are better rating her pain as a 4 out of 10 on numeric rating scale.  The symptoms are worse in the evening and the pain is intermittent.  The quality pain is a dull aching, cramping, shooting, numbing, pins-and-needles like pain primarily in her neck rating down her left arm into her digits.  Patient does report significant benefit from her current pain regimen as she reports 50% improvement in pain and function with Meloxicam.  She denies any side effects.    CT scan reviewed and discussed with patient.  Patient complex secondary to her known stenosis at C5-C6 and C6/C7 concordant with her symptoms.  Patient would like to exhaust physical therapy before moving forward with any injections.  Did discuss cervical epidural steroid injection targeting C7-T1 today with patient that she would like to discuss with her  for moving forward.  For symptomatic relief we will continue Mobic 15 mg daily as needed.  Patient amenable to this plan.  Plan:  C7-T1 ELFEGO if patient wants to move forward  MRI Cervical spine ordered, not yet complete. Patient has failed six weeks of conservative therapy with medications and PT and think it is reasonable to pursue  Continue mobic 15 mg daily prn   No orders of the defined types were placed in this encounter.      New Medications Ordered This Visit   Medications   • meloxicam (MOBIC) 15 mg tablet     Sig: Take 1 tablet (15 mg total) by mouth daily     Dispense:  30 tablet     Refill:  0       My impressions and treatment recommendations were discussed in detail with the patient who verbalized understanding and had no further questions.        Follow-up is planned in four weeks time or sooner as warranted.  Discharge  instructions were provided. I personally saw and examined the patient and I agree with the above discussed plan of care.    History of Present Illness:    Karly Howell is a 48 y.o. female who presents to [facility] for interval re-evaluation of the above stated pain complaints. The patient has a past medical and chronic pain history as outlined in the assessment section. She was last seen on 6/5/2024.    Patient is a pleasant 48-year-old woman who presents as a follow-up visit after last being seen on 6/5/2024 for cervical radiculopathy.  Since that visit patient symptoms are better rating her pain as a 4 out of 10 on numeric rating scale.  The symptoms are worse in the evening and the pain is intermittent.  The quality pain is a dull aching, cramping, shooting, numbing, pins-and-needles like pain primarily in her neck rating down her left arm into her digits.  Patient does report significant benefit from her current pain regimen as she reports 50% improvement in pain and function with Meloxicam.  She denies any side effects.      Other than as stated above, the patient denies any interval changes in medications, medical condition, mental condition, symptoms, or allergies since the last office visit.         Review of Systems:    Review of Systems   Constitutional:  Negative for unexpected weight change.   HENT:  Negative for ear pain.    Eyes:  Negative for visual disturbance.   Respiratory:  Negative for shortness of breath and wheezing.    Gastrointestinal:  Negative for abdominal pain.   Musculoskeletal:  Positive for neck pain and neck stiffness. Negative for back pain.        Decreased ROM, joint and muscle pain   Neurological:  Positive for weakness, numbness and headaches.   Psychiatric/Behavioral:  Positive for sleep disturbance. Negative for decreased concentration.          Past Medical History:   Diagnosis Date   • Cervical radiculopathy     RESOLVED: 04DEC2015 buldging disc   •  Hyperlipidemia    • Kidney stone    • Migraines    • Seizure (HCC)     age 24       Past Surgical History:   Procedure Laterality Date   • ADENOIDECTOMY     • COLONOSCOPY     • EGD AND COLONOSCOPY  2019   • LITHOTRIPSY      RENAL   • OVARIAN CYST REMOVAL     • AZ HYSTEROSCOPY BX ENDOMETRIUM&/POLYPC W/WO D&C N/A 10/28/2019    Procedure: OPERATIVE HYSTEROSCOPY (MYOSURE),POLYPECTOMY,D AND C;  Surgeon: Roel Rothman MD;  Location: [facility];  Service: Gynecology       Family History   Problem Relation Age of Onset   • Multiple sclerosis Mother    • Colon cancer Maternal Grandmother 70   • Rheum arthritis Maternal Grandmother    • Arthritis Family    • Colon cancer Family    • Osteoporosis Family    • Anxiety disorder Father    • Chromosomal disorder Cousin    • No Known Problems Sister    • No Known Problems Daughter    • Skin cancer Maternal Grandfather 65        not sure of exact age.   • No Known Problems Paternal Grandmother    • No Known Problems Paternal Grandfather    • No Known Problems Daughter    • No Known Problems Son    • No Known Problems Maternal Aunt    • No Known Problems Maternal Aunt    • No Known Problems Paternal Aunt        Social History     Occupational History   • Not on file   Tobacco Use   • Smoking status: Former     Current packs/day: 0.00     Average packs/day: 1 pack/day for 20.0 years (20.0 ttl pk-yrs)     Types: Cigarettes     Start date: 6/15/1992     Quit date: 2007     Years since quittin.2     Passive exposure: Never   • Smokeless tobacco: Never   Vaping Use   • Vaping status: Never Used   Substance and Sexual Activity   • Alcohol use: Not Currently     Comment: rare   • Drug use: Not Currently     Types: Marijuana     Comment: gummy   • Sexual activity: Not on file         Current Outpatient Medications:   •  Aimovig 140 MG/ML SOAJ, ADMINISTER 1 ML UNDER THE SKIN EVERY 4 WEEKS, Disp: , Rfl:   •  carBAMazepine (CARBATROL) 300 MG 12 hr capsule, 300 mg every 12  (twelve) hours , Disp: , Rfl: 1  •  meloxicam (MOBIC) 15 mg tablet, Take 1 tablet (15 mg total) by mouth daily, Disp: 30 tablet, Rfl: 0  •  omeprazole (PriLOSEC) 20 mg delayed release capsule, TAKE 1 CAPSULE BY MOUTH EVERY DAY, Disp: 90 capsule, Rfl: 0  •  rosuvastatin (CRESTOR) 20 MG tablet, Take 1 tablet (20 mg total) by mouth daily, Disp: 90 tablet, Rfl: 0  •  SUMAtriptan (IMITREX) 100 mg tablet, Take 1 tablet (100 mg total) by mouth once as needed for migraine for up to 60 doses, Disp: 30 tablet, Rfl: 1  •  valACYclovir (VALTREX) 500 mg tablet, 500 mg daily As needed, Disp: , Rfl: 2  •  benzonatate (TESSALON) 200 MG capsule, Take 1 capsule (200 mg total) by mouth 3 (three) times a day as needed for cough (Patient not taking: Reported on 5/3/2024), Disp: 30 capsule, Rfl: 0  •  Casanthranol-Docusate Sodium (STOOL SOFTENER PLUS PO), Take by mouth Rarely takes as needed, Disp: , Rfl:   •  cyclobenzaprine (FLEXERIL) 10 mg tablet, Take 1 tablet (10 mg total) by mouth daily at bedtime for 21 days (Patient not taking: Reported on 6/28/2024), Disp: 21 tablet, Rfl: 0  •  ibuprofen (MOTRIN) 200 mg tablet, Take 200 mg by mouth every 6 (six) hours as needed for mild pain  (Patient not taking: Reported on 6/27/2024), Disp: , Rfl:   •  LORazepam (ATIVAN) 2 mg tablet, Take 1 tablet (2 mg total) by mouth 1 (one) time for 1 dose TAKE 1 TABLET BY MOUTH 20 MINUTES PRIOR TO SCHEDULED MRI (Patient not taking: Reported on 6/17/2024), Disp: 1 tablet, Rfl: 0  •  methocarbamol (ROBAXIN) 500 mg tablet, Take 1 tablet (500 mg total) by mouth 2 (two) times a day (Patient not taking: Reported on 6/17/2024), Disp: 20 tablet, Rfl: 0  •  polyethylene glycol (GOLYTELY) 4000 mL solution, Take 4,000 mL by mouth once for 1 dose (Patient not taking: Reported on 6/28/2024), Disp: 4000 mL, Rfl: 0  •  predniSONE 10 mg tablet, , Disp: , Rfl:   •  predniSONE 20 mg tablet, 4 tabs for three days, 3 tabs for three days, 2 tabs for three days, 1 tab for three  "days, 1/2 tab for 4 days (Patient not taking: Reported on 5/31/2024), Disp: 32 tablet, Rfl: 0  •  tiZANidine (ZANAFLEX) 4 mg tablet, Take 1 tablet (4 mg total) by mouth 2 (two) times a day as needed for muscle spasms (Patient not taking: Reported on 6/17/2024), Disp: 60 tablet, Rfl: 0  •  triamcinolone (KENALOG) 0.025 % cream, Apply topically 2 (two) times a day (Patient not taking: Reported on 6/28/2024), Disp: 15 g, Rfl: 1    Allergies   Allergen Reactions   • Ciprofloxacin Lightheadedness     dizzy   • Phenytoin Rash     Reaction Date: 05Oct2010;        Physical Exam:    /90   Pulse 88   Ht 5' 1\" (1.549 m)   Wt 63.5 kg (140 lb)   BMI 26.45 kg/m²     Constitutional:normal, well developed, well nourished, alert, in no distress and non-toxic and no overt pain behavior.  Eyes:anicteric  HEENT:grossly intact  Neck:supple, symmetric, trachea midline and no masses   Pulmonary:even and unlabored  Cardiovascular:No edema or pitting edema present  Skin:Normal without rashes or lesions and well hydrated  Psychiatric:Mood and affect appropriate  Neurologic:Cranial Nerves II-XII grossly intact  Musculoskeletal:normal gait. Pain with rotation of the cervical spine and paresthesias.       Imaging  CT CERVICAL SPINE - WITHOUT CONTRAST     INDICATION:   pain in neck radiating down left shoulder.     COMPARISON:  None.     TECHNIQUE:  CT examination of the cervical spine was performed without intravenous contrast.  Contiguous axial images were obtained. Multiplanar 2D reformatted images were created from the source data.     Radiation dose length product (DLP) for this visit:  508 mGy-cm .  This examination, like all CT scans performed in the [facility], was performed utilizing techniques to minimize radiation dose exposure, including the use of iterative   reconstruction and automated exposure control.     IMAGE QUALITY:  Diagnostic.     FINDINGS:     ALIGNMENT:  There is straightening of normal " cervical lordosis.  No subluxation or compression deformity.     VERTEBRAE:  No fracture.     DEGENERATIVE CHANGES:     At C5-C6, there is moderate to space narrowing. Central disc osteophyte complex with uncinate hypertrophy resulting in minimal canal stenosis with mild bilateral foraminal stenosis.     At C6-C7, there is moderate to space narrowing. Broad-based disc osteophyte complex and uncinate hypertrophy resulting in mild canal and mild bilateral foraminal stenosis     PREVERTEBRAL AND PARASPINAL SOFT TISSUES: Unremarkable     THORACIC INLET:  Normal.     IMPRESSION:     No cervical spine fracture or traumatic malalignment.     Degenerative changes, as described. This can be better detailed by MRI if clinically warranted.     No orders to display         No orders of the defined types were placed in this encounter.

## 2024-07-25 ENCOUNTER — OFFICE VISIT (OUTPATIENT)
Dept: PHYSICAL THERAPY | Facility: CLINIC | Age: 49
End: 2024-07-25
Payer: COMMERCIAL

## 2024-07-25 DIAGNOSIS — M54.12 CERVICAL RADICULOPATHY: Primary | ICD-10-CM

## 2024-07-25 DIAGNOSIS — M54.2 CERVICAL PAIN (NECK): ICD-10-CM

## 2024-07-25 PROCEDURE — 97110 THERAPEUTIC EXERCISES: CPT

## 2024-07-25 PROCEDURE — 97140 MANUAL THERAPY 1/> REGIONS: CPT

## 2024-07-25 NOTE — PROGRESS NOTES
Daily Note     Today's date: 2024  Patient name: Karly Howell  : 1975  MRN: [id number]  Referring provider: Lombardi, Frank, DO  Dx:   Encounter Diagnosis     ICD-10-CM    1. Cervical radiculopathy  M54.12       2. Cervical pain (neck)  M54.2                      Subjective: Patient had f/u with Dr. Tompkins on 7/15 where they discussed possible injections in neck. At this time, she is still on the fence on whether she wants to move forward with the injections. She does report overall improvement with PT. She reports she can now sleep on her left side occasionally and has less instances of needing to lift her arm OH.      Objective: See treatment diary below      Assessment: Tolerated treatment well. Hypertrophic tissue noted bilateral UT/LS ; responded well with TPR. Emphasis on thoracic and scapular mobility which she tolerated well. Discussed dry needling and patient was educated on the application and overall process. She requests to initiate dry needling next week as well as continue with current POC. Patient demonstrated fatigue post treatment, exhibited good technique with therapeutic exercises, and would benefit from continued PT to reduce limitation secondary to pain.      Plan:  Dry Needling next visit      Insurance:  AMA/CMS Eval/ Re-eval POC expires FOTO Auth #/ Referral # Total units  Start date  Expiration date Extension  Visit limitation?  PT only or  PT+OT? Co-Insurance   BC [place]: CMS Eval 24   Auth is REQ    12 PT only none       [id number]     12                                                       Date 24   Visit Number 10 re-ev 11 12 13/24 14/24   15/24 16/24 17/24   Manual           Thoracic PA        Perf Gr 3-4    CTJ prone mob gr 5     CTJ   Manual traction x 5 min    SOR x 5 min   Manual traction x5 min   Manual traction x 5 min     STM  UT and posterior scap x 10 min UT, Levator, paraspinals UT, LS    "TPR right UT/LS 5 min  TPR L/R UT/LS 8 min    Nerve gliding    Median N 2x10 Median N 2x10  Radial nerve   Chin tuck and cervical extension over table x 3 min     Shoulder mobs            K tape L shoulder Y tape    Tape L shoulder Y tape Tape L shoulder Y tape  Scapular resetting MR    SL abd 30x      TherEx           SNAGs    Scap squeezes  20 Scap squeezes 20  Scap squeezes 20x  Scap squeezes  20x Scap squeeze 20x  Scap squeeze 20x    Cat cow    Bilat ER in sitting  20     30x    Open books    S/L rotation open books  10x  5s S/L rotation open books 10x 5s  Standing rotation open books 10x 5s  Standing rotations open books 10x 5s Rev  Sidelying open books 10 x 5s ea    Thoracic ext            Thread the needle            Doorway stretch        30\" x 3     Wall slides + lift off           Rows      GTB 2x10 12.5# 2x10  Scap depression 10\"x20    Shoulder ext      GTB 2x10 12.5# 2x10     Shoulder isos           Shoulder hang           Traction   Traction: 20 min w/ setup (10 deg angle of pull)  - max = 20#  - min = 10#                    Neuro Re-Ed     2x10       Chin tucks            Scap retract      Prone rows 2x10     Scap depress            Median N. tensioners           SL shoulder abd            SL shoulder horizontal abd            Is Ts Ys      T's and Ys 2x10                 TherAct Assessment   Pt Edu; centralization and neural mobility x 10 min Pt edu: brachial plexus and nerve pain x 10 min       Patient education        Dry Needling 8min                                                Gait Training                                            Modalities                          Precautions: h/o seizures (controlled by medication)  Past Medical History:   Diagnosis Date    Cervical radiculopathy     RESOLVED: [id number] buldging disc    Hyperlipidemia     Kidney stone     Migraines     Seizure (HCC)     age 24         Precautions: h/o seizures (controlled by medication)  Past Medical History:   Diagnosis " Date    Cervical radiculopathy     RESOLVED: 04DEC2015 buldging disc    Hyperlipidemia     Kidney stone     Migraines     Seizure (HCC)     age 24

## 2024-07-26 ENCOUNTER — HOSPITAL ENCOUNTER (OUTPATIENT)
Dept: RADIOLOGY | Facility: HOSPITAL | Age: 49
Discharge: HOME | End: 2024-07-26
Attending: ORTHOPAEDIC SURGERY
Payer: COMMERCIAL

## 2024-07-26 DIAGNOSIS — M54.12 CERVICAL RADICULOPATHY: ICD-10-CM

## 2024-07-26 DIAGNOSIS — M54.2 MYOFASCIAL NECK PAIN: ICD-10-CM

## 2024-07-26 PROCEDURE — 72141 MRI NECK SPINE W/O DYE: CPT

## 2024-07-29 ENCOUNTER — OFFICE VISIT (OUTPATIENT)
Dept: FAMILY MEDICINE CLINIC | Facility: CLINIC | Age: 49
End: 2024-07-29
Payer: COMMERCIAL

## 2024-07-29 VITALS
SYSTOLIC BLOOD PRESSURE: 136 MMHG | DIASTOLIC BLOOD PRESSURE: 92 MMHG | BODY MASS INDEX: 27.09 KG/M2 | TEMPERATURE: 97.9 F | HEIGHT: 61 IN | HEART RATE: 83 BPM | WEIGHT: 143.5 LBS | RESPIRATION RATE: 16 BRPM

## 2024-07-29 DIAGNOSIS — R21 RASH: ICD-10-CM

## 2024-07-29 DIAGNOSIS — Z00.00 WELL ADULT EXAM: Primary | ICD-10-CM

## 2024-07-29 DIAGNOSIS — R76.8 POSITIVE ANA (ANTINUCLEAR ANTIBODY): ICD-10-CM

## 2024-07-29 DIAGNOSIS — E78.2 HYPERLIPEMIA, MIXED: ICD-10-CM

## 2024-07-29 PROCEDURE — 99396 PREV VISIT EST AGE 40-64: CPT | Performed by: FAMILY MEDICINE

## 2024-07-29 PROCEDURE — 3725F SCREEN DEPRESSION PERFORMED: CPT | Performed by: FAMILY MEDICINE

## 2024-07-29 NOTE — PROGRESS NOTES
[facility] HEALTH MAINTENANCE OFFICE VISIT  [facility]    NAME: Karly Howell  AGE: 48 y.o. SEX: female  : 1975     DATE: 2024    Assessment and Plan     1. Well adult exam  2. Hyperlipemia, mixed  Assessment & Plan:  Pt would like to stop this for the month of August and then restart   Orders:  -     Comprehensive metabolic panel; Future; Expected date: 01/10/2025  -     Lipid Panel with Direct LDL reflex; Future; Expected date: 01/10/2025  -     Comprehensive metabolic panel  -     Lipid Panel with Direct LDL reflex  3. Rash  -     Celiac Disease Panel; Future  4. Positive FEMI (antinuclear antibody)  -     Celiac Disease Panel; Future      Patient Counseling:   Nutrition: Stressed importance of a well balanced diet, moderation of sodium/saturated fat, caloric balance and sufficient intake of fiber  Exercise: Stressed the importance of regular exercise with a goal of 150 minutes per week  Dental Health: Discussed daily flossing and brushing and regular dental visits     Immunizations reviewed: Up To Date  Discussed benefits of:  Colon Cancer Screening, Mammogram , Cervical Cancer screening, and Screening labs.  BMI Counseling: Body mass index is 26.89 kg/m². Discussed with patient's BMI with her. The BMI is above normal. Nutrition recommendations include reducing portion sizes.    No follow-ups on file.        Chief Complaint     Chief Complaint   Patient presents with   • Annual Exam     Lw cma       History of Present Illness     Pt is sched for a full physical  Pt states she had a rough summer - had a bunch of bouts with rashes.    Pt states her lipids did go down - Pt states she keeps getting rashes - pt was sent for tests and states her FEMI was positive. Pt also sent by derm.  Pt is aksing if she can stop her lipids med for the month fo August    Colon and endo will be done in September    No rash today        Well Adult Physical   Patient here for a  comprehensive physical exam.      Diet and Physical Activity  Diet: well balanced diet  Exercise: intermittently      Depression Screen  PHQ-2/9 Depression Screening    Little interest or pleasure in doing things: 0 - not at all  Feeling down, depressed, or hopeless: 0 - not at all  PHQ-2 Score: 0  PHQ-2 Interpretation: Negative depression screen          General Health  Hearing: Normal:  bilateral  Vision: wears glasses  Dental: regular dental visits    Reproductive Health  No issues       The following portions of the patient's history were reviewed and updated as appropriate: allergies, current medications, past family history, past medical history, past social history, past surgical history and problem list.    Review of Systems     Review of Systems   Constitutional: Negative.  Negative for activity change, appetite change, chills, diaphoresis and fatigue.   HENT: Negative.  Negative for dental problem, ear pain, sinus pressure and sore throat.    Eyes: Negative.  Negative for photophobia, pain, discharge, redness, itching and visual disturbance.   Respiratory:  Negative for apnea and chest tightness.    Cardiovascular: Negative.  Negative for chest pain, palpitations and leg swelling.   Gastrointestinal: Negative.  Negative for abdominal distention, abdominal pain, constipation and diarrhea.   Endocrine: Negative.  Negative for cold intolerance and heat intolerance.   Genitourinary: Negative.  Negative for difficulty urinating and dyspareunia.   Musculoskeletal: Negative.  Negative for arthralgias and back pain.   Skin: Negative.    Allergic/Immunologic: Negative for environmental allergies.   Neurological: Negative.  Negative for dizziness.   Psychiatric/Behavioral: Negative.  Negative for agitation.        Past Medical History     Past Medical History:   Diagnosis Date   • Cervical radiculopathy     RESOLVED: [id number] buldging disc   • Hyperlipidemia    • Kidney stone    • Migraines    • Seizure (HCC)      age 24       Past Surgical History     Past Surgical History:   Procedure Laterality Date   • ADENOIDECTOMY     • COLONOSCOPY     • EGD AND COLONOSCOPY  2019   • LITHOTRIPSY  2006    RENAL   • OVARIAN CYST REMOVAL     • AZ HYSTEROSCOPY BX ENDOMETRIUM&/POLYPC W/WO D&C N/A 10/28/2019    Procedure: OPERATIVE HYSTEROSCOPY (MYOSURE),POLYPECTOMY,D AND C;  Surgeon: Roel Rothman MD;  Location: [facility];  Service: Gynecology       Social History     Social History     Socioeconomic History   • Marital status: /Civil Union     Spouse name: None   • Number of children: None   • Years of education: None   • Highest education level: None   Occupational History   • None   Tobacco Use   • Smoking status: Former     Current packs/day: 0.00     Average packs/day: 1 pack/day for 20.0 years (20.0 ttl pk-yrs)     Types: Cigarettes     Start date: 6/15/1992     Quit date: 2007     Years since quittin.2     Passive exposure: Never   • Smokeless tobacco: Never   Vaping Use   • Vaping status: Never Used   Substance and Sexual Activity   • Alcohol use: Not Currently     Comment: rare   • Drug use: Not Currently     Types: Marijuana     Comment: gummy   • Sexual activity: None   Other Topics Concern   • None   Social History Narrative    Daily coffee consumption    Daily tea consumption     as per all scripts    Lack of exercise    Sleeps 6-7 hours a day      Social Determinants of Health     Financial Resource Strain: Not on file   Food Insecurity: Not on file   Transportation Needs: Not on file   Physical Activity: Not on file   Stress: Not on file   Social Connections: Not on file   Intimate Partner Violence: Not on file   Housing Stability: Not on file       Family History     Family History   Problem Relation Age of Onset   • Multiple sclerosis Mother    • Colon cancer Maternal Grandmother 70   • Rheum arthritis Maternal Grandmother    • Arthritis Family    • Colon cancer Family    • Osteoporosis  "Family    • Anxiety disorder Father    • Chromosomal disorder Cousin    • No Known Problems Sister    • No Known Problems Daughter    • Skin cancer Maternal Grandfather 65        not sure of exact age.   • No Known Problems Paternal Grandmother    • No Known Problems Paternal Grandfather    • No Known Problems Daughter    • No Known Problems Son    • No Known Problems Maternal Aunt    • No Known Problems Maternal Aunt    • No Known Problems Paternal Aunt        Current Medications       Current Outpatient Medications:   •  Aimovig 140 MG/ML SOAJ, ADMINISTER 1 ML UNDER THE SKIN EVERY 4 WEEKS, Disp: , Rfl:   •  carBAMazepine (CARBATROL) 300 MG 12 hr capsule, 300 mg every 12 (twelve) hours , Disp: , Rfl: 1  •  Casanthranol-Docusate Sodium (STOOL SOFTENER PLUS PO), Take by mouth Rarely takes as needed, Disp: , Rfl:   •  meloxicam (MOBIC) 15 mg tablet, Take 1 tablet (15 mg total) by mouth daily, Disp: 30 tablet, Rfl: 0  •  rosuvastatin (CRESTOR) 20 MG tablet, Take 1 tablet (20 mg total) by mouth daily, Disp: 90 tablet, Rfl: 0  •  SUMAtriptan (IMITREX) 100 mg tablet, Take 1 tablet (100 mg total) by mouth once as needed for migraine for up to 60 doses, Disp: 30 tablet, Rfl: 1  •  valACYclovir (VALTREX) 500 mg tablet, 500 mg daily As needed, Disp: , Rfl: 2     Allergies     Allergies   Allergen Reactions   • Ciprofloxacin Lightheadedness     dizzy   • Phenytoin Rash     Reaction Date: 05Oct2010;        Objective     /92   Pulse 83   Temp 97.9 °F (36.6 °C) (Temporal)   Resp 16   Ht 5' 1.25\" (1.556 m) Comment: with shoes  Wt 65.1 kg (143 lb 8 oz)   BMI 26.89 kg/m²      Physical Exam  Vitals and nursing note reviewed.   Constitutional:       General: She is not in acute distress.     Appearance: She is well-developed. She is not diaphoretic.   HENT:      Head: Normocephalic and atraumatic.      Right Ear: External ear normal.      Left Ear: External ear normal.      Nose: Nose normal.      Mouth/Throat:      " Pharynx: No oropharyngeal exudate.   Eyes:      General: No scleral icterus.        Right eye: No discharge.         Left eye: No discharge.      Pupils: Pupils are equal, round, and reactive to light.   Neck:      Thyroid: No thyromegaly.   Cardiovascular:      Rate and Rhythm: Normal rate.      Heart sounds: Normal heart sounds. No murmur heard.  Pulmonary:      Effort: Pulmonary effort is normal. No respiratory distress.      Breath sounds: Normal breath sounds. No wheezing.   Abdominal:      General: Bowel sounds are normal. There is no distension.      Palpations: Abdomen is soft. There is no mass.      Tenderness: There is no abdominal tenderness. There is no guarding or rebound.   Musculoskeletal:         General: Normal range of motion.   Skin:     General: Skin is warm and dry.      Findings: No erythema or rash.   Neurological:      Mental Status: She is alert.      Coordination: Coordination normal.      Deep Tendon Reflexes: Reflexes normal.   Psychiatric:         Behavior: Behavior normal.           Vision Screening    Right eye Left eye Both eyes   Without correction      With correction 20/20 20/20 20/20           Frank Lombardi, DO [facility]

## 2024-07-30 ENCOUNTER — APPOINTMENT (OUTPATIENT)
Dept: PHYSICAL THERAPY | Facility: CLINIC | Age: 49
End: 2024-07-30
Payer: COMMERCIAL

## 2024-07-30 ENCOUNTER — TELEPHONE (OUTPATIENT)
Dept: ADMINISTRATIVE | Facility: OTHER | Age: 49
End: 2024-07-30

## 2024-07-30 NOTE — LETTER
Procedure Request Form: Mammogram      Date Requested: 24  Patient: Karly Howell  Patient : 1975   Referring Provider: Frank Lombardi, DO        Date of Procedure ______________________________       The above patient has informed us that they have completed their   most recent Mammogram at your facility. Please complete   this form and attach all corresponding procedure reports/results.    Comments __________________________________________________________  ____________________________________________________________________  ____________________________________________________________________  ____________________________________________________________________    Facility Completing Procedure _________________________________________    Form Completed By (print name) _______________________________________      Signature __________________________________________________________      These reports are needed for  compliance.    Please fax this completed form and a copy of the procedure report to our office located at [address] [place] [address] as soon as possible to Fax [phone] darlene Ferreira: Phone [phone]    We thank you for your assistance in treating our mutual patient.   [facility] - [phone] F [phone]    
English

## 2024-07-30 NOTE — TELEPHONE ENCOUNTER
----- Message from Frank Lombardi, DO sent at 7/29/2024  2:32 PM EDT -----  Regarding: mammo  07/29/24 2:32 PM    Hello, our patient Karly Howell has had Mammogram completed/performed. Please assist in updating the patient chart by making an External outreach to [facility] located in [place], [place]. The date of service is recent.    Thank you,  Frank Lombardi, DO  [facility]

## 2024-07-30 NOTE — TELEPHONE ENCOUNTER
Upon review of the In Basket request and the patient's chart, initial outreach has been made via fax to facility. Please see Contacts section for details.     Thank you  Hanna Whitt

## 2024-07-31 ENCOUNTER — TELEPHONE (OUTPATIENT)
Dept: OBGYN CLINIC | Facility: CLINIC | Age: 49
End: 2024-07-31

## 2024-07-31 ENCOUNTER — OFFICE VISIT (OUTPATIENT)
Dept: PHYSICAL THERAPY | Facility: CLINIC | Age: 49
End: 2024-07-31
Payer: COMMERCIAL

## 2024-07-31 ENCOUNTER — TELEPHONE (OUTPATIENT)
Dept: PAIN MEDICINE | Facility: CLINIC | Age: 49
End: 2024-07-31

## 2024-07-31 DIAGNOSIS — M54.2 CERVICAL PAIN (NECK): ICD-10-CM

## 2024-07-31 DIAGNOSIS — M54.12 CERVICAL RADICULOPATHY: Primary | ICD-10-CM

## 2024-07-31 PROCEDURE — 97140 MANUAL THERAPY 1/> REGIONS: CPT

## 2024-07-31 PROCEDURE — 97530 THERAPEUTIC ACTIVITIES: CPT

## 2024-07-31 NOTE — TELEPHONE ENCOUNTER
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  Since these nerves are getting squeezed it is causing her to have pain radiating down her arm we can offer her a cervical epidural steroid injection the highest level we typically go is C7-T1 research states that the medication typically goes up about 3 levels.  The medication reduces any inflammation and can calm the nerves down.  This would hopefully allow you to focus on conservative measures that may help create space in your neck like cervical traction.    I am not sure if Dr. Tompkins has spoken to the patient about cervical epidural steroid injections.  If she needs to know more information let us know.  Otherwise okay to schedule for C7-T1 cervical epidural steroid injection.    Clinical if you can review with patient, send to  if interested in injection.

## 2024-07-31 NOTE — TELEPHONE ENCOUNTER
Sw pt and scheduled fu with Mikel Tompkins   ----- Message from Stephen Tompkins MD sent at 7/31/2024  7:20 AM EDT -----  Please let the patient know the MRI shows some possible sources of pain. I would like her to see spine and pain to review and discuss additional options.  ----- Message -----  From: Interface, Radiology Results In  Sent: 7/31/2024   7:13 AM EDT  To: Stephen Tompkins MD

## 2024-07-31 NOTE — PROGRESS NOTES
Daily Note     Today's date: 2024  Patient name: Karly Howell  : 1975  MRN: [id number]  Referring provider: Lombardi, Frank, DO  Dx:   Encounter Diagnosis     ICD-10-CM    1. Cervical radiculopathy  M54.12       2. Cervical pain (neck)  M54.2             Start Time: 1630  Stop Time: 1715  Total time in clinic (min): 45 minutes    Subjective: Patient had her MRI results read to her and decided to hold off on the injection. She feels like she is getting better each visit.       Objective: See treatment diary below      Assessment: Tolerated treatment well. Dry needling consent for reviewed and signed by patient. Pt educated on dry needling to include but not limited to: risks/benefits/aftercare instructions. Provided with educational handout on DN. All questions and concerns answered and addressed.  Pt verbally consented to dry needling treatment session. Risks/benefits/aftercare instructions reviewed. All questions/concerns addressed.  Pt in prone position. Hand hygiene performed pre and post DN treatment session. Placement of needles in pathological tissue.   Ears, cervical spine, lumbar spine (needle location).  Total treatment duration to include set up/break down/in situ: 30 minutes. Patient was supervised by clinician throughout entirety of treatment session to include when DN in situ; clinician next to patient. All needles counted upon insertion and removal-- 30 needles. All accounted for and disposed in appropriate sharp container.     No adverse reaction to treatment. Pt advised may experience bruising, soreness, fatigue. Pt verbalized understanding.      Patient demonstrated fatigue post treatment, exhibited good technique with therapeutic exercises, and would benefit from continued PT to reduce limitation secondary to pain.      Plan:  Dry Needling next visit      Insurance:  AMA/CMS Eval/ Re-eval POC expires FOTO Auth #/ Referral # Total units  Start date  Expiration date Extension  Visit  "limitation?  PT only or  PT+OT? Co-Insurance   BC [place]: CMS Eval 5/21/24   Auth is REQ  5/21 8/21 12 PT only none       [id number]   6/28 9/28 12                                                       Date 7/5/24 7/9/24 7/12/24 7/25/24 7/31/24   Visit Number 14/24   15/24 16/24 17/24 18/24   Manual        Thoracic PA    Perf Gr 3-4    CTJ prone mob gr 5      CTJ  Manual traction x5 min   Manual traction x 5 min      STM   TPR right UT/LS 5 min  TPR L/R UT/LS 8 min     Nerve gliding  Radial nerve   Chin tuck and cervical extension over table x 3 min      Shoulder mobs     DN with and without estim x30'     Tape L shoulder Y tape  Scapular resetting MR    SL abd 30x       TherEx        SNAGs  Scap squeezes 20x  Scap squeezes  20x Scap squeeze 20x  Scap squeeze 20x     Cat cow     30x     Open books  Standing rotation open books 10x 5s  Standing rotations open books 10x 5s Rev  Sidelying open books 10 x 5s ea     Thoracic ext         Thread the needle         Doorway stretch    30\" x 3      Wall slides + lift off        Rows  GTB 2x10 12.5# 2x10  Scap depression 10\"x20     Shoulder ext  GTB 2x10 12.5# 2x10      Shoulder isos        Shoulder hang        Traction                 Neuro Re-Ed 2x10        Chin tucks         Scap retract  Prone rows 2x10      Scap depress         Median N. tensioners        SL shoulder abd         SL shoulder horizontal abd         Is Ts Ys  T's and Ys 2x10               TherAct Pt edu: brachial plexus and nerve pain x 10 min        Patient education    Dry Needling 8min  DN x10'                                    Gait Training                                Modalities                    Precautions: h/o seizures (controlled by medication)  Past Medical History:   Diagnosis Date    Cervical radiculopathy     RESOLVED: [id number] buldging disc    Hyperlipidemia     Kidney stone     Migraines     Seizure (HCC)     age 24         Precautions: h/o seizures (controlled by medication)  Past Medical " History:   Diagnosis Date    Cervical radiculopathy     RESOLVED: [id number] buldging disc    Hyperlipidemia     Kidney stone     Migraines     Seizure (HCC)     age 24

## 2024-07-31 NOTE — TELEPHONE ENCOUNTER
"S/w pt and advised of same. Pt verbalized understanding. Per pt \" I am doing much better now than I was 7 weeks ago so I would like to hold off on the cervical epidural at this time. I am going on vacation next week. I would like to keep my appt in September with Dr Tompkins for now.\" Pt advised to call our office if she changes her mind and would like to schedule a ELFEGO.   "

## 2024-08-02 ENCOUNTER — OFFICE VISIT (OUTPATIENT)
Dept: PHYSICAL THERAPY | Facility: CLINIC | Age: 49
End: 2024-08-02
Payer: COMMERCIAL

## 2024-08-02 DIAGNOSIS — M54.2 CERVICAL PAIN (NECK): ICD-10-CM

## 2024-08-02 DIAGNOSIS — M54.12 CERVICAL RADICULOPATHY: Primary | ICD-10-CM

## 2024-08-02 PROCEDURE — 97530 THERAPEUTIC ACTIVITIES: CPT

## 2024-08-02 PROCEDURE — 97110 THERAPEUTIC EXERCISES: CPT

## 2024-08-02 PROCEDURE — 97112 NEUROMUSCULAR REEDUCATION: CPT

## 2024-08-02 NOTE — PROGRESS NOTES
Daily Note     Today's date: 2024  Patient name: Karly Howell  : 1975  MRN: [id number]  Referring provider: Lombardi, Frank, DO  Dx:   Encounter Diagnosis     ICD-10-CM    1. Cervical radiculopathy  M54.12       2. Cervical pain (neck)  M54.2               Start Time: 1100  Stop Time: 1145  Total time in clinic (min): 45 minutes    Subjective: Patient reports feeling better compared to previous sessions. Had her MRI read and is concerned over the results.       Objective: See treatment diary below      Assessment: Tolerated treatment well. Discussed the MRI findings w/ the patient. Performed force progression with her which were all yellow lights. Consider performing retraction ext off table NV to see if her symptoms change. She may continue to benefit from thoracic/cervical mobility and motor control to continue reducing her symptoms. Continue progressing pt as she can tolerate regarding her symptom management.  Patient demonstrated fatigue post treatment, exhibited good technique with therapeutic exercises, and would benefit from continued PT      Plan:  Dry Needling next visit      Insurance:  AMA/CMS Eval/ Re-eval POC expires FOTO Auth #/ Referral # Total units  Start date  Expiration date Extension  Visit limitation?  PT only or  PT+OT? Co-Insurance   BC [place]: CMS Eval 24   Auth is REQ    12 PT only none       [id number]     12                                                       Date 24   Visit Number 14/24   15/24 16/24 17/24 18/24 19/24   Manual         Thoracic PA    Perf Gr 3-4    CTJ prone mob gr 5       CTJ  Manual traction x5 min   Manual traction x 5 min       STM   TPR right UT/LS 5 min  TPR L/R UT/LS 8 min      Nerve gliding  Radial nerve   Chin tuck and cervical extension over table x 3 min       Shoulder mobs     DN with and without estim x30'      Tape L shoulder Y tape  Scapular resetting MR    SL abd 30x        TherEx      "    SNAGs  Scap squeezes 20x  Scap squeezes  20x Scap squeeze 20x  Scap squeeze 20x      Cat cow     30x      Open books  Standing rotation open books 10x 5s  Standing rotations open books 10x 5s Rev  Sidelying open books 10 x 5s ea      Thoracic ext          Thread the needle          Doorway stretch    30\" x 3       Wall slides + lift off         Rows  GTB 2x10 12.5# 2x10  Scap depression 10\"x20      Shoulder ext  GTB 2x10 12.5# 2x10       Shoulder isos         Shoulder hang         Traction          Mechanical exam      Retraction 3x10  Retraction ext 4x10 Yellow   Neuro Re-Ed 2x10         Chin tucks          Scap retract  Prone rows 2x10       Scap depress          Median N. tensioners         SL shoulder abd          SL shoulder horizontal abd          Is Ts Ys  T's and Ys 2x10        Radial nerve tensioner      2x15   TherAct Pt edu: brachial plexus and nerve pain x 10 min         Patient education    Dry Needling 8min  DN x10'  Reviewed imaging                                       Gait Training                                    Modalities                      Precautions: h/o seizures (controlled by medication)  Past Medical History:   Diagnosis Date    Cervical radiculopathy     RESOLVED: [id number] buldging disc    Hyperlipidemia     Kidney stone     Migraines     Seizure (HCC)     age 24         Precautions: h/o seizures (controlled by medication)  Past Medical History:   Diagnosis Date    Cervical radiculopathy     RESOLVED: [id number] buldging disc    Hyperlipidemia     Kidney stone     Migraines     Seizure (HCC)     age 24                        "

## 2024-08-05 ENCOUNTER — OFFICE VISIT (OUTPATIENT)
Dept: PHYSICAL THERAPY | Facility: CLINIC | Age: 49
End: 2024-08-05
Payer: COMMERCIAL

## 2024-08-05 DIAGNOSIS — M54.2 CERVICAL PAIN (NECK): ICD-10-CM

## 2024-08-05 DIAGNOSIS — M54.12 CERVICAL RADICULOPATHY: Primary | ICD-10-CM

## 2024-08-05 PROCEDURE — 97140 MANUAL THERAPY 1/> REGIONS: CPT | Performed by: PHYSICAL THERAPIST

## 2024-08-05 NOTE — PROGRESS NOTES
Daily Note     Today's date: 2024  Patient name: Karly Howell  : 1975  MRN: [id number]  Referring provider: Lombardi, Frank, DO  Dx:   Encounter Diagnosis     ICD-10-CM    1. Cervical radiculopathy  M54.12       2. Cervical pain (neck)  M54.2                          Subjective: Patient reports that she overall is doing better. Reports that her arm is doing better but still feels it in her left shoulder. Scapular discomfort has improved.     Objective: See treatment diary below      Assessment: Tolerated treatment well. Dry needling consent for reviewed and signed by patient. Pt educated on dry needling to include but not limited to: risks/benefits/aftercare instructions. Provided with educational handout on DN. All questions and concerns answered and addressed.  Pt verbally consented to dry needling treatment session. Risks/benefits/aftercare instructions reviewed. All questions/concerns addressed.  Pt in massage chair position. Hand hygiene performed pre and post DN treatment session. Placement of needles in pathological tissue.   Ears, cervical spine, suboccipital and CT-junction. No E-stim.  Total treatment duration to include set up/break down/in situ: 30 minutes. Patient was supervised by clinician throughout entirety of treatment session to include when DN in situ; clinician next to patient. All needles counted upon insertion and removal-- 25 needles. All accounted for and disposed in appropriate sharp container.      No adverse reaction to treatment. Pt advised may experience bruising, soreness, fatigue. Pt verbalized understanding.     Assessment: Tolerated treatment well. Patient reported slight headache starting post session.     Plan: Continue per plan of care.     Insurance:  AMA/CMS Eval/ Re-eval POC expires FOTO Auth #/ Referral # Total units  Start date  Expiration date Extension  Visit limitation?  PT only or  PT+OT? Co-Insurance   BC [place]: CMS Eval 24   Auth is REQ   PT  "only none       [id number]   6/28 9/28  12                     Date 7/12/24 7/25/24 7/31/24 8/2/24 8/5/24   Visit Number 16/24 17/24 18/24 19/24 20/24   Manual        Thoracic PA  Perf Gr 3-4  CTJ prone mob gr 5        CTJ  Manual traction x 5 min        STM TPR right UT/LS 5 min  TPR L/R UT/LS 8 min       Nerve gliding  Chin tuck and cervical extension over table x 3 min        Shoulder mobs   DN with and without estim x30'   DN- 23'            TherEx        SNAGs  Scap squeeze 20x  Scap squeeze 20x       Cat cow   30x       Open books  Rev  Sidelying open books 10 x 5s ea       Doorway stretch  30\" x 3        Rows   Scap depression 10\"x20       Mechanical exam    Retraction 3x10  Retraction ext 4x10 Yellow    Neuro Re-Ed        Chin tucks         Scap retract        Scap depress         Median N. tensioners        SL shoulder abd         SL shoulder horizontal abd         Is Ts Ys        Radial nerve tensioner    2x15    TherAct        Patient education  Dry Needling 8min  DN x10'  Reviewed imaging                Precautions: h/o seizures (controlled by medication)  Past Medical History:   Diagnosis Date    Cervical radiculopathy     RESOLVED: [id number] buldging disc    Hyperlipidemia     Kidney stone     Migraines     Seizure (HCC)     age 24        "

## 2024-08-06 NOTE — TELEPHONE ENCOUNTER
Upon review of the In Basket request we were able to locate, review, and update the patient chart as requested for Mammogram.    Any additional questions or concerns should be emailed to the Practice Liaisons via the appropriate education email address, please do not reply via In Basket.    Thank you  Hanna Whitt MA   [facility]

## 2024-08-08 ENCOUNTER — OFFICE VISIT (OUTPATIENT)
Dept: PHYSICAL THERAPY | Facility: CLINIC | Age: 49
End: 2024-08-08
Payer: COMMERCIAL

## 2024-08-08 DIAGNOSIS — M54.2 CERVICAL PAIN (NECK): ICD-10-CM

## 2024-08-08 DIAGNOSIS — M54.12 CERVICAL RADICULOPATHY: Primary | ICD-10-CM

## 2024-08-08 PROCEDURE — 97140 MANUAL THERAPY 1/> REGIONS: CPT

## 2024-08-08 PROCEDURE — 97110 THERAPEUTIC EXERCISES: CPT

## 2024-08-08 NOTE — PROGRESS NOTES
"Daily Note     Today's date: 2024  Patient name: Karly Howell  : 1975  MRN: [id number]  Referring provider: Lombardi, Frank, DO  Dx:   Encounter Diagnosis     ICD-10-CM    1. Cervical radiculopathy  M54.12       2. Cervical pain (neck)  M54.2                      Subjective: Patient reports overall improvement in symptoms. She does report minimal pain in lateral deltoid at this time. She leaves for vacation tomorrow to help move children back in to college.       Objective: See treatment diary below      Assessment: Tolerated treatment well. Noted thoracic hypomobility (mid to upper thoracic spine). Performed Gr 5 PA mobilizations with multiple cavitations. She tolerated well with no adverse effects. Performed scapular strengthening. Plan to assess progress at next visit after 2 weeks of HEP. Emphasized the importance of being consistent with cervical retractions. Patient demonstrated fatigue post treatment, exhibited good technique with therapeutic exercises, and would benefit from continued PT to reduce limitation secondary to pain.      Plan: Progress note during next visit.      Insurance:  A/CMS Eval/ Re-eval POC expires FOTO Auth #/ Referral # Total units  Start date  Expiration date Extension  Visit limitation?  PT only or  PT+OT? Co-Insurance   BC [place]: CMS Eval 24   Auth is REQ    12 PT only none       [id number]     12                     Date 24   Visit Number    Manual        Thoracic PA      Perf Gr 5   CTJ      Perf thrust   STM TPR L/R UT/LS 8 min     Perf 5 min UT/LS   Nerve gliding         Shoulder mobs  DN with and without estim x30'   DN- 23'             TherEx        SNAGs  Scap squeeze 20x        Cat cow  30x        Open books  Sidelying open books 10 x 5s ea        Doorway stretch         Rows  Scap depression 10\"x20        Mechanical exam   Retraction 3x10  Retraction ext 4x10 Yellow     Neuro " Re-Ed        Chin tucks      Chin tuck + extension    Scap retract     Rows 12.5# 3x10    Scap depress      Shoulder extension 9# 3x10         Wall crawl RTB x10         Push up plus at wall 2x10    SL shoulder horizontal abd         Is Ts Ys        Radial nerve tensioner   2x15     TherAct        Patient education Dry Needling 8min  DN x10'  Reviewed imaging  Mechanical pain and red flags               Precautions: h/o seizures (controlled by medication)  Past Medical History:   Diagnosis Date    Cervical radiculopathy     RESOLVED: [id number] buldging disc    Hyperlipidemia     Kidney stone     Migraines     Seizure (HCC)     age 24

## 2024-08-21 ENCOUNTER — OFFICE VISIT (OUTPATIENT)
Dept: FAMILY MEDICINE CLINIC | Facility: CLINIC | Age: 49
End: 2024-08-21
Payer: COMMERCIAL

## 2024-08-21 ENCOUNTER — TELEPHONE (OUTPATIENT)
Age: 49
End: 2024-08-21

## 2024-08-21 VITALS
TEMPERATURE: 97 F | SYSTOLIC BLOOD PRESSURE: 132 MMHG | BODY MASS INDEX: 27.03 KG/M2 | HEART RATE: 80 BPM | WEIGHT: 143.2 LBS | RESPIRATION RATE: 18 BRPM | HEIGHT: 61 IN | DIASTOLIC BLOOD PRESSURE: 82 MMHG

## 2024-08-21 DIAGNOSIS — N39.0 ACUTE UTI (URINARY TRACT INFECTION): Primary | ICD-10-CM

## 2024-08-21 LAB
SL AMB  POCT GLUCOSE, UA: NEGATIVE
SL AMB LEUKOCYTE ESTERASE,UA: NORMAL
SL AMB POCT BILIRUBIN,UA: NEGATIVE
SL AMB POCT BLOOD,UA: NORMAL
SL AMB POCT CLARITY,UA: NORMAL
SL AMB POCT COLOR,UA: NORMAL
SL AMB POCT KETONES,UA: NEGATIVE
SL AMB POCT NITRITE,UA: NEGATIVE
SL AMB POCT PH,UA: 6
SL AMB POCT SPECIFIC GRAVITY,UA: 1.01
SL AMB POCT URINE PROTEIN: NEGATIVE
SL AMB POCT UROBILINOGEN: 0.2

## 2024-08-21 PROCEDURE — 81002 URINALYSIS NONAUTO W/O SCOPE: CPT | Performed by: FAMILY MEDICINE

## 2024-08-21 PROCEDURE — 99214 OFFICE O/P EST MOD 30 MIN: CPT | Performed by: FAMILY MEDICINE

## 2024-08-21 RX ORDER — NITROFURANTOIN 25; 75 MG/1; MG/1
100 CAPSULE ORAL 2 TIMES DAILY
Qty: 10 CAPSULE | Refills: 0 | Status: SHIPPED | OUTPATIENT
Start: 2024-08-21 | End: 2024-08-26

## 2024-08-21 NOTE — TELEPHONE ENCOUNTER
New Patient    What is the reason for the patient’s appointment?:patient called stating she is having some discomfort on her lower abdomen.  She feels her bladder is not emptying.  She is not sure if she has a uti at this time.  She stated she was a patient of Dr Sepulveda more than 3 years ago. First available appointment in November. Scheduled new pt appointment for 11/05/24.  Recommended patient to call her family doctor to see if he does have a uti since he is a new patient and no testing can be done.  She verbalized understanding and will see if pcp can see her or go to urgent care.     What office location does the patient prefer?:[place]     Does patient have Imaging/Lab Results:    Have patient records been requested?:  If No, are the records showing in Epic:       INSURANCE:   Do we accept the patient's insurance or is the patient Self-Pay?:    Insurance Provider:FRAMED   Plan Type/Number:   Member ID#:       HISTORY:   Has the patient had any previous Urologist(s)?:Dr Sepulveda 3 yrs +    Was the patient seen in the ED?:    Has the patient had any outside testing done?:no     Does the patient have a personal history of cancer?:

## 2024-08-23 ENCOUNTER — ANESTHESIA (OUTPATIENT)
Dept: ANESTHESIOLOGY | Facility: HOSPITAL | Age: 49
End: 2024-08-23

## 2024-08-23 ENCOUNTER — ANESTHESIA EVENT (OUTPATIENT)
Dept: ANESTHESIOLOGY | Facility: HOSPITAL | Age: 49
End: 2024-08-23

## 2024-08-24 LAB
BACTERIA UR CULT: ABNORMAL
Lab: ABNORMAL
SL AMB ANTIMICROBIAL SUSCEPTIBILITY: ABNORMAL

## 2024-08-26 ENCOUNTER — TELEPHONE (OUTPATIENT)
Age: 49
End: 2024-08-26

## 2024-08-26 DIAGNOSIS — N39.0 ACUTE UTI (URINARY TRACT INFECTION): Primary | ICD-10-CM

## 2024-08-26 RX ORDER — SULFAMETHOXAZOLE/TRIMETHOPRIM 800-160 MG
1 TABLET ORAL EVERY 12 HOURS SCHEDULED
Qty: 10 TABLET | Refills: 0 | Status: SHIPPED | OUTPATIENT
Start: 2024-08-26 | End: 2024-08-31

## 2024-08-26 NOTE — TELEPHONE ENCOUNTER
Patient was seen in the office on 8/21/24 for UTI symptoms.  She saw the results of her UA/CS and would like to discuss results.  Needs review.  She is still symptomatic and would like to know if the antibiotics should be extended.      Please advise and call patient back.      Preferred Pharmacy:  [id number] [place] [facility] [address] Phone: [phone]   Fax: [phone]

## 2024-08-26 NOTE — TELEPHONE ENCOUNTER
Reviewed urine culture. The macrobid that I prescribed her should have been good to treat her uti, but since she continues to have symptoms, I sent bactrim to take twice daily x 5 days to her pharmacy for this infection.

## 2024-08-27 ENCOUNTER — OFFICE VISIT (OUTPATIENT)
Dept: PHYSICAL THERAPY | Facility: CLINIC | Age: 49
End: 2024-08-27
Payer: COMMERCIAL

## 2024-08-27 DIAGNOSIS — M54.12 CERVICAL RADICULOPATHY: Primary | ICD-10-CM

## 2024-08-27 DIAGNOSIS — M54.2 CERVICAL PAIN (NECK): ICD-10-CM

## 2024-08-27 PROCEDURE — 97530 THERAPEUTIC ACTIVITIES: CPT

## 2024-08-27 NOTE — PROGRESS NOTES
"Daily Note     Today's date: 2024  Patient name: Karly Howell  : 1975  MRN: [id number]  Referring provider: Lombardi, Frank, DO  Dx:   Encounter Diagnosis     ICD-10-CM    1. Cervical radiculopathy  M54.12       2. Cervical pain (neck)  M54.2                    Subjective: Karly Howell reports a 85% reduction in pain. She is able to complete a full day of work with no increase in pain and states she has been sleeping  throughout the night. She believes she is ready for discharge at this time. She has follow up with orthopedics (9/3/24) and states she will \"probably say no to the injection if I still feel this good\".       Goals  Short Term Goals (4 weeks):  (24: All STG met)  - Patient will be independent in basic HEP 2-3 weeks (24: MET)  - Patient will report >50% reduction in pain (24: PROGRESSING)  - Patient will demonstrate >1/3 improvement in MMT grade as applicable (24: MET)  - Patient will work a full 8 hour day with a reported pain of <4/10 (24: PROGRESSING 7/10)  - Patient will be able to carry 10lb weight without reported increase in pain (24: NOT ATTEMPTED)    Long Term Goals (6 weeks): (24: All LTG met)  - Patient will be independent in a comprehensive home exercise program   - Patient FOTO score will improve by 9 points   - Patient will self-report >75% improvement in function   - Patient will be independent with all ADLs without a reported increase in pain  - Patient will be able to carry 10lb weight without reported increase in pain     Objective:     UE MMT (24: Grossly 4+ to 5/5)  RIGHT    LEFT  - Shoulder Shrug (C4): 4/5 (24: 4+/5)  4/5 (24: 4+/5)  - Shoulder Abduction (C5):  4+/5 (24: 4+/5)  4-/5 (24: 4+/5)  - Elbow Flexion (C6):  4+/5 (24: 5/5)  4/5 (24: 5/5)  - Elbow Extension (C7): 4+/5 (24: 5/5)  4-/5 (24: 4+/5)  - Thumb Extension (C8): 4+/5 (24: 5/5)  4+/5 (24: 5/5)  - Finger Adduction (T1): "  4+/5 (6/21/24: 5/5)  4+/5 (6/21/24: 5/5)        Cervical Spine Range of Motion  (8/27/24: Grossly no limitation without pain)  Flexion:  Moderately limited  without pain   (6/21/24: Minimally limited pain)   Extension:  Moderately limited  with pain   (6/21/24: Minimally limited no pain)   Lateral Flexion - Left:  Minimally limited  without pain (6/21/24: Minimally limited pain)   Lateral Flexion - Right:  Minimally limited  without pain (6/21/24: Minimally limited pain)  Rotation - Left:  Minimally limited  without pain   (6/21/24: 55 deg with pain)  Rotation - Right:  Minimally limited  without pain  (6/21/24: 70 deg no pain)    Shoulder Range of Motion  Flexion: WNL b/l  Abduction: WNL b/l   External Rotation: C7 b/l (6/21/24: C7 b/l) (8/27/24: T1)  Internal Rotation: L2 b/l  (6/21/24: T10 b/l) (8/27/24: T10)    Mechanical Assessment  In Standing:  - Retraction: Centralizing     Joint Play  OA: Normal  AA: Normal  C2: Normal  C3: Normal  C4: Normal  C5: Normal  C6: Hypomobile (8/27/24: wnl)  C7: Hypomobile (8/27/24: wnl)  CTJ: Hypomobile (8/27/24: wnl)  Mid-Thoracic Spine: Hypomobile (8/27/24: grossly same)      Assessment: Karly Howell has been attending OPPT for 22 visits over the course of 14 weeks. During this time she has made significant gains in cervical/ left shoulder mobility and strength. Due to these gains she is able to complete a full day of work without an increase in pain. She is also able to have a full nights sleep without limitation secondary to pain. At this time, Karly has achieved all of the goals established at her initial evaluation. She has also demonstrated compliance with her comprehensive HEP. At this time, she no longer requires skilled PT and will be discharged to HEP. Reviewed HEP with Karly and provided her with a blue TB for maximum independence. Karly was agreeable with discharge plans at this time with no additional concerns or questions. She was an absolute pleasure to work with.  "Thank you for the opportunity to share in her care.      Plan: Patient will be discharged to HEP.      Insurance:  AMA/CMS Eval/ Re-eval POC expires FOTO Auth #/ Referral # Total units  Start date  Expiration date Extension  Visit limitation?  PT only or  PT+OT? Co-Insurance   [place]: CMS Eval 5/21/24   Auth is REQ  5/21 8/21  12 PT only none       [id number]   6/28 9/28  12                     Date 7/25/24 7/31/24 8/2/24 8/5/24 8/8/24 8/27/24   Visit Number 17/24 18/24 19/24 20/24 21/24 22/24   Manual         Thoracic PA      Perf Gr 5    CTJ      Perf thrust    STM TPR L/R UT/LS 8 min     Perf 5 min UT/LS    Nerve gliding          Shoulder mobs  DN with and without estim x30'   DN- 23'               TherEx         SNAGs  Scap squeeze 20x         Cat cow  30x         Open books  Sidelying open books 10 x 5s ea         Doorway stretch          Rows  Scap depression 10\"x20         Mechanical exam   Retraction 3x10  Retraction ext 4x10 Yellow      Neuro Re-Ed         Chin tucks      Chin tuck + extension     Scap retract     Rows 12.5# 3x10     Scap depress      Shoulder extension 9# 3x10          Wall crawl RTB x10          Push up plus at wall 2x10     SL shoulder horizontal abd          Is Ts Ys         Radial nerve tensioner   2x15      TherAct         Patient education Dry Needling 8min  DN x10'  Reviewed imaging  Mechanical pain and red flags Assessment and HEP 25 min                 Precautions: h/o seizures (controlled by medication)  Past Medical History:   Diagnosis Date    Cervical radiculopathy     RESOLVED: [id number] buldging disc    Hyperlipidemia     Kidney stone     Migraines     Seizure (HCC)     age 24            "

## 2024-08-29 ENCOUNTER — APPOINTMENT (OUTPATIENT)
Dept: PHYSICAL THERAPY | Facility: CLINIC | Age: 49
End: 2024-08-29
Payer: COMMERCIAL

## 2024-09-04 ENCOUNTER — OFFICE VISIT (OUTPATIENT)
Dept: PAIN MEDICINE | Facility: CLINIC | Age: 49
End: 2024-09-04
Payer: COMMERCIAL

## 2024-09-04 VITALS
WEIGHT: 143 LBS | SYSTOLIC BLOOD PRESSURE: 128 MMHG | HEART RATE: 86 BPM | BODY MASS INDEX: 27 KG/M2 | HEIGHT: 61 IN | DIASTOLIC BLOOD PRESSURE: 75 MMHG

## 2024-09-04 DIAGNOSIS — G89.29 CHRONIC LEFT SHOULDER PAIN: Primary | ICD-10-CM

## 2024-09-04 DIAGNOSIS — M54.12 CERVICAL RADICULOPATHY: ICD-10-CM

## 2024-09-04 DIAGNOSIS — M47.816 LUMBAR SPONDYLOSIS: ICD-10-CM

## 2024-09-04 DIAGNOSIS — M25.512 CHRONIC LEFT SHOULDER PAIN: Primary | ICD-10-CM

## 2024-09-04 PROCEDURE — 99214 OFFICE O/P EST MOD 30 MIN: CPT | Performed by: STUDENT IN AN ORGANIZED HEALTH CARE EDUCATION/TRAINING PROGRAM

## 2024-09-04 NOTE — PROGRESS NOTES
Pain Medicine Follow-Up Note    Assessment:  1. Chronic left shoulder pain    2. Cervical radiculopathy    3. Lumbar spondylosis      Patient presents as a follow-up visit after last being seen on 7/15/2020 for for symptoms of cervical spondylosis and cervical radiculopathy.  At that visit discussed with patient that she would be a candidate for C7-T1 cervical epidural steroid injection and she can continue Mobic 15 mg daily as needed.  Patient symptoms are better rating her pain as a 1 out of 10 numeric rating scale.  Symptoms are worse in the morning and evening and the pain is unrelated.  The quality pain is burning, dull aching pain primarily in her left neck radiating down her left arm into her shoulder.  Symptoms consistent with her disc herniation at C5-C6.    On discussion with patient today she states she is doing quite well in regards to her cervical radiculopathy and now only has left shoulder pain that is mild.  Patient with tenderness to palpation in the left shoulder.  Range of motion is normal.  Given patient's left shoulder pain will update PT prescription to focus on this and also on her low back.  Plan:  Ambulatory referral to PT for left shoulder and low back pain  Continue apap 650 mg q6hr prn   Orders Placed This Encounter   Procedures   • Ambulatory referral to Physical Therapy     Standing Status:   Future     Standing Expiration Date:   9/4/2025     Referral Priority:   Routine     Referral Type:   Physical Therapy     Referral Reason:   Specialty Services Required     Requested Specialty:   Physical Therapy     Number of Visits Requested:   1     Expiration Date:   9/4/2025         No orders of the defined types were placed in this encounter.      My impressions and treatment recommendations were discussed in detail with the patient who verbalized understanding and had no further questions.        Follow-up is planned in four weeks time or sooner as warranted.  Discharge instructions were  provided. I personally saw and examined the patient and I agree with the above discussed plan of care.    History of Present Illness:    Karly Howell is a 48 y.o. female who presents to [facility] for interval re-evaluation of the above stated pain complaints. The patient has a past medical and chronic pain history as outlined in the assessment section. She was last seen on 7/15/2024.    Patient presents as a follow-up visit after last being seen on 7/15/2024 for for symptoms of cervical spondylosis and cervical radiculopathy.  At that visit discussed with patient that she would be a candidate for C7-T1 cervical epidural steroid injection and she can continue Mobic 15 mg daily as needed.  Patient symptoms are better rating her pain as a 1 out of 10 numeric rating scale.  Symptoms are worse in the morning and evening and the pain is unrelated.  The quality pain is burning, dull aching pain primarily in her left neck radiating down her left arm into her shoulder.  Symptoms consistent with her disc herniation at C5-C6.      Other than as stated above, the patient denies any interval changes in medications, medical condition, mental condition, symptoms, or allergies since the last office visit.         Review of Systems:    Review of Systems   Constitutional:  Negative for unexpected weight change.   HENT:  Negative for ear pain.    Eyes:  Negative for visual disturbance.   Respiratory:  Negative for shortness of breath and wheezing.    Gastrointestinal:  Negative for abdominal pain.   Musculoskeletal:  Positive for neck pain and neck stiffness. Negative for back pain.        Body stiffness   Neurological:  Positive for headaches. Negative for weakness and numbness.   Psychiatric/Behavioral:  Negative for decreased concentration.          Past Medical History:   Diagnosis Date   • Cervical radiculopathy     RESOLVED: [id number] buldging disc   • Hyperlipidemia    • Kidney stone    • Migraines     • Seizure (HCC)     age 24       Past Surgical History:   Procedure Laterality Date   • ADENOIDECTOMY     • COLONOSCOPY     • EGD AND COLONOSCOPY  2019   • LITHOTRIPSY      RENAL   • OVARIAN CYST REMOVAL     • [place] HYSTEROSCOPY BX ENDOMETRIUM&/POLYPC W/WO D&C N/A 10/28/2019    Procedure: OPERATIVE HYSTEROSCOPY (MYOSURE),POLYPECTOMY,D AND C;  Surgeon: Roel Rothman MD;  Location: [facility];  Service: Gynecology       Family History   Problem Relation Age of Onset   • Multiple sclerosis Mother    • Colon cancer Maternal Grandmother 70   • Rheum arthritis Maternal Grandmother    • Arthritis Family    • Colon cancer Family    • Osteoporosis Family    • Anxiety disorder Father    • Chromosomal disorder Cousin    • No Known Problems Sister    • No Known Problems Daughter    • Skin cancer Maternal Grandfather 65        not sure of exact age.   • No Known Problems Paternal Grandmother    • No Known Problems Paternal Grandfather    • No Known Problems Daughter    • No Known Problems Son    • No Known Problems Maternal Aunt    • No Known Problems Maternal Aunt    • No Known Problems Paternal Aunt        Social History     Occupational History   • Not on file   Tobacco Use   • Smoking status: Former     Current packs/day: 0.00     Average packs/day: 1 pack/day for 20.0 years (20.0 ttl pk-yrs)     Types: Cigarettes     Start date: 6/15/1992     Quit date: 2007     Years since quittin.3     Passive exposure: Never   • Smokeless tobacco: Never   Vaping Use   • Vaping status: Never Used   Substance and Sexual Activity   • Alcohol use: Not Currently     Comment: rare   • Drug use: Not Currently     Types: Marijuana     Comment: gummy   • Sexual activity: Not on file         Current Outpatient Medications:   •  Aimovig 140 MG/ML SOAJ, ADMINISTER 1 ML UNDER THE SKIN EVERY 4 WEEKS, Disp: , Rfl:   •  carBAMazepine (CARBATROL) 300 MG 12 hr capsule, 300 mg every 12 (twelve) hours , Disp: , Rfl: 1  •   "Casanthranol-Docusate Sodium (STOOL SOFTENER PLUS PO), Take by mouth Rarely takes as needed, Disp: , Rfl:   •  SUMAtriptan (IMITREX) 100 mg tablet, Take 1 tablet (100 mg total) by mouth once as needed for migraine for up to 60 doses, Disp: 30 tablet, Rfl: 1  •  valACYclovir (VALTREX) 500 mg tablet, 500 mg daily As needed, Disp: , Rfl: 2    Allergies   Allergen Reactions   • Ciprofloxacin Lightheadedness     dizzy   • Phenytoin Rash     Reaction Date: 05Oct2010;        Physical Exam:    /75   Pulse 86   Ht 5' 1.25\" (1.556 m)   Wt 64.9 kg (143 lb)   BMI 26.80 kg/m²     Constitutional:normal, well developed, well nourished, alert, in no distress and non-toxic and no overt pain behavior.  Eyes:anicteric  HEENT:grossly intact  Neck:supple, symmetric, trachea midline and no masses   Pulmonary:even and unlabored  Cardiovascular:No edema or pitting edema present  Skin:Normal without rashes or lesions and well hydrated  Psychiatric:Mood and affect appropriate  Neurologic:Cranial Nerves II-XII grossly intact  Musculoskeletal:normal gait. TTP in left shoulder. Normal range of motion.       Imaging  MRI CERVICAL SPINE WITHOUT CONTRAST     INDICATION: M54.12: Radiculopathy, cervical region  M54.2: Cervicalgia.     COMPARISON: 2/26/2014.     TECHNIQUE:  Multiplanar, multisequence imaging of the cervical spine was performed. .        IMAGE QUALITY:  Diagnostic     FINDINGS:     ALIGNMENT: Cervical straightening noted.  No compression fracture.  No subluxation.  No scoliosis.     MARROW SIGNAL:  Normal marrow signal is identified within the visualized bony structures.  No discrete marrow lesion.     CERVICAL AND VISUALIZED THORACIC CORD:  Normal signal within the visualized cord.     PREVERTEBRAL AND PARASPINAL SOFT TISSUES:  Normal.     VISUALIZED POSTERIOR FOSSA:  The visualized posterior fossa demonstrates no abnormal signal.     CERVICAL DISC SPACES:     C2-C3:  Normal.     C3-C4:  Normal.     C4-C5: Disc bulge with " minor central annular fissuring. Mild indentation of the thecal sac. Foramina are widely patent.     C5-C6: Disc desiccation with mild loss of disc height. Disc osteophyte complex and facet arthrosis. Minor cord indentation without cord signal abnormality. Mild right and moderate left foraminal stenosis. Correlate clinically for left greater than right   C6 radiculopathy.     C6-C7: Disc desiccation with disc height loss. Disc osteophyte complex including left greater than right uncovertebral hypertrophy. Bilateral facet arthrosis. Moderate left and mild right foraminal narrowing. Correlate clinically for left greater than   right C7 radiculopathy.     C7-T1:  Normal.     UPPER THORACIC DISC SPACES:  Normal.     OTHER FINDINGS:  None.     IMPRESSION:  Mild to moderate spondylotic changes of the cervical spine as detailed abov    XR SHOULDER 2+ VW LEFT     INDICATION: M25.512: Pain in left shoulder.     COMPARISON: None  3 views     FINDINGS:     No acute fracture or dislocation.     No significant degenerative changes.     No lytic or blastic osseous lesion.     Unremarkable soft tissues.     IMPRESSION:     No acute osseous abnormality.    No orders to display         Orders Placed This Encounter   Procedures   • Ambulatory referral to Physical Therapy

## 2024-09-06 ENCOUNTER — ANESTHESIA EVENT (OUTPATIENT)
Dept: GASTROENTEROLOGY | Facility: AMBULARY SURGERY CENTER | Age: 49
End: 2024-09-06
Payer: COMMERCIAL

## 2024-09-06 ENCOUNTER — HOSPITAL ENCOUNTER (OUTPATIENT)
Dept: GASTROENTEROLOGY | Facility: AMBULARY SURGERY CENTER | Age: 49
Setting detail: OUTPATIENT SURGERY
End: 2024-09-06
Payer: COMMERCIAL

## 2024-09-06 VITALS
RESPIRATION RATE: 20 BRPM | SYSTOLIC BLOOD PRESSURE: 126 MMHG | HEIGHT: 60 IN | HEART RATE: 78 BPM | DIASTOLIC BLOOD PRESSURE: 70 MMHG | TEMPERATURE: 97.2 F | BODY MASS INDEX: 26.7 KG/M2 | WEIGHT: 136 LBS | OXYGEN SATURATION: 98 %

## 2024-09-06 DIAGNOSIS — R14.0 BLOATING: ICD-10-CM

## 2024-09-06 DIAGNOSIS — R19.4 CHANGE IN BOWEL HABIT: ICD-10-CM

## 2024-09-06 DIAGNOSIS — R11.0 NAUSEA: ICD-10-CM

## 2024-09-06 LAB
EXT PREGNANCY TEST URINE: NEGATIVE
EXT. CONTROL: NORMAL

## 2024-09-06 PROCEDURE — 88305 TISSUE EXAM BY PATHOLOGIST: CPT | Performed by: PATHOLOGY

## 2024-09-06 PROCEDURE — 81025 URINE PREGNANCY TEST: CPT | Performed by: STUDENT IN AN ORGANIZED HEALTH CARE EDUCATION/TRAINING PROGRAM

## 2024-09-06 PROCEDURE — 45385 COLONOSCOPY W/LESION REMOVAL: CPT | Performed by: INTERNAL MEDICINE

## 2024-09-06 PROCEDURE — 43239 EGD BIOPSY SINGLE/MULTIPLE: CPT | Performed by: INTERNAL MEDICINE

## 2024-09-06 RX ORDER — SODIUM CHLORIDE, SODIUM LACTATE, POTASSIUM CHLORIDE, CALCIUM CHLORIDE 600; 310; 30; 20 MG/100ML; MG/100ML; MG/100ML; MG/100ML
INJECTION, SOLUTION INTRAVENOUS CONTINUOUS PRN
Status: DISCONTINUED | OUTPATIENT
Start: 2024-09-06 | End: 2024-09-06

## 2024-09-06 RX ORDER — PROPOFOL 10 MG/ML
INJECTION, EMULSION INTRAVENOUS AS NEEDED
Status: DISCONTINUED | OUTPATIENT
Start: 2024-09-06 | End: 2024-09-06

## 2024-09-06 RX ORDER — LIDOCAINE HYDROCHLORIDE 10 MG/ML
INJECTION, SOLUTION EPIDURAL; INFILTRATION; INTRACAUDAL; PERINEURAL AS NEEDED
Status: DISCONTINUED | OUTPATIENT
Start: 2024-09-06 | End: 2024-09-06

## 2024-09-06 RX ADMIN — PROPOFOL 50 MG: 10 INJECTION, EMULSION INTRAVENOUS at 12:36

## 2024-09-06 RX ADMIN — PROPOFOL 50 MG: 10 INJECTION, EMULSION INTRAVENOUS at 12:39

## 2024-09-06 RX ADMIN — SODIUM CHLORIDE, SODIUM LACTATE, POTASSIUM CHLORIDE, AND CALCIUM CHLORIDE: .6; .31; .03; .02 INJECTION, SOLUTION INTRAVENOUS at 12:05

## 2024-09-06 RX ADMIN — PROPOFOL 50 MG: 10 INJECTION, EMULSION INTRAVENOUS at 12:42

## 2024-09-06 RX ADMIN — LIDOCAINE HYDROCHLORIDE 50 MG: 10 INJECTION, SOLUTION EPIDURAL; INFILTRATION; INTRACAUDAL at 12:32

## 2024-09-06 RX ADMIN — PROPOFOL 120 MG: 10 INJECTION, EMULSION INTRAVENOUS at 12:32

## 2024-09-06 RX ADMIN — PROPOFOL 30 MG: 10 INJECTION, EMULSION INTRAVENOUS at 12:33

## 2024-09-06 NOTE — DISCHARGE INSTRUCTIONS
Colonoscopy   WHAT YOU NEED TO KNOW:   A colonoscopy is a procedure to examine the inside of your colon (intestine) with a scope. Polyps or tissue growths may have been removed during your colonoscopy. It is normal to feel bloated and to have some abdominal discomfort. You should be passing gas. If you have hemorrhoids or you had polyps removed, you may have a small amount of bleeding.        DISCHARGE INSTRUCTIONS:   Seek care immediately if:   You have sudden, severe abdominal pain.     You have problems swallowing.     You have a large amount of black, sticky bowel movements or blood in your bowel movements.     You have sudden trouble breathing.     You feel weak, lightheaded, or faint or your heart beats faster than normal for you.     Contact your healthcare provider if:   You have a fever and chills.      You have nausea or are vomiting.      Your abdomen is bloated or feels full and hard.     You have abdominal pain.   You have black, sticky bowel movements or blood in your bowel movements.  You have not had a bowel movement for 3 days after your procedure.  You have rash or hives.  You have questions or concerns about your procedure.    Activity:   Do not lift, strain, or run for 24 hours after your procedure.     Rest after your procedure. You have been given medicine to relax you. Do not drive or make important decisions until the day after your procedure. Return to your normal activity as directed.     Relieve gas and discomfort from bloating by lying on your right side with a heating pad on your abdomen. You may need to take short walks to help the gas move out. Eat small meals until bloating is relieved.  Follow up with your healthcare provider as directed: Write down your questions so you remember to ask them during your visits.     If you take a “blood thinner”, please review the specific instructions from your endoscopist about when you should resume it. These can be found in the “Recommendation”  and “Your Medication list” sections of this After Visit Summary.      14-Dec-2022 12:19

## 2024-09-06 NOTE — H&P
History and Physical - [facility]  Karly JACKIE Howell 48 y.o. female MRN: [id number]        HPI: 48-year-old female with history of GERD reports having dyspeptic symptoms of discomfort and nausea.  She also noticed change in bowel habits    Historical Information   Past Medical History:   Diagnosis Date    Cervical radiculopathy     RESOLVED: [id number] buldging disc    GERD (gastroesophageal reflux disease)     Hyperlipidemia     Kidney stone     Migraines     Seizure (HCC)     age 24     Past Surgical History:   Procedure Laterality Date    ADENOIDECTOMY      COLONOSCOPY      EGD AND COLONOSCOPY  2019    LITHOTRIPSY  2006    RENAL    OVARIAN CYST REMOVAL  2012    DC HYSTEROSCOPY BX ENDOMETRIUM&/POLYPC W/WO D&C N/A 10/28/2019    Procedure: OPERATIVE HYSTEROSCOPY (MYOSURE),POLYPECTOMY,D AND C;  Surgeon: Roel Rothman MD;  Location: [facility];  Service: Gynecology     Social History   Social History     Substance and Sexual Activity   Alcohol Use Not Currently    Comment: rare     Social History     Substance and Sexual Activity   Drug Use Not Currently    Types: Marijuana    Comment: gummy     Social History     Tobacco Use   Smoking Status Former    Current packs/day: 0.00    Average packs/day: 1 pack/day for 20.0 years (20.0 ttl pk-yrs)    Types: Cigarettes    Start date: 6/15/1992    Quit date: 2007    Years since quittin.3    Passive exposure: Never   Smokeless Tobacco Never     Family History   Problem Relation Age of Onset    Multiple sclerosis Mother     Colon cancer Maternal Grandmother 70    Rheum arthritis Maternal Grandmother     Arthritis Family     Colon cancer Family     Osteoporosis Family     Anxiety disorder Father     Chromosomal disorder Cousin     No Known Problems Sister     No Known Problems Daughter     Skin cancer Maternal Grandfather 65        not sure of exact age.    No Known Problems Paternal Grandmother     No Known Problems Paternal Grandfather     No Known  Problems Daughter     No Known Problems Son     No Known Problems Maternal Aunt     No Known Problems Maternal Aunt     No Known Problems Paternal Aunt        Meds/Allergies     Not in a hospital admission.    Allergies   Allergen Reactions    Ciprofloxacin Lightheadedness     dizzy    Phenytoin Rash     Reaction Date: 05Oct2010;        Objective     Blood pressure 126/64, pulse 79, temperature (!) 97.1 °F (36.2 °C), temperature source Temporal, resp. rate 22, height 5' (1.524 m), weight 61.7 kg (136 lb), last menstrual period 07/06/2024, SpO2 99%, not currently breastfeeding.    Physical Exam:    Chest- CTA  Heart- RRR  Abdomen- NT/ND  Extremities- No edema    ASSESSMENT:     Dyspepsia, change in bowel habits    PLAN:    EGD and colonoscopy

## 2024-09-06 NOTE — ANESTHESIA POSTPROCEDURE EVALUATION
Post-Op Assessment Note    CV Status:  Stable  Pain Score: 0    Pain management: adequate       Mental Status:  Alert and awake   Hydration Status:  Euvolemic   PONV Controlled:  Controlled   Airway Patency:  Patent     Post Op Vitals Reviewed: Yes    No anethesia notable event occurred.    Staff: CRNA               BP   121/64   Temp      Pulse  80   Resp   16   SpO2   98

## 2024-09-06 NOTE — ANESTHESIA PREPROCEDURE EVALUATION
Procedure:  COLONOSCOPY  EGD    Relevant Problems   ANESTHESIA (within normal limits)      CARDIO   (+) Classic migraine with aura   (+) Hyperlipemia, mixed      ENDO (within normal limits)      GI/HEPATIC (within normal limits)      /RENAL (within normal limits)      GYN (within normal limits)      HEMATOLOGY (within normal limits)      MUSCULOSKELETAL (within normal limits)      NEURO/PSYCH   (+) Anxiety disorder   (+) Classic migraine with aura   (+) Epilepsy (HCC)   (+) Generalized nonconvulsive seizure (HCC)   (+) Localization-related (focal) (partial) symptomatic epilepsy and epileptic syndromes with complex partial seizures, not intractable, without status epilepticus (HCC)   (+) Seizure disorder, temporal lobe, intractable (HCC)      PULMONARY (within normal limits)        Physical Exam    Airway    Mallampati score: III  TM Distance: >3 FB  Neck ROM: full     Dental   No notable dental hx     Cardiovascular  Rhythm: regular, Rate: normal, Cardiovascular exam normal    Pulmonary  Pulmonary exam normal Breath sounds clear to auscultation    Other Findings  post-pubertal.      Anesthesia Plan  ASA Score- 2     Anesthesia Type- IV sedation with anesthesia with ASA Monitors.         Additional Monitors:     Airway Plan:            Plan Factors-Exercise tolerance (METS): >4 METS.    Chart reviewed. EKG reviewed. Imaging results reviewed. Existing labs reviewed. Patient summary reviewed.    Patient is not a current smoker.  Patient instructed to abstain from smoking on day of procedure. Patient did not smoke on day of surgery.            Induction- intravenous.    Postoperative Plan-     Perioperative Resuscitation Plan - Level 1 - Full Code.       Informed Consent- Anesthetic plan and risks discussed with patient.  I personally reviewed this patient with the CRNA. Discussed and agreed on the Anesthesia Plan with the CRNA..

## 2024-09-09 PROCEDURE — 88305 TISSUE EXAM BY PATHOLOGIST: CPT | Performed by: PATHOLOGY

## 2024-09-13 ENCOUNTER — TELEPHONE (OUTPATIENT)
Age: 49
End: 2024-09-13

## 2024-09-13 NOTE — TELEPHONE ENCOUNTER
Pt received Colonoscopy results. Aware of precancerous polyps however has concerns over 5 year recall due to family history of cancer. Would like to discuss with a provider.

## 2024-09-13 NOTE — TELEPHONE ENCOUNTER
"Per patient response on Eadvice    ""You can disregard. I will get OTC astepro instead. That was cleared by my heart team. My OOP is met so wanted to use insurance. I don't want to try another steroid spray and have issues with my blood sugar. \""  " Recall set for 3 years

## 2024-09-15 ENCOUNTER — NURSE TRIAGE (OUTPATIENT)
Dept: OTHER | Facility: OTHER | Age: 49
End: 2024-09-15

## 2024-09-15 NOTE — TELEPHONE ENCOUNTER
"Reason for Disposition   Patient sounds very sick or weak to the triager    Answer Assessment - Initial Assessment Questions  1. SYMPTOM:  \"What's the main symptom you're concerned about?\" (e.g., pain, redness, swelling, pus)      Had a colonoscopy / endoscopy last week on Friday; had IV in hand. IV went through vein; had a little hematoma. Did not have a ton of bruising afterwards. Patient was vacuuming today; same spot where she had the initial hematoma filled with blood. Significant (marble-sized swelling filled with blood); patient put pressure on it.     2. ONSET: \"When did the bump  start?\"      Today    3. IV WHAT: \"What kind of IV line do you have?\" (e.g., central line, PICC, peripheral IV)      Peripheral IV    4. IV WHERE: \"Where does the IV enter your body?\"      Right hand    5. IV WHEN: \"When was this IV put in?\"      9/6  6. IV WHY: \"Why do you have this IV line?\"      Colonoscopy, endoscopy  7. IV RUNNING OK: \"Is the IV running OK?\" (e.g., running OK, running slowly, not running, unable to flush)       Removed  8. PAIN: \"Is there any pain?\" If Yes, ask: \"How bad is the pain?\"   (e.g., scale 1-10; or mild, moderate, severe)      Tender to touch; almost itchy at first - put heat and ice on it  9. OTHER SYMPTOMS: \"Do you have any other symptoms?\" (e.g., fever, shaking chills, new weakness, pus)      Denies    Protocols used: IV Site (Skin) Symptoms-ADULT-    "

## 2024-09-15 NOTE — TELEPHONE ENCOUNTER
Per on-call provider, please continue to apply pressure and ice. Keep arm elevated when possible. Schedule an office visit for tomorrow.    Appointment scheduled for 1:45pm with Nirmala Hemphill.

## 2024-09-16 ENCOUNTER — PATIENT MESSAGE (OUTPATIENT)
Dept: FAMILY MEDICINE CLINIC | Facility: CLINIC | Age: 49
End: 2024-09-16

## 2024-09-16 ENCOUNTER — OFFICE VISIT (OUTPATIENT)
Dept: FAMILY MEDICINE CLINIC | Facility: CLINIC | Age: 49
End: 2024-09-16
Payer: COMMERCIAL

## 2024-09-16 VITALS
BODY MASS INDEX: 28.27 KG/M2 | SYSTOLIC BLOOD PRESSURE: 120 MMHG | RESPIRATION RATE: 20 BRPM | HEART RATE: 92 BPM | DIASTOLIC BLOOD PRESSURE: 78 MMHG | HEIGHT: 60 IN | TEMPERATURE: 97.7 F | WEIGHT: 144 LBS

## 2024-09-16 DIAGNOSIS — R10.9 FLANK PAIN: ICD-10-CM

## 2024-09-16 DIAGNOSIS — N39.0 RECURRENT UTI: Primary | ICD-10-CM

## 2024-09-16 DIAGNOSIS — S60.221A HEMATOMA OF RIGHT HAND: Primary | ICD-10-CM

## 2024-09-16 DIAGNOSIS — Z87.442 HISTORY OF NEPHROLITHIASIS: ICD-10-CM

## 2024-09-16 DIAGNOSIS — R31.9 HEMATURIA, UNSPECIFIED TYPE: ICD-10-CM

## 2024-09-16 DIAGNOSIS — R39.15 URINARY URGENCY: ICD-10-CM

## 2024-09-16 LAB
SL AMB  POCT GLUCOSE, UA: ABNORMAL
SL AMB LEUKOCYTE ESTERASE,UA: ABNORMAL
SL AMB POCT BILIRUBIN,UA: ABNORMAL
SL AMB POCT BLOOD,UA: ABNORMAL
SL AMB POCT CLARITY,UA: CLEAR
SL AMB POCT COLOR,UA: YELLOW
SL AMB POCT KETONES,UA: ABNORMAL
SL AMB POCT NITRITE,UA: ABNORMAL
SL AMB POCT PH,UA: 6
SL AMB POCT SPECIFIC GRAVITY,UA: >1.03
SL AMB POCT URINE PROTEIN: ABNORMAL
SL AMB POCT UROBILINOGEN: 0.2

## 2024-09-16 PROCEDURE — 99214 OFFICE O/P EST MOD 30 MIN: CPT | Performed by: NURSE PRACTITIONER

## 2024-09-16 PROCEDURE — 81002 URINALYSIS NONAUTO W/O SCOPE: CPT | Performed by: NURSE PRACTITIONER

## 2024-09-16 RX ORDER — CEFDINIR 300 MG/1
300 CAPSULE ORAL EVERY 12 HOURS SCHEDULED
Qty: 14 CAPSULE | Refills: 0 | Status: SHIPPED | OUTPATIENT
Start: 2024-09-16 | End: 2024-09-23

## 2024-09-16 NOTE — PROGRESS NOTES
Ambulatory Visit  Name: Karly Howell      : 1975      MRN: [id number]  Encounter Provider: GAGANDEEP Dorantes  Encounter Date: 2024   Encounter department: [facility]    Assessment & Plan  Hematoma of right hand  Reassurance provided, no intervention needed       Urinary urgency  Will send urine for culture and start on cefdinir.  If culture is positive, will have her complete the course of antibiotics and repeat a urinalysis 1 week after completion.  If culture comes back negative, will check a CT scan to rule out nephrolithiasis.  Keep urology follow-up as scheduled  Orders:    POCT urine dip    cefdinir (OMNICEF) 300 mg capsule; Take 1 capsule (300 mg total) by mouth every 12 (twelve) hours for 7 days    Urine culture    Flank pain    Orders:    POCT urine dip    cefdinir (OMNICEF) 300 mg capsule; Take 1 capsule (300 mg total) by mouth every 12 (twelve) hours for 7 days    Urine culture    History of nephrolithiasis            History of Present Illness     Here today for evaluation of right hand IV site.  Reports that she had a colonoscopy in , and was told that the IV went through the vein causing a small hematoma.  Yesterday while she was vacuuming, she noticed a marble sized swelling in that site followed by diffuse bruising through her hand.  No other symptoms associated with this.  Also with concern of flank/low back pain.  She was treated for UTI last month.  Has follow-up with urology next month.  Still feels like she has to urinate frequently.          Review of Systems   Constitutional: Negative.    Genitourinary:         See HPI   Skin:  Positive for wound.   All other systems reviewed and are negative.    Current Outpatient Medications on File Prior to Visit   Medication Sig Dispense Refill    Aimovig 140 MG/ML SOAJ ADMINISTER 1 ML UNDER THE SKIN EVERY 4 WEEKS      carBAMazepine (CARBATROL) 300 MG 12 hr capsule 300 mg every 12 (twelve) hours   1     Casanthranol-Docusate Sodium (STOOL SOFTENER PLUS PO) Take by mouth Rarely takes as needed      SUMAtriptan (IMITREX) 100 mg tablet Take 1 tablet (100 mg total) by mouth once as needed for migraine for up to 60 doses 30 tablet 1    valACYclovir (VALTREX) 500 mg tablet 500 mg daily As needed  2     No current facility-administered medications on file prior to visit.      Social History     Tobacco Use    Smoking status: Former     Current packs/day: 0.00     Average packs/day: 1 pack/day for 20.0 years (20.0 ttl pk-yrs)     Types: Cigarettes     Start date: 6/15/1992     Quit date: 2007     Years since quittin.3     Passive exposure: Never    Smokeless tobacco: Never   Vaping Use    Vaping status: Never Used   Substance and Sexual Activity    Alcohol use: Not Currently     Comment: rare    Drug use: Not Currently     Types: Marijuana     Comment: gummy    Sexual activity: Not on file         Objective     /78   Pulse 92   Temp 97.7 °F (36.5 °C)   Resp 20   Ht 5' (1.524 m)   Wt 65.3 kg (144 lb)   LMP 2024 (Approximate)   BMI 28.12 kg/m²     Physical Exam  Vitals and nursing note reviewed.   Constitutional:       General: She is not in acute distress.     Appearance: Normal appearance. She is well-developed. She is not diaphoretic.   Eyes:      Conjunctiva/sclera: Conjunctivae normal.   Pulmonary:      Effort: Pulmonary effort is normal. No respiratory distress.   Skin:     Comments: Right hand dorsal aspect ecchymotic.  No soft tissue swelling.  Distal pulses palpable, normal perfusion   Neurological:      Mental Status: She is alert.   Psychiatric:         Mood and Affect: Mood normal.         Behavior: Behavior normal.          Mohs Rapid Report Verbiage: The area of clinically evident tumor was marked with skin marking ink and appropriately hatched.  The initial incision was made following the Mohs approach through the skin.  The specimen was taken to the lab, divided into the necessary number of pieces, chromacoded and processed according to the Mohs protocol.  This was repeated in successive stages until a tumor free defect was achieved.

## 2024-09-18 LAB
BACTERIA UR CULT: NORMAL
Lab: NORMAL

## 2024-09-19 ENCOUNTER — HOSPITAL ENCOUNTER (OUTPATIENT)
Dept: RADIOLOGY | Facility: HOSPITAL | Age: 49
Discharge: HOME | End: 2024-09-19
Payer: COMMERCIAL

## 2024-09-19 ENCOUNTER — TELEPHONE (OUTPATIENT)
Dept: FAMILY MEDICINE CLINIC | Facility: CLINIC | Age: 49
End: 2024-09-19

## 2024-09-19 DIAGNOSIS — N39.0 RECURRENT UTI: ICD-10-CM

## 2024-09-19 DIAGNOSIS — R31.9 HEMATURIA, UNSPECIFIED TYPE: ICD-10-CM

## 2024-09-19 PROCEDURE — 76775 US EXAM ABDO BACK WALL LIM: CPT

## 2024-09-19 NOTE — TELEPHONE ENCOUNTER
Patient was advised of Dr.Lombardi's message Patient stated that she has an  urology appt in  November,Patient is requesting to speak with provider and an earlier urology appt.. Patient stated she is currently taking cefdinir ,should patient continue with antibiotics. Please call patient with any updates.

## 2024-09-19 NOTE — TELEPHONE ENCOUNTER
Called pt to discuss results, left message for pt to call back.  Pt had an US of urinary system.  She has B/L non obstructive stones but she does have rt hydronephrosis - which implies that she could have an obstruction for some other reason.  Could explain her recurrent UTI's.  Looks like she has an appt with urology which is the next step.    Ok to give message when she calls back

## 2024-10-02 ENCOUNTER — OFFICE VISIT (OUTPATIENT)
Dept: UROLOGY | Facility: AMBULATORY SURGERY CENTER | Age: 49
End: 2024-10-02
Payer: COMMERCIAL

## 2024-10-02 VITALS
HEIGHT: 60 IN | DIASTOLIC BLOOD PRESSURE: 68 MMHG | OXYGEN SATURATION: 98 % | BODY MASS INDEX: 28.27 KG/M2 | HEART RATE: 86 BPM | RESPIRATION RATE: 18 BRPM | WEIGHT: 144 LBS | SYSTOLIC BLOOD PRESSURE: 122 MMHG

## 2024-10-02 DIAGNOSIS — R31.0 GROSS HEMATURIA: ICD-10-CM

## 2024-10-02 DIAGNOSIS — N20.0 CALCULUS OF KIDNEY: ICD-10-CM

## 2024-10-02 DIAGNOSIS — R39.9 UTI SYMPTOMS: Primary | ICD-10-CM

## 2024-10-02 LAB
SL AMB  POCT GLUCOSE, UA: NORMAL
SL AMB LEUKOCYTE ESTERASE,UA: NORMAL
SL AMB POCT BILIRUBIN,UA: NORMAL
SL AMB POCT BLOOD,UA: NORMAL
SL AMB POCT CLARITY,UA: CLEAR
SL AMB POCT COLOR,UA: YELLOW
SL AMB POCT KETONES,UA: NORMAL
SL AMB POCT NITRITE,UA: NORMAL
SL AMB POCT PH,UA: 6
SL AMB POCT SPECIFIC GRAVITY,UA: 1.02
SL AMB POCT URINE PROTEIN: NORMAL
SL AMB POCT UROBILINOGEN: 0.2

## 2024-10-02 PROCEDURE — 99204 OFFICE O/P NEW MOD 45 MIN: CPT | Performed by: UROLOGY

## 2024-10-02 PROCEDURE — 81002 URINALYSIS NONAUTO W/O SCOPE: CPT | Performed by: UROLOGY

## 2024-10-02 NOTE — PROGRESS NOTES
Ambulatory Visit  Name: Karly Howell      : 1975      MRN: [id number]  Encounter Provider: Morgan Fernandes MD  Encounter Date: 10/2/2024   Encounter department: [facility] UROLOGY [place]    Assessment & Plan  UTI symptoms    Orders:    POCT urine dip    Urinalysis with microscopic; Future    Urine culture; Future    Urinalysis with microscopic    Urine culture    Calculus of kidney    Orders:    CT renal stone study abdomen pelvis wo contrast; Future    Urinalysis with microscopic; Future    Urine culture; Future    Urinalysis with microscopic    Urine culture  Impression: E. coli UTI, bilateral nonobstructing nephrolithiasis up to 5 mm, possible hematuria, right hydronephrosis    Plan: I recommend a CT renal protocol.  This will evaluate not only for her nonobstructing stones but also her recently identified right-sided hydronephrosis on ultrasound.  I have also ordered a urinalysis and urine culture if she believes she has another infection.  We discussed possible cystoscopy but the patient will consider this when she returns in follow-up to review the results of the CT scan with the advanced practitioner.    History of Present Illness     Karly Howell is a 49 y.o. female who presents for evaluation of recurrent UTI.  There is a single positive urine culture from 2024 which reveals pansensitive E. coli.  He had a recent renal and bladder ultrasound which shows mild right-sided hydronephrosis with a right ureteral jet present.  Bilateral nonobstructing stones measuring up to 5 mm were appreciated.  She is unsure if she saw hematuria when she had a UTI.  She was treated with antibiotics and her symptoms have resolved.      Review of Systems          Objective     /68 (BP Location: Left arm, Patient Position: Sitting, Cuff Size: Adult)   Pulse 86   Resp 18   Ht 5' (1.524 m)   Wt 65.3 kg (144 lb)   LMP 2024 (Approximate)   SpO2 98%   BMI 28.12 kg/m²   Physical  Assumed patient care this am. Patient alert, oriented x4. Neurologically intact. See flowsheet for complete assessment. Zofran given for nausea x1 this am. Tylenol given for headache. Up to bathroom and ambulating in flores with assist x1, tolerating well. "Exam on examination she is in no acute distress.  Her abdomen is soft nontender nondistended.   examination reveals no CVA tenderness.  Skin is warm.  Extremities without edema.  Neurologic is grossly intact and nonfocal.  Gait normal.  Affect normal.    Results  No results found for: \"PSA\"  Lab Results   Component Value Date    GLUCOSE 92 05/16/2017    CALCIUM 8.4 05/19/2024     07/18/2016    K 4.7 06/20/2024    CO2 24 06/20/2024     06/20/2024    BUN 14 06/20/2024    CREATININE 0.65 06/20/2024     Lab Results   Component Value Date    WBC 8.44 05/19/2024    HGB 12.9 05/19/2024    HCT 38.4 05/19/2024    MCV 94 05/19/2024     05/19/2024       Office Urine Dip  No results found for this or any previous visit (from the past 1 hour(s)).]    Administrative Statements         "

## 2024-10-02 NOTE — ASSESSMENT & PLAN NOTE
Orders:    CT renal stone study abdomen pelvis wo contrast; Future    Urinalysis with microscopic; Future    Urine culture; Future    Urinalysis with microscopic    Urine culture  Impression: E. coli UTI, bilateral nonobstructing nephrolithiasis up to 5 mm, possible hematuria, right hydronephrosis    Plan: I recommend a CT renal protocol.  This will evaluate not only for her nonobstructing stones but also her recently identified right-sided hydronephrosis on ultrasound.  I have also ordered a urinalysis and urine culture if she believes she has another infection.  We discussed possible cystoscopy but the patient will consider this when she returns in follow-up to review the results of the CT scan with the advanced practitioner.

## 2024-10-02 NOTE — LETTER
2024     Frank Lombardi, DO  [address]  [address]  [facility] [address]    Patient: Karly Howell   YOB: 1975   Date of Visit: 10/2/2024       Dear Dr. Lombardi:    Thank you for referring Karly Howell to me for evaluation. Below are my notes for this consultation.    If you have questions, please do not hesitate to call me. I look forward to following your patient along with you.         Sincerely,        Morgan Fernandes MD        CC: No Recipients    Morgan Fernandes MD  10/2/2024 11:32 AM  Sign when Signing Visit  Ambulatory Visit  Name: Karly Howell      : 1975      MRN: [id number]  Encounter Provider: Morgan Fernandes MD  Encounter Date: 10/2/2024   Encounter department: [facility] UROLOGY [place]    Assessment & Plan  UTI symptoms    Orders:  •  POCT urine dip  •  Urinalysis with microscopic; Future  •  Urine culture; Future  •  Urinalysis with microscopic  •  Urine culture    Calculus of kidney    Orders:  •  CT renal stone study abdomen pelvis wo contrast; Future  •  Urinalysis with microscopic; Future  •  Urine culture; Future  •  Urinalysis with microscopic  •  Urine culture  Impression: E. coli UTI, bilateral nonobstructing nephrolithiasis up to 5 mm, possible hematuria, right hydronephrosis    Plan: I recommend a CT renal protocol.  This will evaluate not only for her nonobstructing stones but also her recently identified right-sided hydronephrosis on ultrasound.  I have also ordered a urinalysis and urine culture if she believes she has another infection.  We discussed possible cystoscopy but the patient will consider this when she returns in follow-up to review the results of the CT scan with the advanced practitioner.    History of Present Illness    Karly Howell is a 49 y.o. female who presents for evaluation of recurrent UTI.  There is a single positive urine culture from 2024 which reveals pansensitive E. coli.  He had a  "recent renal and bladder ultrasound which shows mild right-sided hydronephrosis with a right ureteral jet present.  Bilateral nonobstructing stones measuring up to 5 mm were appreciated.  She is unsure if she saw hematuria when she had a UTI.  She was treated with antibiotics and her symptoms have resolved.      Review of Systems          Objective    /68 (BP Location: Left arm, Patient Position: Sitting, Cuff Size: Adult)   Pulse 86   Resp 18   Ht 5' (1.524 m)   Wt 65.3 kg (144 lb)   LMP 07/06/2024 (Approximate)   SpO2 98%   BMI 28.12 kg/m²   Physical Exam on examination she is in no acute distress.  Her abdomen is soft nontender nondistended.   examination reveals no CVA tenderness.  Skin is warm.  Extremities without edema.  Neurologic is grossly intact and nonfocal.  Gait normal.  Affect normal.    Results  No results found for: \"PSA\"  Lab Results   Component Value Date    GLUCOSE 92 05/16/2017    CALCIUM 8.4 05/19/2024     07/18/2016    K 4.7 06/20/2024    CO2 24 06/20/2024     06/20/2024    BUN 14 06/20/2024    CREATININE 0.65 06/20/2024     Lab Results   Component Value Date    WBC 8.44 05/19/2024    HGB 12.9 05/19/2024    HCT 38.4 05/19/2024    MCV 94 05/19/2024     05/19/2024       Office Urine Dip  No results found for this or any previous visit (from the past 1 hour(s)).]    Administrative Statements        "

## 2024-10-13 LAB
BUN SERPL-MCNC: 15 MG/DL (ref 6–24)
BUN/CREAT SERPL: 19 (ref 9–23)
CALCIUM SERPL-MCNC: 9.1 MG/DL (ref 8.7–10.2)
CHLORIDE SERPL-SCNC: 104 MMOL/L (ref 96–106)
CO2 SERPL-SCNC: 26 MMOL/L (ref 20–29)
CREAT SERPL-MCNC: 0.77 MG/DL (ref 0.57–1)
EGFR: 95 ML/MIN/1.73
GLUCOSE SERPL-MCNC: 95 MG/DL (ref 70–99)
POTASSIUM SERPL-SCNC: 4.6 MMOL/L (ref 3.5–5.2)
SODIUM SERPL-SCNC: 142 MMOL/L (ref 134–144)

## 2024-10-14 LAB
ENDOMYSIUM IGA SER QL: NEGATIVE
IGA SERPL-MCNC: 267 MG/DL (ref 87–352)
TTG IGA SER-ACNC: <2 U/ML (ref 0–3)

## 2024-10-17 ENCOUNTER — HOSPITAL ENCOUNTER (OUTPATIENT)
Dept: RADIOLOGY | Facility: HOSPITAL | Age: 49
Discharge: HOME | End: 2024-10-17
Attending: UROLOGY
Payer: COMMERCIAL

## 2024-10-17 DIAGNOSIS — R31.0 GROSS HEMATURIA: ICD-10-CM

## 2024-10-17 PROCEDURE — 74178 CT ABD&PLV WO CNTR FLWD CNTR: CPT

## 2024-10-17 RX ADMIN — IOHEXOL 120 ML: 350 INJECTION, SOLUTION INTRAVENOUS at 10:16

## 2024-10-30 ENCOUNTER — OFFICE VISIT (OUTPATIENT)
Dept: GASTROENTEROLOGY | Facility: CLINIC | Age: 49
End: 2024-10-30
Payer: COMMERCIAL

## 2024-10-30 VITALS
HEIGHT: 60 IN | DIASTOLIC BLOOD PRESSURE: 84 MMHG | HEART RATE: 98 BPM | BODY MASS INDEX: 27.68 KG/M2 | SYSTOLIC BLOOD PRESSURE: 128 MMHG | WEIGHT: 141 LBS | OXYGEN SATURATION: 98 %

## 2024-10-30 DIAGNOSIS — R10.84 GENERALIZED ABDOMINAL PAIN: Primary | ICD-10-CM

## 2024-10-30 PROBLEM — L56.8 PHOTOSENSITIVITY: Status: ACTIVE | Noted: 2024-10-30

## 2024-10-30 PROBLEM — Z86.0100 HISTORY OF COLON POLYPS: Status: ACTIVE | Noted: 2024-10-30

## 2024-10-30 PROBLEM — R76.0 ANTI-CARDIOLIPIN ANTIBODY POSITIVE: Status: ACTIVE | Noted: 2024-10-30

## 2024-10-30 PROBLEM — R76.8 POSITIVE ANA (ANTINUCLEAR ANTIBODY): Status: ACTIVE | Noted: 2024-10-30

## 2024-10-30 PROCEDURE — 99214 OFFICE O/P EST MOD 30 MIN: CPT | Performed by: INTERNAL MEDICINE

## 2024-10-30 RX ORDER — ROSUVASTATIN CALCIUM 20 MG/1
20 TABLET, COATED ORAL DAILY
COMMUNITY

## 2024-10-30 NOTE — ASSESSMENT & PLAN NOTE
Patient reports having nonspecific symptoms of pain with bloating at times.  She is concerned about celiac disease even the celiac disease panel came back negative and also the duodenal biopsies were negative for celiac in the past.  Symptoms appear to be most likely functional secondary to IBS    -Explained to patient in detail about different possible etiologies and given reassurance.  Discussed about the pathophysiology of IBS    -Advised about low FODMAP diet    -I also advised her to stay strict on gluten-free diet for few months to see the response.  She can even try to avoid milk and dairy products.    -Discussed about antispasmodics like Bentyl but she does not want to take any medication at this time.    -If she continues to have any frequent symptoms we can consider SIBO testing

## 2024-10-30 NOTE — PROGRESS NOTES
Follow-up Note -  [facility]  Karly Howell 1975 female         ASSESSMENT & PLAN:    Generalized abdominal pain  Patient reports having nonspecific symptoms of pain with bloating at times.  She is concerned about celiac disease even the celiac disease panel came back negative and also the duodenal biopsies were negative for celiac in the past.  Symptoms appear to be most likely functional secondary to IBS    -Explained to patient in detail about different possible etiologies and given reassurance.  Discussed about the pathophysiology of IBS    -Advised about low FODMAP diet    -I also advised her to stay strict on gluten-free diet for few months to see the response.  She can even try to avoid milk and dairy products.    -Discussed about antispasmodics like Bentyl but she does not want to take any medication at this time.    -If she continues to have any frequent symptoms we can consider SIBO testing      Reason: Follow-up    HPI:  Ms. Brandt came in for regular follow-up.  I did EGD and colonoscopies on her last month and removed a colon polyp.  Gastric biopsies are benign.  She had celiac disease panel which came back as negative.  She had a CT renal protocol which was unremarkable. She reports having episodes of cramping pain and occasional diarrhea.  She is very anxious about the tubular adenoma that was removed which is only about 5 to 7 mm.  Denies any heartburn or acid reflux.  Denies significant swelling.  Good appetite, no recent weight loss.    Chaperon: Ms. Middleton    REVIEW OF SYSTEMS: Review of Systems   Constitutional:  Negative for activity change, appetite change, chills, diaphoresis, fatigue, fever and unexpected weight change.   HENT:  Negative for ear discharge, ear pain, facial swelling, hearing loss, nosebleeds, sore throat, tinnitus and voice change.    Eyes:  Negative for pain, discharge, redness, itching and visual disturbance.   Respiratory:  Negative for apnea,  cough, chest tightness, shortness of breath and wheezing.    Cardiovascular:  Negative for chest pain and palpitations.   Gastrointestinal:         As noted in HPI   Endocrine: Negative for cold intolerance, heat intolerance and polyuria.   Genitourinary:  Negative for difficulty urinating, dysuria, flank pain, hematuria and urgency.   Musculoskeletal:  Negative for arthralgias, back pain, gait problem, joint swelling and myalgias.   Skin:  Negative for rash and wound.   Neurological:  Negative for dizziness, tremors, seizures, speech difficulty, light-headedness, numbness and headaches.   Hematological:  Negative for adenopathy. Does not bruise/bleed easily.   Psychiatric/Behavioral:  Negative for agitation, behavioral problems and confusion. The patient is not nervous/anxious.         Past Medical History:   Diagnosis Date    Cervical radiculopathy     RESOLVED: [id number] buldging disc    GERD (gastroesophageal reflux disease)     Hyperlipidemia     Kidney stone     Migraines     Seizure (HCC)     age 24      Past Surgical History:   Procedure Laterality Date    ADENOIDECTOMY      COLONOSCOPY  2024    EGD  2024    EGD AND COLONOSCOPY  2019    LITHOTRIPSY      RENAL    OVARIAN CYST REMOVAL      PA HYSTEROSCOPY BX ENDOMETRIUM&/POLYPC W/WO D&C N/A 10/28/2019    Procedure: OPERATIVE HYSTEROSCOPY (MYOSURE),POLYPECTOMY,D AND C;  Surgeon: Roel Rothman MD;  Location: [facility];  Service: Gynecology     Social History     Socioeconomic History    Marital status: /Civil Union     Spouse name: Not on file    Number of children: Not on file    Years of education: Not on file    Highest education level: Not on file   Occupational History    Not on file   Tobacco Use    Smoking status: Former     Current packs/day: 0.00     Average packs/day: 1 pack/day for 20.0 years (20.0 ttl pk-yrs)     Types: Cigarettes     Start date: 6/15/1992     Quit date: 2007     Years since quittin.4      Passive exposure: Never    Smokeless tobacco: Never   Vaping Use    Vaping status: Never Used   Substance and Sexual Activity    Alcohol use: Not Currently     Comment: rare    Drug use: Not Currently     Types: Marijuana     Comment: gummy    Sexual activity: Not on file   Other Topics Concern    Not on file   Social History Narrative    Daily coffee consumption    Daily tea consumption     as per all scripts    Lack of exercise    Sleeps 6-7 hours a day      Social Determinants of Health     Financial Resource Strain: Not on file   Food Insecurity: Not on file   Transportation Needs: Not on file   Physical Activity: Not on file   Stress: Not on file   Social Connections: Not on file   Intimate Partner Violence: Not on file   Housing Stability: Not on file     Family History   Problem Relation Age of Onset    Multiple sclerosis Mother     Colon cancer Maternal Grandmother 70    Rheum arthritis Maternal Grandmother     Arthritis Family     Colon cancer Family     Osteoporosis Family     Anxiety disorder Father     Chromosomal disorder Cousin     No Known Problems Sister     No Known Problems Daughter     Skin cancer Maternal Grandfather 65        not sure of exact age.    No Known Problems Paternal Grandmother     No Known Problems Paternal Grandfather     No Known Problems Daughter     No Known Problems Son     No Known Problems Maternal Aunt     No Known Problems Maternal Aunt     No Known Problems Paternal Aunt      Ciprofloxacin and Phenytoin  Current Outpatient Medications   Medication Sig Dispense Refill    Aimovig 140 MG/ML SOAJ ADMINISTER 1 ML UNDER THE SKIN EVERY 4 WEEKS      carBAMazepine (CARBATROL) 300 MG 12 hr capsule 300 mg every 12 (twelve) hours   1    Casanthranol-Docusate Sodium (STOOL SOFTENER PLUS PO) Take by mouth Rarely takes as needed      rosuvastatin (CRESTOR) 20 MG tablet Take 20 mg by mouth daily      SUMAtriptan (IMITREX) 100 mg tablet Take 1 tablet (100 mg total) by mouth once as  needed for migraine for up to 60 doses 30 tablet 1    valACYclovir (VALTREX) 500 mg tablet 500 mg daily As needed  2     No current facility-administered medications for this visit.       Blood pressure 128/84, pulse 98, height 5' (1.524 m), weight 64 kg (141 lb), SpO2 98%, not currently breastfeeding.    PHYSICAL EXAM: Physical Exam  Constitutional:       Appearance: Normal appearance. She is well-developed.   HENT:      Head: Normocephalic and atraumatic.      Nose: Nose normal.   Eyes:      Conjunctiva/sclera: Conjunctivae normal.   Neck:      Thyroid: No thyromegaly.      Vascular: No JVD.      Trachea: No tracheal deviation.   Cardiovascular:      Rate and Rhythm: Normal rate and regular rhythm.      Heart sounds: Normal heart sounds. No murmur heard.     No friction rub. No gallop.   Pulmonary:      Effort: Pulmonary effort is normal. No respiratory distress.      Breath sounds: Normal breath sounds. No wheezing or rales.   Abdominal:      General: Bowel sounds are normal. There is no distension.      Palpations: Abdomen is soft. There is no mass.      Tenderness: There is no abdominal tenderness. There is no guarding.      Hernia: No hernia is present.   Musculoskeletal:         General: No tenderness or deformity.      Cervical back: Neck supple.      Right lower leg: No edema.      Left lower leg: No edema.   Lymphadenopathy:      Cervical: No cervical adenopathy.   Skin:     General: Skin is warm and dry.      Findings: No erythema or rash.   Neurological:      Mental Status: She is alert and oriented to person, place, and time.   Psychiatric:         Mood and Affect: Mood normal.         Behavior: Behavior normal.         Thought Content: Thought content normal.          Lab Results   Component Value Date    WBC 8.44 05/19/2024    HGB 12.9 05/19/2024    HCT 38.4 05/19/2024    MCV 94 05/19/2024     05/19/2024     Lab Results   Component Value Date    GLUCOSE 92 05/16/2017    CALCIUM 8.4 05/19/2024      07/18/2016    K 4.6 10/12/2024    CO2 26 10/12/2024     10/12/2024    BUN 15 10/12/2024    CREATININE 0.77 10/12/2024     Lab Results   Component Value Date    ALT 13 06/20/2024    AST 17 06/20/2024    ALKPHOS 65 05/19/2024    BILITOT 0.2 07/18/2016     Lab Results   Component Value Date    INR 1.06 04/06/2014    PROTIME 13.3 04/06/2014       CT renal protocol    Result Date: 10/23/2024  Impression: 1. Nonobstructing bilateral intrarenal calculi. 2. Mild nonspecific central ventral bladder wall thickening could reflect underlying edema/inflammation. Correlate with urinalysis. 3. Additional findings as noted. Workstation performed: [id number]

## 2024-10-31 ENCOUNTER — CONSULT (OUTPATIENT)
Dept: RHEUMATOLOGY | Facility: CLINIC | Age: 49
End: 2024-10-31
Payer: COMMERCIAL

## 2024-10-31 VITALS
SYSTOLIC BLOOD PRESSURE: 122 MMHG | WEIGHT: 140.6 LBS | HEIGHT: 60 IN | DIASTOLIC BLOOD PRESSURE: 80 MMHG | OXYGEN SATURATION: 100 % | HEART RATE: 89 BPM | BODY MASS INDEX: 27.61 KG/M2

## 2024-10-31 DIAGNOSIS — R76.8 POSITIVE ANA (ANTINUCLEAR ANTIBODY): Primary | ICD-10-CM

## 2024-10-31 DIAGNOSIS — R21 SKIN RASH: ICD-10-CM

## 2024-10-31 DIAGNOSIS — M79.671 BILATERAL FOOT PAIN: ICD-10-CM

## 2024-10-31 DIAGNOSIS — L56.8 PHOTOSENSITIVITY: ICD-10-CM

## 2024-10-31 DIAGNOSIS — R76.0 ANTI-CARDIOLIPIN ANTIBODY POSITIVE: ICD-10-CM

## 2024-10-31 DIAGNOSIS — H04.123 CHRONIC DRYNESS OF BOTH EYES: ICD-10-CM

## 2024-10-31 DIAGNOSIS — M79.672 BILATERAL FOOT PAIN: ICD-10-CM

## 2024-10-31 PROCEDURE — 99205 OFFICE O/P NEW HI 60 MIN: CPT | Performed by: INTERNAL MEDICINE

## 2024-10-31 NOTE — ASSESSMENT & PLAN NOTE
Ms. Howell is a 49-year-old female with no significant past medical history who presents for an evaluation of a positive FEMI 1:80 speckled pattern, anticardiolipin IgM antibody of 27 and skin rash.  She is referred by Dr. Ruiz for a rheumatology consult.    - Karly presents today for an evaluation of a positive FEMI and anticardiolipin IgM antibody that were detected to evaluate photosensitive and possibly sunscreen associated skin rashes she has been experiencing since last summer.  This has predominantly affected her face in a malar distribution and has been more frequent recently, with prior occurrences also involving her neck, chest, upper arms as well as an isolated self resolving episode of skin rashes/erythema overlying her joints.  Her review of systems is also pertinent for chronic dry eyes as well as bilateral forefoot pain over the past few months.  Her physical examination today is unrevealing and there are no cutaneous manifestations identified.  Given this presentation it may raise suspicion for an underlying autoimmune disease and I would consider possibilities such as a lupus related condition or cutaneous dermatomyositis.  We discussed non-rheumatic causes may include solar related skin conditions versus possibly an allergic response.  To further evaluate I would like to complete additional autoimmune testing and consider a skin biopsy moving forwards as this may assist in identifying an etiology.  She will contact the office when a skin rash is present and I will try to have her establish with the rheumatology dermatology clinic.  She can also contact her dermatologist in [place] for an immediate appointment.  If there are abnormalities detected on the additional blood work then this will also be reviewed.    - For now her symptoms will be monitored and managed conservatively.  I am hoping with the planned investigations we are able to determine a cause for her symptoms.      Orders:    C3  complement; Future    C4 complement; Future    Beta-2 glycoprotein antibodies; Future    Cardiolipin antibody; Future    Lupus anticoagulant; Future    Urinalysis with microscopic; Future    Protein / creatinine ratio, urine; Future    Anti-neutrophilic cytoplasmic antibody; Future    Anti-Odilia 1 Antibody; Future    Histone Antibody; Future    CK; Future    Cryoglobulin; Future    Ferritin; Future    Thyroid Antibodies Panel; Future    Ambulatory Referral to Rheumatology    MyoMarker 3 Plus Profile (RDL); Future    C3 complement    C4 complement    Beta-2 glycoprotein antibodies    Cardiolipin antibody    Lupus anticoagulant    Urinalysis with microscopic    Protein / creatinine ratio, urine    Anti-neutrophilic cytoplasmic antibody    Anti-Odilia 1 Antibody    Histone Antibody    CK    Cryoglobulin    Ferritin    Thyroid Antibodies Panel    MyoMarker 3 Plus Profile (RDL)

## 2024-10-31 NOTE — PROGRESS NOTES
Ambulatory Visit  Name: Kalry Howell      : 1975      MRN: [id number]  Encounter Provider: Mary Matthews MD  Encounter Date: 10/31/2024   Encounter department: [facility] [place]    Assessment & Plan  Positive FEMI (antinuclear antibody)  Ms. Howell is a 49-year-old female with no significant past medical history who presents for an evaluation of a positive FEMI 1:80 speckled pattern, anticardiolipin IgM antibody of 27 and skin rash.  She is referred by Dr. Ruiz for a rheumatology consult.    - Karly presents today for an evaluation of a positive FEMI and anticardiolipin IgM antibody that were detected to evaluate photosensitive and possibly sunscreen associated skin rashes she has been experiencing since last summer.  This has predominantly affected her face in a malar distribution and has been more frequent recently, with prior occurrences also involving her neck, chest, upper arms as well as an isolated self resolving episode of skin rashes/erythema overlying her joints.  Her review of systems is also pertinent for chronic dry eyes as well as bilateral forefoot pain over the past few months.  Her physical examination today is unrevealing and there are no cutaneous manifestations identified.  Given this presentation it may raise suspicion for an underlying autoimmune disease and I would consider possibilities such as a lupus related condition or cutaneous dermatomyositis.  We discussed non-rheumatic causes may include solar related skin conditions versus possibly an allergic response.  To further evaluate I would like to complete additional autoimmune testing and consider a skin biopsy moving forwards as this may assist in identifying an etiology.  She will contact the office when a skin rash is present and I will try to have her establish with the rheumatology dermatology clinic.  She can also contact her dermatologist in [place] for an immediate appointment.  If there are  abnormalities detected on the additional blood work then this will also be reviewed.    - For now her symptoms will be monitored and managed conservatively.  I am hoping with the planned investigations we are able to determine a cause for her symptoms.      Orders:    C3 complement; Future    C4 complement; Future    Beta-2 glycoprotein antibodies; Future    Cardiolipin antibody; Future    Lupus anticoagulant; Future    Urinalysis with microscopic; Future    Protein / creatinine ratio, urine; Future    Anti-neutrophilic cytoplasmic antibody; Future    Anti-Odilia 1 Antibody; Future    Histone Antibody; Future    CK; Future    Cryoglobulin; Future    Ferritin; Future    Thyroid Antibodies Panel; Future    Ambulatory Referral to Rheumatology    MyoMarker 3 Plus Profile (RDL); Future    C3 complement    C4 complement    Beta-2 glycoprotein antibodies    Cardiolipin antibody    Lupus anticoagulant    Urinalysis with microscopic    Protein / creatinine ratio, urine    Anti-neutrophilic cytoplasmic antibody    Anti-Odilia 1 Antibody    Histone Antibody    CK    Cryoglobulin    Ferritin    Thyroid Antibodies Panel    MyoMarker 3 Plus Profile (RDL)    Anti-cardiolipin antibody positive    Orders:    C3 complement; Future    C4 complement; Future    Beta-2 glycoprotein antibodies; Future    Cardiolipin antibody; Future    Lupus anticoagulant; Future    Urinalysis with microscopic; Future    Protein / creatinine ratio, urine; Future    Anti-neutrophilic cytoplasmic antibody; Future    Anti-Odilia 1 Antibody; Future    Histone Antibody; Future    CK; Future    Cryoglobulin; Future    Ferritin; Future    Thyroid Antibodies Panel; Future    MyoMarker 3 Plus Profile (RDL); Future    C3 complement    C4 complement    Beta-2 glycoprotein antibodies    Cardiolipin antibody    Lupus anticoagulant    Urinalysis with microscopic    Protein / creatinine ratio, urine    Anti-neutrophilic cytoplasmic antibody    Anti-Odilia 1 Antibody    Histone  Antibody    CK    Cryoglobulin    Ferritin    Thyroid Antibodies Panel    MyoMarker 3 Plus Profile (RDL)    Photosensitivity    Orders:    C3 complement; Future    C4 complement; Future    Beta-2 glycoprotein antibodies; Future    Cardiolipin antibody; Future    Lupus anticoagulant; Future    Urinalysis with microscopic; Future    Protein / creatinine ratio, urine; Future    Anti-neutrophilic cytoplasmic antibody; Future    Anti-Odilia 1 Antibody; Future    Histone Antibody; Future    CK; Future    Cryoglobulin; Future    Ferritin; Future    Thyroid Antibodies Panel; Future    MyoMarker 3 Plus Profile (RDL); Future    C3 complement    C4 complement    Beta-2 glycoprotein antibodies    Cardiolipin antibody    Lupus anticoagulant    Urinalysis with microscopic    Protein / creatinine ratio, urine    Anti-neutrophilic cytoplasmic antibody    Anti-Odilia 1 Antibody    Histone Antibody    CK    Cryoglobulin    Ferritin    Thyroid Antibodies Panel    MyoMarker 3 Plus Profile (RDL)    Skin rash    Orders:    C3 complement; Future    C4 complement; Future    Beta-2 glycoprotein antibodies; Future    Cardiolipin antibody; Future    Lupus anticoagulant; Future    Urinalysis with microscopic; Future    Protein / creatinine ratio, urine; Future    Anti-neutrophilic cytoplasmic antibody; Future    Anti-Odilia 1 Antibody; Future    Histone Antibody; Future    CK; Future    Cryoglobulin; Future    Ferritin; Future    Thyroid Antibodies Panel; Future    MyoMarker 3 Plus Profile (RDL); Future    C3 complement    C4 complement    Beta-2 glycoprotein antibodies    Cardiolipin antibody    Lupus anticoagulant    Urinalysis with microscopic    Protein / creatinine ratio, urine    Anti-neutrophilic cytoplasmic antibody    Anti-Odilia 1 Antibody    Histone Antibody    CK    Cryoglobulin    Ferritin    Thyroid Antibodies Panel    MyoMarker 3 Plus Profile (RDL)    Bilateral foot pain         Chronic dryness of both eyes           I have spent a total time  of 60 minutes in caring for this patient on the day of the visit/encounter including Diagnostic results, Instructions for management, Patient and family education, Impressions, Documenting in the medical record, Reviewing / ordering tests, medicine, procedures  , and Obtaining or reviewing history  .      History of Present Illness     Ms. Howell is a 49-year-old female with no significant past medical history who presents for an evaluation of a positive FEMI 1:80 speckled pattern, anticardiolipin IgM antibody of 27 and skin rash.  She is referred by Dr. Ruiz for a rheumatology consult.    Patient reports in the summer of 2023 she started to appreciate a raised, red and itchy skin rash appearing like pimples on her face in a malar distribution.  She initially thought this might be a reaction to wearing sunscreen as the rash was only occurring with application of sunscreen and sun exposure.  The first time this occurred she was at [place] with prolonged sun exposure.  She was seen at the urgent care and prescribed a course of oral steroids which did help to clear up the rash.  Following this she tried to minimize sun exposure and stopped using sunscreen.  She reports even with using moisturizers that contain SPF the same reaction would occur.  She is now trying to use a zinc only sunscreen but states that she reacts negatively to this as well.  She reports the rash on her face has been occurring more frequently recently and is present multiple times in a week.  It does resolve spontaneously within 1 to 2 days.  She is not using any medications for this.  She does have a steroid cream at home but has not utilized it.  She mentions other than the rash on her face she also noticed redness occurring on her chest, neck and upper arms also with the use of sunscreen and sun exposure.  With discontinuation of sunscreen use and being careful with sun exposure she has not noticed this type of rash to recur recently.  In  July 2024 she also noticed a rash overlying her joints such as on her elbows and ankles which resolved spontaneously within 1 week and has not recurred.  She was unable to identify a trigger.  In view of the ongoing skin rashes she was seen by dermatology in [place] and advised to see a rheumatologist.  She was prescribed a steroid cream which did not really help.  She was also advised to return for an office visit to obtain a skin biopsy when the rash recurs.    She was seen by her primary care physician at the request of dermatology and had testing done which showed the positive FEMI and anticardiolipin IgM antibody.  An FEMI specificity, ESR, CRP, rheumatoid factor, anti-CCP antibody and celiac disease antibody profile were unremarkable.    She reports chronic dry eyes.  She has previously used an over-the-counter lubricating gel but as it was too thick she discontinued use.  She is not using any lubricating agents currently.  She also appreciates occasional dry mouth but this is not a significant symptom to her.  For the past few months she has appreciated pain affecting her bilateral feet which is present fairly constantly and mostly in the location of her forefoot.  No other concerning joint pains.  She denies swollen or particularly stiff joints.  She denies fevers, unintentional weight loss, focal alopecia, inflammatory eye disease, psoriasis, mouth/nose ulcers, swollen glands, pleuritic chest pain, inflammatory bowel disease, blood clots, miscarriages, Raynaud's or muscle weakness.  She reports a history of Crohn's disease in a cousin, her aunt possibly has celiac disease and her grandmother has rheumatoid arthritis.  There is also family history of multiple sclerosis.        Review of Systems  Constitutional: Negative for weight change, fevers, chills, night sweats, fatigue.  ENT/Mouth: Negative for hearing changes, ear pain, nasal congestion, sinus pain, hoarseness, sore throat, rhinorrhea, swallowing  difficulty.   Eyes: Negative for pain, redness, discharge, vision changes.   Cardiovascular: Negative for chest pain, SOB, palpitations.   Respiratory: Negative for cough, sputum, wheezing, dyspnea.   Gastrointestinal: Negative for nausea, vomiting, diarrhea, constipation, pain, heartburn.  Genitourinary: Negative for dysuria, urinary frequency, hematuria.   Musculoskeletal: As per HPI.  Skin: Negative for color changes.  Positive for skin rash.   Neuro: Negative for weakness, numbness, tingling, loss of consciousness.   Psych: Negative for anxiety, depression.   Heme/Lymph: Negative for easy bruising, bleeding, lymphadenopathy.      Past Medical History   Past Medical History:   Diagnosis Date    Cervical radiculopathy     RESOLVED: [id number] buldging disc    GERD (gastroesophageal reflux disease)     Hyperlipidemia     Kidney stone     Migraines     Seizure (HCC)     age 24     Past Surgical History:   Procedure Laterality Date    ADENOIDECTOMY      COLONOSCOPY  09/26/2024    EGD  09/26/2024    EGD AND COLONOSCOPY  09/18/2019    LITHOTRIPSY  2006    RENAL    OVARIAN CYST REMOVAL  2012    AR HYSTEROSCOPY BX ENDOMETRIUM&/POLYPC W/WO D&C N/A 10/28/2019    Procedure: OPERATIVE HYSTEROSCOPY (MYOSURE),POLYPECTOMY,D AND C;  Surgeon: Roel Rothman MD;  Location: [facility];  Service: Gynecology     Family History   Problem Relation Age of Onset    Multiple sclerosis Mother     Colon cancer Maternal Grandmother 70    Rheum arthritis Maternal Grandmother     Arthritis Family     Colon cancer Family     Osteoporosis Family     Anxiety disorder Father     Chromosomal disorder Cousin     No Known Problems Sister     No Known Problems Daughter     Skin cancer Maternal Grandfather 65        not sure of exact age.    No Known Problems Paternal Grandmother     No Known Problems Paternal Grandfather     No Known Problems Daughter     No Known Problems Son     No Known Problems Maternal Aunt     No Known Problems Maternal Aunt      No Known Problems Paternal Aunt      Current Outpatient Medications on File Prior to Visit   Medication Sig Dispense Refill    Aimovig 140 MG/ML SOAJ ADMINISTER 1 ML UNDER THE SKIN EVERY 4 WEEKS      carBAMazepine (CARBATROL) 300 MG 12 hr capsule 300 mg every 12 (twelve) hours   1    Casanthranol-Docusate Sodium (STOOL SOFTENER PLUS PO) Take by mouth Rarely takes as needed      rosuvastatin (CRESTOR) 20 MG tablet Take 20 mg by mouth daily      SUMAtriptan (IMITREX) 100 mg tablet Take 1 tablet (100 mg total) by mouth once as needed for migraine for up to 60 doses 30 tablet 1    valACYclovir (VALTREX) 500 mg tablet 500 mg daily As needed  2     No current facility-administered medications on file prior to visit.     Allergies   Allergen Reactions    Ciprofloxacin Lightheadedness     dizzy    Phenytoin Rash     Reaction Date: 2010;       Current Outpatient Medications on File Prior to Visit   Medication Sig Dispense Refill    Aimovig 140 MG/ML SOAJ ADMINISTER 1 ML UNDER THE SKIN EVERY 4 WEEKS      carBAMazepine (CARBATROL) 300 MG 12 hr capsule 300 mg every 12 (twelve) hours   1    Casanthranol-Docusate Sodium (STOOL SOFTENER PLUS PO) Take by mouth Rarely takes as needed      rosuvastatin (CRESTOR) 20 MG tablet Take 20 mg by mouth daily      SUMAtriptan (IMITREX) 100 mg tablet Take 1 tablet (100 mg total) by mouth once as needed for migraine for up to 60 doses 30 tablet 1    valACYclovir (VALTREX) 500 mg tablet 500 mg daily As needed  2     No current facility-administered medications on file prior to visit.      Social History     Tobacco Use    Smoking status: Former     Current packs/day: 0.00     Average packs/day: 1 pack/day for 20.0 years (20.0 ttl pk-yrs)     Types: Cigarettes     Start date: 6/15/1992     Quit date: 2007     Years since quittin.5     Passive exposure: Never    Smokeless tobacco: Never   Vaping Use    Vaping status: Never Used   Substance and Sexual Activity    Alcohol use: Not  Currently     Comment: rare    Drug use: Not Currently     Types: Marijuana     Comment: gummy    Sexual activity: Not on file         Objective     /80 (BP Location: Left arm, Patient Position: Sitting, Cuff Size: Adult)   Pulse 89   Ht 5' (1.524 m)   Wt 63.8 kg (140 lb 9.6 oz)   SpO2 100%   BMI 27.46 kg/m²     Physical Exam  General: Well appearing, well nourished, in no distress. Oriented x 3, normal mood and affect.  Ambulating without difficulty.  Skin: Good turgor, no rash noted today, unusual bruising or prominent lesions.  Hair: Normal texture and distribution.  Nails: Normal color, no deformities.  HEENT:  Head: Normocephalic, atraumatic.  Eyes: Conjunctiva clear, sclera non-icteric, EOM intact.  Nose: No external lesions.  Neck: Supple.  Extremities: No amputations or deformities, cyanosis, edema.  Musculoskeletal:   Feet - mildly positive MTP squeeze test bilaterally.   There is no peripheral joint soft tissue swelling noted.  Neurologic: Alert and oriented. No focal neurological deficits appreciated.   Psychiatric: Normal mood and affect.

## 2024-10-31 NOTE — ASSESSMENT & PLAN NOTE
Orders:    C3 complement; Future    C4 complement; Future    Beta-2 glycoprotein antibodies; Future    Cardiolipin antibody; Future    Lupus anticoagulant; Future    Urinalysis with microscopic; Future    Protein / creatinine ratio, urine; Future    Anti-neutrophilic cytoplasmic antibody; Future    Anti-Odilia 1 Antibody; Future    Histone Antibody; Future    CK; Future    Cryoglobulin; Future    Ferritin; Future    Thyroid Antibodies Panel; Future    MyoMarker 3 Plus Profile (RDL); Future    C3 complement    C4 complement    Beta-2 glycoprotein antibodies    Cardiolipin antibody    Lupus anticoagulant    Urinalysis with microscopic    Protein / creatinine ratio, urine    Anti-neutrophilic cytoplasmic antibody    Anti-Odilia 1 Antibody    Histone Antibody    CK    Cryoglobulin    Ferritin    Thyroid Antibodies Panel    MyoMarker 3 Plus Profile (RDL)

## 2024-11-06 ENCOUNTER — PATIENT MESSAGE (OUTPATIENT)
Dept: UROLOGY | Facility: AMBULATORY SURGERY CENTER | Age: 49
End: 2024-11-06

## 2024-11-07 ENCOUNTER — HOSPITAL ENCOUNTER (EMERGENCY)
Facility: HOSPITAL | Age: 49
Discharge: HOME | End: 2024-11-07
Attending: EMERGENCY MEDICINE | Admitting: EMERGENCY MEDICINE
Payer: COMMERCIAL

## 2024-11-07 ENCOUNTER — APPOINTMENT (EMERGENCY)
Dept: RADIOLOGY | Facility: HOSPITAL | Age: 49
End: 2024-11-07
Payer: COMMERCIAL

## 2024-11-07 VITALS
RESPIRATION RATE: 20 BRPM | SYSTOLIC BLOOD PRESSURE: 149 MMHG | HEART RATE: 85 BPM | OXYGEN SATURATION: 99 % | WEIGHT: 143.6 LBS | BODY MASS INDEX: 28.04 KG/M2 | TEMPERATURE: 97.4 F | DIASTOLIC BLOOD PRESSURE: 75 MMHG

## 2024-11-07 DIAGNOSIS — N20.1 RIGHT URETERAL STONE: Primary | ICD-10-CM

## 2024-11-07 DIAGNOSIS — R30.0 DYSURIA: ICD-10-CM

## 2024-11-07 LAB
BACTERIA UR QL AUTO: ABNORMAL /HPF
BILIRUB UR QL STRIP: NEGATIVE
CLARITY UR: CLEAR
COLOR UR: YELLOW
EXT PREGNANCY TEST URINE: NEGATIVE
EXT. CONTROL: NORMAL
GLUCOSE UR STRIP-MCNC: NEGATIVE MG/DL
HGB UR QL STRIP.AUTO: ABNORMAL
KETONES UR STRIP-MCNC: NEGATIVE MG/DL
LEUKOCYTE ESTERASE UR QL STRIP: NEGATIVE
MUCOUS THREADS UR QL AUTO: ABNORMAL
NITRITE UR QL STRIP: NEGATIVE
NON-SQ EPI CELLS URNS QL MICRO: ABNORMAL /HPF
PH UR STRIP.AUTO: 6 [PH]
PROT UR STRIP-MCNC: ABNORMAL MG/DL
RBC #/AREA URNS AUTO: ABNORMAL /HPF
SP GR UR STRIP.AUTO: >=1.03 (ref 1–1.03)
UROBILINOGEN UR STRIP-ACNC: <2 MG/DL
WBC #/AREA URNS AUTO: ABNORMAL /HPF

## 2024-11-07 PROCEDURE — 99284 EMERGENCY DEPT VISIT MOD MDM: CPT

## 2024-11-07 PROCEDURE — 99284 EMERGENCY DEPT VISIT MOD MDM: CPT | Performed by: EMERGENCY MEDICINE

## 2024-11-07 PROCEDURE — 81001 URINALYSIS AUTO W/SCOPE: CPT | Performed by: EMERGENCY MEDICINE

## 2024-11-07 PROCEDURE — 74176 CT ABD & PELVIS W/O CONTRAST: CPT

## 2024-11-07 PROCEDURE — 81025 URINE PREGNANCY TEST: CPT | Performed by: EMERGENCY MEDICINE

## 2024-11-07 RX ORDER — ONDANSETRON 4 MG/1
4 TABLET, ORALLY DISINTEGRATING ORAL EVERY 6 HOURS PRN
Qty: 15 TABLET | Refills: 0 | Status: SHIPPED | OUTPATIENT
Start: 2024-11-07

## 2024-11-07 RX ORDER — PHENAZOPYRIDINE HYDROCHLORIDE 200 MG/1
200 TABLET, FILM COATED ORAL 3 TIMES DAILY
Qty: 6 TABLET | Refills: 0 | Status: SHIPPED | OUTPATIENT
Start: 2024-11-07 | End: 2024-11-11 | Stop reason: SDUPTHER

## 2024-11-07 RX ORDER — OXYCODONE AND ACETAMINOPHEN 5; 325 MG/1; MG/1
1 TABLET ORAL EVERY 4 HOURS PRN
Qty: 10 TABLET | Refills: 0 | Status: SHIPPED | OUTPATIENT
Start: 2024-11-07 | End: 2024-11-17

## 2024-11-07 RX ORDER — PHENAZOPYRIDINE HYDROCHLORIDE 100 MG/1
100 TABLET, FILM COATED ORAL ONCE
Status: COMPLETED | OUTPATIENT
Start: 2024-11-07 | End: 2024-11-07

## 2024-11-07 RX ADMIN — PHENAZOPYRIDINE 100 MG: 100 TABLET ORAL at 01:30

## 2024-11-07 NOTE — DISCHARGE INSTRUCTIONS
At this time you have a 3 mm stone on the right side right at the level of the bladder.  This will likely pass over the next few times to go to the bathroom.  I have sent you Percocet which you can take 1 tablet every 4-6 hours as needed for severe pain if 650 mg of Tylenol and 600 mg of ibuprofen is not improving your pain..  I did also send you Zofran if you need for nausea every 6 hours.  I also sent the Pyridium tablets which may help with the discomfort with urination 1 tablet 3 times a day.    Please follow-up with your urologist.    Return to ER if you develop fever.

## 2024-11-07 NOTE — Clinical Note
Karly Howell was seen and treated in our emergency department on 11/7/2024.                Diagnosis: Ureteral stone    Karly  may return to work on return date.    She may return on this date: 11/08/2024         If you have any questions or concerns, please don't hesitate to call.      Mike Pulido MD    ______________________________           _______________          _______________  Hospital Representative                              Date                                Time

## 2024-11-07 NOTE — ED PROVIDER NOTES
Time reflects when diagnosis was documented in both MDM as applicable and the Disposition within this note       Time User Action Codes Description Comment    11/7/2024  3:39 AM Mike Pulido Add [N20.1] Right ureteral stone     11/7/2024  3:40 AM Mike Pulido Add [R30.0] Dysuria           ED Disposition       ED Disposition   Discharge    Condition   Stable    Date/Time   Thu Nov 7, 2024  3:39 AM    Comment   Karly Howell discharge to home/self care.                   Assessment & Plan       Medical Decision Making  49-year-old female presenting to ED today with urinary symptoms.  At this time differential diagnose includes pyonephritis versus urinary tract infection.  She also has a history of intrarenal kidney stones.  Will start with a UA.  If there is no obvious signs of infection we will consider doing a CT renal stone study to evaluate for possible kidney stone.    UA only showing blood.  Given this I did order CT renal stone study.  Per my interpretation seems to be a right ureteral stone at the UVJ.  Will await formal radiology read.  No Signs of infection on UA.    Radiology to confirm right ureteral stone in the UVJ.  3 mm.  Will pass on her own.  Will send Percocet for pain control.  Will send Pyridium for the dysuria.  Will also send Zofran for nausea.    Amount and/or Complexity of Data Reviewed  Labs: ordered. Decision-making details documented in ED Course.  Radiology: ordered and independent interpretation performed.    Risk  Prescription drug management.        ED Course as of 11/07/24 0357   Thu Nov 07, 2024   0120 Urine Microscopic(!)  Given patient only has red blood cells and none white blood cells my concern would be for possible renal stone.  Will do CT renal stone study.       Medications   phenazopyridine (PYRIDIUM) tablet 100 mg (100 mg Oral Given 11/7/24 0130)       ED Risk Strat Scores                           SBIRT 20yo+      Flowsheet Row Most Recent Value   Initial Alcohol Screen:  US AUDIT-C     1. How often do you have a drink containing alcohol? 0 Filed at: 11/07/2024 0048   Audit-C Score 0 Filed at: 11/07/2024 0048   NERI: How many times in the past year have you...    Used an illegal drug or used a prescription medication for non-medical reasons? Never Filed at: 11/07/2024 0048                            History of Present Illness       Chief Complaint   Patient presents with    Possible UTI     Pt reports urinary frequency for a couple days and discomfort. Reports pelvic and r lower back discomfort. Pmh of kidney stones. Denies blood in urine       Past Medical History:   Diagnosis Date    Cervical radiculopathy     RESOLVED: [id number] buldging disc    GERD (gastroesophageal reflux disease)     Hyperlipidemia     Kidney stone     Migraines     Seizure (HCC)     age 24      Past Surgical History:   Procedure Laterality Date    ADENOIDECTOMY      COLONOSCOPY  09/26/2024    EGD  09/26/2024    EGD AND COLONOSCOPY  09/18/2019    LITHOTRIPSY  2006    RENAL    OVARIAN CYST REMOVAL  2012    [place] HYSTEROSCOPY BX ENDOMETRIUM&/POLYPC W/WO D&C N/A 10/28/2019    Procedure: OPERATIVE HYSTEROSCOPY (MYOSURE),POLYPECTOMY,D AND C;  Surgeon: Roel Rothman MD;  Location: [facility];  Service: Gynecology      Family History   Problem Relation Age of Onset    Multiple sclerosis Mother     Colon cancer Maternal Grandmother 70    Rheum arthritis Maternal Grandmother     Arthritis Family     Colon cancer Family     Osteoporosis Family     Anxiety disorder Father     Chromosomal disorder Cousin     No Known Problems Sister     No Known Problems Daughter     Skin cancer Maternal Grandfather 65        not sure of exact age.    No Known Problems Paternal Grandmother     No Known Problems Paternal Grandfather     No Known Problems Daughter     No Known Problems Son     No Known Problems Maternal Aunt     No Known Problems Maternal Aunt     No Known Problems Paternal Aunt       Social History     Tobacco Use    Smoking  status: Former     Current packs/day: 0.00     Average packs/day: 1 pack/day for 20.0 years (20.0 ttl pk-yrs)     Types: Cigarettes     Start date: 6/15/1992     Quit date: 2007     Years since quittin.5     Passive exposure: Never    Smokeless tobacco: Never   Vaping Use    Vaping status: Never Used   Substance Use Topics    Alcohol use: Not Currently     Comment: rare    Drug use: Yes     Types: Marijuana     Comment: gummy      E-Cigarette/Vaping    E-Cigarette Use Never User       E-Cigarette/Vaping Substances    Nicotine No     THC No     CBD No     Flavoring No     Other No     Unknown No       I have reviewed and agree with the history as documented.     49-year-old female presenting to ED today with urinary symptoms.  Patient states initially she went to urgent care on  had a UA there but did not grow anything out of it so she has not been on antibiotics.  She says that over the last couple days however her pain has been getting worse until today when it became more severe.  She is having increased urinary urgency as well as dysuria.  Noted some blood on the tissue after she peed here when she was wiping.  No fevers or chills.  Also some right sided lower flank pain.        Review of Systems   Constitutional:  Negative for chills and fever.   HENT:  Negative for hearing loss.    Eyes:  Negative for visual disturbance.   Respiratory:  Negative for shortness of breath.    Cardiovascular:  Negative for chest pain.   Gastrointestinal:  Negative for abdominal pain, constipation, diarrhea, nausea and vomiting.   Genitourinary:  Positive for dysuria and flank pain. Negative for difficulty urinating.   Musculoskeletal:  Negative for myalgias.   Skin:  Negative for color change.   Neurological:  Negative for dizziness and headaches.   Psychiatric/Behavioral:  Negative for agitation.    All other systems reviewed and are negative.          Objective       ED Triage Vitals [24 0045]   Temperature  Pulse Blood Pressure Respirations SpO2 Patient Position - Orthostatic VS   (!) 97.4 °F (36.3 °C) 85 149/75 20 99 % --      Temp Source Heart Rate Source BP Location FiO2 (%) Pain Score    Oral Monitor -- -- --      Vitals      Date and Time Temp Pulse SpO2 Resp BP Pain Score FACES Pain Rating User   11/07/24 0045 97.4 °F (36.3 °C) 85 99 % 20 149/75 -- -- SG            Physical Exam  Vitals and nursing note reviewed.   Constitutional:       General: She is not in acute distress.     Appearance: Normal appearance. She is well-developed. She is not ill-appearing.   HENT:      Head: Normocephalic and atraumatic.      Right Ear: External ear normal.      Left Ear: External ear normal.      Nose: Nose normal.      Mouth/Throat:      Mouth: Mucous membranes are moist.      Pharynx: Oropharynx is clear. No oropharyngeal exudate.   Eyes:      General:         Right eye: No discharge.         Left eye: No discharge.      Extraocular Movements: Extraocular movements intact.      Conjunctiva/sclera: Conjunctivae normal.      Pupils: Pupils are equal, round, and reactive to light.   Cardiovascular:      Rate and Rhythm: Normal rate and regular rhythm.      Heart sounds: Normal heart sounds. No murmur heard.     No friction rub. No gallop.   Pulmonary:      Effort: Pulmonary effort is normal. No respiratory distress.      Breath sounds: Normal breath sounds. No stridor. No wheezing.   Abdominal:      General: Bowel sounds are normal. There is no distension.      Palpations: Abdomen is soft.      Tenderness: There is abdominal tenderness. There is no right CVA tenderness or left CVA tenderness.      Comments: Suprapubic tenderness present.   Musculoskeletal:         General: No swelling. Normal range of motion.      Cervical back: Normal range of motion and neck supple. No rigidity.   Skin:     General: Skin is warm and dry.      Capillary Refill: Capillary refill takes less than 2 seconds.   Neurological:      General: No focal  deficit present.      Mental Status: She is alert and oriented to person, place, and time. Mental status is at baseline.      Motor: No weakness.      Gait: Gait normal.   Psychiatric:         Mood and Affect: Mood normal.         Behavior: Behavior normal.         Results Reviewed       Procedure Component Value Units Date/Time    POCT pregnancy, urine [[id number]]  (Normal) Resulted: 11/07/24 0119    Lab Status: Final result Updated: 11/07/24 0120     EXT Preg Test, Ur Negative     Control Valid    Urine Microscopic [[id number]]  (Abnormal) Collected: 11/07/24 0101    Lab Status: Final result Specimen: Urine, Clean Catch Updated: 11/07/24 0115     RBC, UA 20-30 /hpf      WBC, UA None Seen /hpf      Epithelial Cells Occasional /hpf      Bacteria, UA Occasional /hpf      MUCUS THREADS Moderate    UA w Reflex to Microscopic w Reflex to Culture [[id number]]  (Abnormal) Collected: 11/07/24 0101    Lab Status: Final result Specimen: Urine, Clean Catch Updated: 11/07/24 0108     Color, UA Yellow     Clarity, UA Clear     Specific Gravity, UA >=1.030     pH, UA 6.0     Leukocytes, UA Negative     Nitrite, UA Negative     Protein, UA Trace mg/dl      Glucose, UA Negative mg/dl      Ketones, UA Negative mg/dl      Urobilinogen, UA <2.0 mg/dl      Bilirubin, UA Negative     Occult Blood, UA Moderate            CT renal stone study abdomen pelvis wo contrast   ED Interpretation by Mike Pulido MD (11/07 0219)   Abnormal   I interpreted this Study as Renal stone at right UVJ. Will await formal Radiology read.      Final Interpretation by Brea Blas MD (11/07 0337)      3 mm calculus at the right ureterovesical junction causing mild hydroureteronephrosis.      Nonobstructing bilateral renal calculi.      The study was marked in EPIC for immediate notification.         Workstation performed: [id number]             Procedures    ED Medication and Procedure Management   Prior to Admission Medications   Prescriptions Last Dose  Informant Patient Reported? Taking?   Aimovig 140 MG/ML SOAJ  Self Yes No   Sig: ADMINISTER 1 ML UNDER THE SKIN EVERY 4 WEEKS   Casanthranol-Docusate Sodium (STOOL SOFTENER PLUS PO)  Self Yes No   Sig: Take by mouth Rarely takes as needed   SUMAtriptan (IMITREX) 100 mg tablet  Self No No   Sig: Take 1 tablet (100 mg total) by mouth once as needed for migraine for up to 60 doses   carBAMazepine (CARBATROL) 300 MG 12 hr capsule  Self Yes No   Si mg every 12 (twelve) hours    rosuvastatin (CRESTOR) 20 MG tablet  Self Yes No   Sig: Take 20 mg by mouth daily   valACYclovir (VALTREX) 500 mg tablet  Self Yes No   Si mg daily As needed      Facility-Administered Medications: None     Patient's Medications   Discharge Prescriptions    ONDANSETRON (ZOFRAN-ODT) 4 MG DISINTEGRATING TABLET    Take 1 tablet (4 mg total) by mouth every 6 (six) hours as needed for nausea       Start Date: 2024 End Date: --       Order Dose: 4 mg       Quantity: 15 tablet    Refills: 0    OXYCODONE-ACETAMINOPHEN (PERCOCET) 5-325 MG PER TABLET    Take 1 tablet by mouth every 4 (four) hours as needed for moderate pain for up to 10 days Max Daily Amount: 6 tablets       Start Date: 2024 End Date: 2024       Order Dose: 1 tablet       Quantity: 10 tablet    Refills: 0    PHENAZOPYRIDINE (PYRIDIUM) 200 MG TABLET    Take 1 tablet (200 mg total) by mouth 3 (three) times a day       Start Date: 2024 End Date: --       Order Dose: 200 mg       Quantity: 6 tablet    Refills: 0     No discharge procedures on file.  ED SEPSIS DOCUMENTATION   Time reflects when diagnosis was documented in both MDM as applicable and the Disposition within this note       Time User Action Codes Description Comment    2024  3:39 AM Mike Pulido [N20.1] Right ureteral stone     2024  3:40 AM Mike Pulido [R30.0] Dysuria                  Mike Pulido MD  24 0357       Mike Pulido MD  24 0357

## 2024-11-07 NOTE — PATIENT COMMUNICATION
Patient called in to report that she was in ED yesterday for having difficulty urinating. Patient has a 3 mm nonobstructing kidney stone. States she is getting the same symptoms today where she feels abdominal pressure. States she last urinated this morning and is drinking fluids. Offered to schedule a PVR for patient but she states she is in at a work training in [place] at the moment. Reviewed ED precautions. Please advise on recommendations.

## 2024-11-11 DIAGNOSIS — N20.0 CALCULUS OF KIDNEY: Primary | ICD-10-CM

## 2024-11-11 DIAGNOSIS — R30.0 DYSURIA: ICD-10-CM

## 2024-11-11 LAB — THYROPEROXIDASE AB SERPL-ACNC: <9 IU/ML (ref 0–34)

## 2024-11-11 RX ORDER — TAMSULOSIN HYDROCHLORIDE 0.4 MG/1
0.4 CAPSULE ORAL
Qty: 30 CAPSULE | Refills: 1 | Status: SHIPPED | OUTPATIENT
Start: 2024-11-11

## 2024-11-11 RX ORDER — PHENAZOPYRIDINE HYDROCHLORIDE 200 MG/1
200 TABLET, FILM COATED ORAL 3 TIMES DAILY
Qty: 6 TABLET | Refills: 0 | Status: SHIPPED | OUTPATIENT
Start: 2024-11-11

## 2024-11-11 NOTE — PATIENT COMMUNICATION
Patient calling for a refill of Pyridium.     She states she is urinating but does not feel like she is emptying bladder completely. She is drinking about 30 ounces of water daily.     Did encourage to increase water but patient concerned about not being able to eliminate.    Please advise on any other recommendations. Confirmed upcoming appointment on 11/20/24    [facility] on file

## 2024-11-12 LAB
BACTERIA URNS QL MICRO: ABNORMAL
C3 SERPL-MCNC: 119 MG/DL (ref 82–167)
C4 SERPL-MCNC: 17 MG/DL (ref 12–38)
CARDIOLIPIN IGA SER IA-ACNC: <9 APL U/ML (ref 0–11)
CARDIOLIPIN IGG SER IA-ACNC: 13 GPL U/ML (ref 0–14)
CARDIOLIPIN IGM SER IA-ACNC: 25 MPL U/ML (ref 0–12)
CASTS URNS QL MICRO: ABNORMAL /LPF
CK SERPL-CCNC: 49 U/L (ref 32–182)
CRYSTALS URNS MICRO: ABNORMAL
EJ AB SER QL: NORMAL
ENA JO1 AB SER IA-ACNC: NORMAL
ENA JO1 AB SER-ACNC: <0.2 AI (ref 0–0.9)
ENA PM/SCL AB SER-ACNC: NORMAL
ENA SS-A 52KD IGG SER IA-ACNC: NORMAL
EPI CELLS #/AREA URNS HPF: ABNORMAL /HPF (ref 0–10)
FERRITIN SERPL-MCNC: 43 NG/ML (ref 15–150)
FIBRILLARIN AB SER QL: NORMAL
KU AB SER QL: NORMAL
MDA5 AB SER LINE BLOT-ACNC: NORMAL
MI2 AB SER QL: NORMAL
MICRO URNS: NORMAL
MJ AB SER LINE BLOT-ACNC: NORMAL
OJ AB SER QL: NORMAL
PL12 AB SER QL: NORMAL
PL7 AB SER QL: NORMAL
PROT UR-MCNC: <4 MG/DL
PROT/CREAT UR: ABNORMAL MG/G CREAT (ref 0–200)
RBC #/AREA URNS HPF: ABNORMAL /HPF (ref 0–2)
SAE1 IGG SER QL LINE BLOT: NORMAL
SRP AB SERPL QL: NORMAL
THYROGLOB AB SERPL-ACNC: <1 IU/ML (ref 0–0.9)
TIF1-GAMMA AB SER LINE BLOT-ACNC: NORMAL
U1 SNRNP AB SER IA-ACNC: NORMAL
U2 SNRNP AB SER QL: NORMAL
UNIDENT CRYS URNS QL MICRO: PRESENT
WBC #/AREA URNS HPF: ABNORMAL /HPF (ref 0–5)

## 2024-11-13 LAB
B2 GLYCOPROT1 IGA SER-ACNC: <9 GPI IGA UNITS (ref 0–25)
B2 GLYCOPROT1 IGG SER-ACNC: <9 GPI IGG UNITS (ref 0–20)
B2 GLYCOPROT1 IGM SER-ACNC: <9 GPI IGM UNITS (ref 0–32)
C-ANCA TITR SER IF: NORMAL TITER
HISTONE IGG SER IA-ACNC: 0.4 UNITS (ref 0–0.9)
LA PPP-IMP: NORMAL
P-ANCA ATYPICAL TITR SER IF: NORMAL TITER
P-ANCA TITR SER IF: NORMAL TITER

## 2024-11-15 LAB — CRYOGLOB SER QL 1D COLD INC: NORMAL

## 2024-11-18 ENCOUNTER — RESULTS FOLLOW-UP (OUTPATIENT)
Dept: RHEUMATOLOGY | Facility: CLINIC | Age: 49
End: 2024-11-18

## 2024-11-20 ENCOUNTER — OFFICE VISIT (OUTPATIENT)
Dept: UROLOGY | Facility: AMBULATORY SURGERY CENTER | Age: 49
End: 2024-11-20

## 2024-11-20 VITALS
SYSTOLIC BLOOD PRESSURE: 118 MMHG | HEIGHT: 60 IN | DIASTOLIC BLOOD PRESSURE: 78 MMHG | OXYGEN SATURATION: 98 % | BODY MASS INDEX: 27.88 KG/M2 | WEIGHT: 142 LBS | HEART RATE: 94 BPM

## 2024-11-20 DIAGNOSIS — N20.0 NEPHROLITHIASIS: ICD-10-CM

## 2024-11-20 DIAGNOSIS — R31.0 GROSS HEMATURIA: ICD-10-CM

## 2024-11-20 DIAGNOSIS — N20.0 CALCULUS OF KIDNEY: Primary | ICD-10-CM

## 2024-11-20 NOTE — ASSESSMENT & PLAN NOTE
Patient reports intermittent episodes of hematuria, most recently hematuria was related to an underlying kidney stone that she has since passed, she states that she has had hematuria in the past and unsure if it was related to kidney stones, urine cultures on file have been negative for bacteria.  She had a CT renal protocol on 10/17/2024 that demonstrated bilateral nonobstructing renal calculi, bilateral simple renal cyst and mild bladder wall thickening.  I do recommend that she proceed with cystoscopy to complete the hematuria workup due to having episodes of hematuria that have not been related to kidney stone passage or UTI.  I explained to the patient that this cystoscopy is to further examine the bladder to make sure there is no underlying malignancy or abnormalities that can be contributing to her hematuria.  She is understanding of thi and all questions and concerns were answered at today's office visit.s

## 2024-11-20 NOTE — PROGRESS NOTES
Assessment and plan:     Nephrolithiasis  Patient was seen and examined in the office on 10/2/2024 by Dr. Fernandes, a CT renal protocol was ordered to evaluate for nonobstructing kidney stones and right sided hydronephrosis, she does report that prior to visit she believed she passed another small kidney stone   Patient presented to the ER in 11/7/2024 for ongoing urinary symptoms and right flank pain  CT renal protocol on 10/17/2024 demonstrating bilateral nonobstructing renal calculi measuring up to 4 mm, bilateral simple renal cysts, mild bladder wall thickening, no hydronephrosis seen bilaterally  CT stone study on 11/7/2024 demonstrating a 3 mm calculus at the right UVJ causing mild hydroureteronephrosis, images personally reviewed with patient during office visit   Reports passing the kidney stone, will get KUB for further evaluation and to determine if 4 mm kidney stone on the left side is visualized with x-ray and can proceed with ESWL, she wishes to hold off on any formal case request as she wants to discuss with her   Discussed diet and lifestyle modifications to help prevent future kidney stone formation such as increasing water and fluid intake to 2 to 3 L of water daily, adding citrus to drinks, and following a low calcium oxalate diet  Will call patient with results of KUB and she will decide plan moving forward    Gross hematuria  Patient reports intermittent episodes of hematuria, most recently hematuria was related to an underlying kidney stone that she has since passed, she states that she has had hematuria in the past and unsure if it was related to kidney stones, urine cultures on file have been negative for bacteria.  She had a CT renal protocol on 10/17/2024 that demonstrated bilateral nonobstructing renal calculi, bilateral simple renal cyst and mild bladder wall thickening.  I do recommend that she proceed with cystoscopy to complete the hematuria workup due to having episodes of  hematuria that have not been related to kidney stone passage or UTI.  I explained to the patient that this cystoscopy is to further examine the bladder to make sure there is no underlying malignancy or abnormalities that can be contributing to her hematuria.  She is understanding of thi and all questions and concerns were answered at today's office visit.s     History of Present Illness     Karly Howell is a 49 y.o. female who presents today to the office for follow-up of nephrolithiasis, ureterolithiasis, and gross hematuria.    She was last seen by Dr. Fernandes on 10/2/2024 due to having mild right-sided hydronephrosis or ultrasound imaging from September 2024.  She also reported that she was having intermittent episodes of gross hematuria.  She does report that prior to this office visit she believes she did pass the kidney stone when she was in [place].  There is no imaging from this time to determine if it was indeed a kidney stone.  Due to the hydronephrosis a CT renal protocol was ordered and was overall unremarkable.    On 11/7/2024 she reported to the ER due to ongoing urinary symptoms and right sided flank pain.  She was found to have a 3 mm kidney stone in the right UVJ.  She states that she has since passed to the kidney stone.  She denies any pain or discomfort.  She denies any ongoing hematuria.  She states that she was uncomfortable for a couple of days after the kidney stone passage with dysuria and urinary urgency/frequency.  She states that this has since resolved completely.  She does report that she took Flomax for 2 days during this event and she believes that is what allowed her to pass the kidney stone.    She does have a history of kidney stones and has required surgical intervention in the past.  She states that approximately 18 years ago while she was pregnant she required intervention with a stent placement.  She states that she also had ESWL done in the past for definitive stone  "management.  She states that she has not had any problems with kidney stones since that time until recently.    She does report that she has had intermittent episodes of hematuria in the past and she is unsure if this is related to kidney stones or UTIs.  She does report a smoking history but quit several years ago.  She reports that when she was smoking cigarettes it was less than half a pack per day.    She denies any urinary/urological complaints today in the office.    Laboratory     Lab Results   Component Value Date    BUN 15 10/12/2024    CREATININE 0.77 10/12/2024       No components found for: \"GFR\"    Lab Results   Component Value Date    GLUCOSE 92 05/16/2017    CALCIUM 8.4 05/19/2024     07/18/2016    K 4.6 10/12/2024    CO2 26 10/12/2024     10/12/2024       Lab Results   Component Value Date    WBC 8.44 05/19/2024    HGB 12.9 05/19/2024    HCT 38.4 05/19/2024    MCV 94 05/19/2024     05/19/2024       No results found for: \"PSA\"    No results found for this or any previous visit (from the past hour).    Review of Systems     Review of Systems   Constitutional:  Negative for chills and fever.   Respiratory: Negative.  Negative for cough and shortness of breath.    Cardiovascular:  Negative for chest pain and leg swelling.   Genitourinary:  Negative for dyspareunia, dysuria, flank pain, frequency, hematuria, menstrual problem, pelvic pain, urgency, vaginal bleeding, vaginal discharge and vaginal pain.   Skin:  Negative for rash.   Neurological: Negative.    Hematological:  Negative for adenopathy. Does not bruise/bleed easily.                 Allergies     Allergies   Allergen Reactions    Ciprofloxacin Lightheadedness     dizzy    Phenytoin Rash     Reaction Date: 05Oct2010;        Physical Exam     Physical Exam  Vitals reviewed.   Constitutional:       Appearance: Normal appearance.   HENT:      Head: Normocephalic and atraumatic.   Eyes:      Pupils: Pupils are equal, round, and " reactive to light.   Cardiovascular:      Rate and Rhythm: Normal rate.   Pulmonary:      Effort: Pulmonary effort is normal.   Musculoskeletal:      Cervical back: Normal range of motion.   Skin:     General: Skin is warm and dry.   Neurological:      General: No focal deficit present.      Mental Status: She is alert and oriented to person, place, and time. Mental status is at baseline.   Psychiatric:         Mood and Affect: Mood normal.         Behavior: Behavior normal.         Thought Content: Thought content normal.         Judgment: Judgment normal.         Vital Signs     Vitals:    11/20/24 0955   BP: 118/78   BP Location: Left arm   Patient Position: Sitting   Cuff Size: Adult   Pulse: 94   SpO2: 98%   Weight: 64.4 kg (142 lb)   Height: 5' (1.524 m)       Current Medications       Current Outpatient Medications:     Aimovig 140 MG/ML SOAJ, ADMINISTER 1 ML UNDER THE SKIN EVERY 4 WEEKS, Disp: , Rfl:     carBAMazepine (CARBATROL) 300 MG 12 hr capsule, 300 mg every 12 (twelve) hours , Disp: , Rfl: 1    Casanthranol-Docusate Sodium (STOOL SOFTENER PLUS PO), Take by mouth Rarely takes as needed, Disp: , Rfl:     rosuvastatin (CRESTOR) 20 MG tablet, Take 20 mg by mouth daily, Disp: , Rfl:     SUMAtriptan (IMITREX) 100 mg tablet, Take 1 tablet (100 mg total) by mouth once as needed for migraine for up to 60 doses, Disp: 30 tablet, Rfl: 1    tamsulosin (FLOMAX) 0.4 mg, Take 1 capsule (0.4 mg total) by mouth daily with dinner, Disp: 30 capsule, Rfl: 1    valACYclovir (VALTREX) 500 mg tablet, 500 mg daily As needed, Disp: , Rfl: 2    ondansetron (ZOFRAN-ODT) 4 mg disintegrating tablet, Take 1 tablet (4 mg total) by mouth every 6 (six) hours as needed for nausea (Patient not taking: Reported on 11/20/2024), Disp: 15 tablet, Rfl: 0    phenazopyridine (PYRIDIUM) 200 mg tablet, Take 1 tablet (200 mg total) by mouth 3 (three) times a day (Patient not taking: Reported on 11/20/2024), Disp: 6 tablet, Rfl: 0    Active  Problems     Patient Active Problem List   Diagnosis    Classic migraine with aura    Anxiety disorder    Generalized nonconvulsive seizure (HCC)    Hyperlipemia, mixed    Localization-related (focal) (partial) symptomatic epilepsy and epileptic syndromes with complex partial seizures, not intractable, without status epilepticus (HCC)    Condyloma acuminatum of vulva    Cyst of ovary    Epilepsy (HCC)    Fibrocystic disease of breast    Genital herpes simplex    Genital warts    Hemorrhoids    Herpes simplex    Neoplasm of uncertain behavior of vagina    Torsion of the ovary, ovarian pedicle or fallopian tube    Change in bowel habits    Abdominal bloating    Seizure disorder, temporal lobe, intractable (HCC)    Pruritic condition    Allergic dermatitis    Hematuria    Nausea    Nephrolithiasis    Positive FEMI (antinuclear antibody)    Anti-cardiolipin antibody positive    Photosensitivity    Generalized abdominal pain    History of colon polyps    Skin rash    Bilateral foot pain    Chronic dryness of both eyes    Gross hematuria       Past Medical History     Past Medical History:   Diagnosis Date    Cervical radiculopathy     RESOLVED: [id number] buldging disc    GERD (gastroesophageal reflux disease)     Hyperlipidemia     Kidney stone     Migraines     Seizure (HCC)     age 24       Surgical History     Past Surgical History:   Procedure Laterality Date    ADENOIDECTOMY      COLONOSCOPY  09/26/2024    EGD  09/26/2024    EGD AND COLONOSCOPY  09/18/2019    LITHOTRIPSY  2006    RENAL    OVARIAN CYST REMOVAL  2012    NE HYSTEROSCOPY BX ENDOMETRIUM&/POLYPC W/WO D&C N/A 10/28/2019    Procedure: OPERATIVE HYSTEROSCOPY (MYOSURE),POLYPECTOMY,D AND C;  Surgeon: Roel Rothman MD;  Location: [facility];  Service: Gynecology       Family History     Family History   Problem Relation Age of Onset    Multiple sclerosis Mother     Colon cancer Maternal Grandmother 70    Rheum arthritis Maternal Grandmother     Arthritis Family      Colon cancer Family     Osteoporosis Family     Anxiety disorder Father     Chromosomal disorder Cousin     No Known Problems Sister     No Known Problems Daughter     Skin cancer Maternal Grandfather 65        not sure of exact age.    No Known Problems Paternal Grandmother     No Known Problems Paternal Grandfather     No Known Problems Daughter     No Known Problems Son     No Known Problems Maternal Aunt     No Known Problems Maternal Aunt     No Known Problems Paternal Aunt        Social History     Social History     Social History     Tobacco Use   Smoking Status Former    Current packs/day: 0.00    Average packs/day: 1 pack/day for 20.0 years (20.0 ttl pk-yrs)    Types: Cigarettes    Start date: 6/15/1992    Quit date: 2007    Years since quittin.5    Passive exposure: Never   Smokeless Tobacco Never       Past Surgical History:   Procedure Laterality Date    ADENOIDECTOMY      COLONOSCOPY  2024    EGD  2024    EGD AND COLONOSCOPY  2019    LITHOTRIPSY      RENAL    OVARIAN CYST REMOVAL  2012    WI HYSTEROSCOPY BX ENDOMETRIUM&/POLYPC W/WO D&C N/A 10/28/2019    Procedure: OPERATIVE HYSTEROSCOPY (MYOSURE),POLYPECTOMY,D AND C;  Surgeon: Roel Rothman MD;  Location: [facility];  Service: Gynecology         The following portions of the patient's history were reviewed and updated as appropriate: allergies, current medications, past family history, past medical history, past social history, past surgical history and problem list    Please note :  Voice dictation software has been used to create this document.  There may be inadvertent transcription errors.    GAGANDEEP Marroquin

## 2024-11-20 NOTE — ASSESSMENT & PLAN NOTE
Patient was seen and examined in the office on 10/2/2024 by Dr. Fernandes, a CT renal protocol was ordered to evaluate for nonobstructing kidney stones and right sided hydronephrosis, she does report that prior to visit she believed she passed another small kidney stone   Patient presented to the ER in 11/7/2024 for ongoing urinary symptoms and right flank pain  CT renal protocol on 10/17/2024 demonstrating bilateral nonobstructing renal calculi measuring up to 4 mm, bilateral simple renal cysts, mild bladder wall thickening, no hydronephrosis seen bilaterally  CT stone study on 11/7/2024 demonstrating a 3 mm calculus at the right UVJ causing mild hydroureteronephrosis, images personally reviewed with patient during office visit   Reports passing the kidney stone, will get KUB for further evaluation and to determine if 4 mm kidney stone on the left side is visualized with x-ray and can proceed with ESWL, she wishes to hold off on any formal case request as she wants to discuss with her   Discussed diet and lifestyle modifications to help prevent future kidney stone formation such as increasing water and fluid intake to 2 to 3 L of water daily, adding citrus to drinks, and following a low calcium oxalate diet  Will call patient with results of KUB and she will decide plan moving forward

## 2024-11-23 LAB — MJ AB SER LINE BLOT-ACNC: <20 UNITS

## 2024-11-26 LAB — SAE1 IGG SER QL LINE BLOT: <20 UNITS

## 2024-12-05 DIAGNOSIS — J45.909 UNCOMPLICATED ASTHMA, UNSPECIFIED ASTHMA SEVERITY, UNSPECIFIED WHETHER PERSISTENT: Primary | ICD-10-CM

## 2024-12-06 RX ORDER — ROSUVASTATIN CALCIUM 20 MG/1
20 TABLET, COATED ORAL DAILY
Qty: 30 TABLET | Refills: 1 | Status: SHIPPED | OUTPATIENT
Start: 2024-12-06

## 2024-12-07 LAB
EJ AB SER QL: NEGATIVE
ENA JO1 AB SER IA-ACNC: <20 UNITS
ENA PM/SCL AB SER-ACNC: <20 UNITS
ENA SS-A 52KD IGG SER IA-ACNC: <20 UNITS
FIBRILLARIN AB SER QL: NEGATIVE
KU AB SER QL: NEGATIVE
MDA5 AB SER LINE BLOT-ACNC: <20 UNITS
MI2 AB SER QL: NEGATIVE
MJ AB SER LINE BLOT-ACNC: <20 UNITS
OJ AB SER QL: NEGATIVE
PL12 AB SER QL: NEGATIVE
PL7 AB SER QL: NEGATIVE
SAE1 IGG SER QL LINE BLOT: <20 UNITS
SRP AB SERPL QL: NEGATIVE
TIF1-GAMMA AB SER LINE BLOT-ACNC: <20 UNITS
U1 SNRNP AB SER IA-ACNC: <20 UNITS
U2 SNRNP AB SER QL: NEGATIVE

## 2024-12-31 DIAGNOSIS — G43.109 MIGRAINE WITH AURA AND WITHOUT STATUS MIGRAINOSUS, NOT INTRACTABLE: ICD-10-CM

## 2024-12-31 RX ORDER — SUMATRIPTAN SUCCINATE 100 MG/1
100 TABLET ORAL ONCE AS NEEDED
Qty: 30 TABLET | Refills: 0 | Status: SHIPPED | OUTPATIENT
Start: 2024-12-31

## 2025-01-02 DIAGNOSIS — Z00.6 ENCOUNTER FOR EXAMINATION FOR NORMAL COMPARISON OR CONTROL IN CLINICAL RESEARCH PROGRAM: ICD-10-CM

## 2025-01-14 ENCOUNTER — OFFICE VISIT (OUTPATIENT)
Dept: FAMILY MEDICINE CLINIC | Facility: CLINIC | Age: 50
End: 2025-01-14
Payer: COMMERCIAL

## 2025-01-14 VITALS
WEIGHT: 144 LBS | HEART RATE: 76 BPM | DIASTOLIC BLOOD PRESSURE: 68 MMHG | HEIGHT: 60 IN | BODY MASS INDEX: 28.27 KG/M2 | TEMPERATURE: 98.7 F | RESPIRATION RATE: 18 BRPM | SYSTOLIC BLOOD PRESSURE: 106 MMHG

## 2025-01-14 DIAGNOSIS — J30.9 ALLERGIC RHINITIS, UNSPECIFIED SEASONALITY, UNSPECIFIED TRIGGER: ICD-10-CM

## 2025-01-14 DIAGNOSIS — Z13.6 SCREENING FOR CARDIOVASCULAR CONDITION: ICD-10-CM

## 2025-01-14 DIAGNOSIS — B35.1 NAIL FUNGUS: ICD-10-CM

## 2025-01-14 DIAGNOSIS — M79.671 PAIN IN BOTH FEET: Primary | ICD-10-CM

## 2025-01-14 DIAGNOSIS — G40.219 LOCALIZATION-RELATED (FOCAL) (PARTIAL) SYMPTOMATIC EPILEPSY AND EPILEPTIC SYNDROMES WITH COMPLEX PARTIAL SEIZURES, INTRACTABLE, WITHOUT STATUS EPILEPTICUS (HCC): ICD-10-CM

## 2025-01-14 DIAGNOSIS — M79.672 PAIN IN BOTH FEET: Primary | ICD-10-CM

## 2025-01-14 PROCEDURE — 99214 OFFICE O/P EST MOD 30 MIN: CPT | Performed by: FAMILY MEDICINE

## 2025-01-14 RX ORDER — MONTELUKAST SODIUM 10 MG/1
10 TABLET ORAL
Qty: 30 TABLET | Refills: 1 | Status: SHIPPED | OUTPATIENT
Start: 2025-01-14

## 2025-01-14 RX ORDER — CICLOPIROX 80 MG/ML
SOLUTION TOPICAL
Qty: 6 ML | Refills: 0 | Status: SHIPPED | OUTPATIENT
Start: 2025-01-14

## 2025-01-14 NOTE — ASSESSMENT & PLAN NOTE
Orders:  •  montelukast (SINGULAIR) 10 mg tablet; Take 1 tablet (10 mg total) by mouth daily at bedtime

## 2025-01-14 NOTE — PROGRESS NOTES
Name: Karly Howell      : 1975      MRN: [id number]  Encounter Provider: Frank Lombardi, DO  Encounter Date: 2025   Encounter department: [facility]  :  Assessment & Plan  Pain in both feet    Orders:  •  XR foot 3+ vw left; Future  •  XR foot 3+ vw right; Future  •  Ambulatory referral to Podiatry; Future    Localization-related (focal) (partial) symptomatic epilepsy and epileptic syndromes with complex partial seizures, intractable, without status epilepticus (HCC)         Nail fungus    Orders:  •  ciclopirox (PENLAC) 8 % solution; Apply topically daily at bedtime    Allergic rhinitis, unspecified seasonality, unspecified trigger    Orders:  •  montelukast (SINGULAIR) 10 mg tablet; Take 1 tablet (10 mg total) by mouth daily at bedtime    Screening for cardiovascular condition    Orders:  •  Comprehensive metabolic panel; Future  •  Lipid Panel with Direct LDL reflex; Future           History of Present Illness     Pt states her family made her come in  Pt states her feet have been hurting - musa when she gets up on them in the am. Ionce she gets up and is mopving she improves.  In the begionning though it hurts.  Both her feet    Rt one is worse.  No pain as she sits.      Pt states she also has nail funguis on the 5th digit rt foot    Pt states also while she is here she has been getting congestion on and off.  Started a few weeks ago  Has been waking uip with JOHNS in the AM      Review of Systems   HENT:  Positive for congestion.    Musculoskeletal:  Positive for arthralgias.   Skin:         Thick nail       Objective   /68   Pulse 76   Temp 98.7 °F (37.1 °C)   Resp 18   Ht 5' (1.524 m)   Wt 65.3 kg (144 lb)   BMI 28.12 kg/m²      Physical Exam  HENT:      Nose: Congestion present.   Musculoskeletal:      Comments: Tender in lateral aspect rt foot  No erythema   Skin:     Comments: Thickened yellow 5th digit rt foot

## 2025-01-15 RX ORDER — MONTELUKAST SODIUM 10 MG/1
10 TABLET ORAL
Qty: 90 TABLET | OUTPATIENT
Start: 2025-01-15

## 2025-01-31 DIAGNOSIS — G43.109 MIGRAINE WITH AURA AND WITHOUT STATUS MIGRAINOSUS, NOT INTRACTABLE: ICD-10-CM

## 2025-01-31 RX ORDER — SUMATRIPTAN SUCCINATE 100 MG/1
100 TABLET ORAL ONCE AS NEEDED
Qty: 9 TABLET | Refills: 0 | Status: SHIPPED | OUTPATIENT
Start: 2025-01-31

## 2025-02-03 ENCOUNTER — OFFICE VISIT (OUTPATIENT)
Dept: RHEUMATOLOGY | Facility: CLINIC | Age: 50
End: 2025-02-03
Payer: COMMERCIAL

## 2025-02-03 VITALS
HEART RATE: 82 BPM | HEIGHT: 60 IN | SYSTOLIC BLOOD PRESSURE: 118 MMHG | WEIGHT: 143 LBS | DIASTOLIC BLOOD PRESSURE: 70 MMHG | BODY MASS INDEX: 28.07 KG/M2 | OXYGEN SATURATION: 98 %

## 2025-02-03 DIAGNOSIS — R76.0 ANTI-CARDIOLIPIN ANTIBODY POSITIVE: ICD-10-CM

## 2025-02-03 DIAGNOSIS — R21 SKIN RASH: ICD-10-CM

## 2025-02-03 DIAGNOSIS — H04.123 CHRONIC DRYNESS OF BOTH EYES: ICD-10-CM

## 2025-02-03 DIAGNOSIS — R76.8 POSITIVE ANA (ANTINUCLEAR ANTIBODY): Primary | ICD-10-CM

## 2025-02-03 DIAGNOSIS — M79.671 BILATERAL FOOT PAIN: ICD-10-CM

## 2025-02-03 DIAGNOSIS — L56.8 PHOTOSENSITIVITY: ICD-10-CM

## 2025-02-03 DIAGNOSIS — M79.672 BILATERAL FOOT PAIN: ICD-10-CM

## 2025-02-03 PROCEDURE — 99214 OFFICE O/P EST MOD 30 MIN: CPT | Performed by: INTERNAL MEDICINE

## 2025-02-03 NOTE — PROGRESS NOTES
"Ambulatory Visit  Name: Karly Howell      : 1975      MRN: [id number]  Encounter Provider: Mary Matthews MD  Encounter Date: 2/3/2025   Encounter department: [facility] [place]    Assessment & Plan  Positive FEMI (antinuclear antibody)  Ms. Howell is a 49-year-old female with no significant past medical history who presents for a follow up of a positive FEMI 1:80 speckled pattern, persistently positive anticardiolipin IgM antibody and skin rash.       - Karly presents today for a follow up of a positive FEMI and anticardiolipin IgM antibody that were detected to evaluate photosensitive and possibly sunscreen associated skin rashes she has been experiencing since last summer.  This has predominantly affected her face in a malar distribution and has been more frequent recently, with prior occurrences also involving her neck, chest, upper arms as well as an isolated self resolving episode of skin rashes/erythema overlying her joints.  Her review of systems is also pertinent for chronic dry eyes as well as bilateral forefoot pain over the past few months.  Her physical examination is unrevealing and there are no prominent cutaneous manifestations identified.  Given this presentation it may raise suspicion for an underlying autoimmune disease, but her serological workup has been unrevealing except for a positive FEMI which would be insufficient to establish a diagnosis.  In view of this I recommend considering a skin biopsy from an active rash or possibly even from \"normal\" appearing skin as histopathological changes can still be identified if there are concerns for lupus.  She will discuss this with her dermatologist at their upcoming appointment.  If she is hesitant to proceed with a facial biopsy then we can monitor for a recurrence of other areas of skin rash in the summer that may be amenable to biopsy.    - We discussed non-rheumatic causes of the skin rash may include solar related " skin conditions versus possibly an allergic response.  She will monitor to see if there may be an association with injecting Aimovig, but there is no clear literature on cutaneous side effects.         Anti-cardiolipin antibody positive  - She denies a history of thrombotic events.  To review the persistently positive anticardiolipin antibody, I recommend an appointment with hematology.    Orders:    Ambulatory Referral to Hematology / Oncology; Future    Photosensitivity         Skin rash         Bilateral foot pain         Chronic dryness of both eyes  - As she does experience chronic sicca complaints, with a history of skin rash and photosensitivity, it may be reasonable to evaluate for Sjogren's syndrome.  I offered her a referral to ENT for consideration of a minor salivary gland lip biopsy.    Orders:    Ambulatory Referral to Otolaryngology; Future          History of Present Illness       INITIAL VISIT NOTE (10/2024):  Ms. Howell is a 49-year-old female with no significant past medical history who presents for an evaluation of a positive FEMI 1:80 speckled pattern, anticardiolipin IgM antibody of 27 and skin rash.  She is referred by Dr. Ruiz for a rheumatology consult.    Patient reports in the summer of 2023 she started to appreciate a raised, red and itchy skin rash appearing like pimples on her face in a malar distribution.  She initially thought this might be a reaction to wearing sunscreen as the rash was only occurring with application of sunscreen and sun exposure.  The first time this occurred she was at [place] with prolonged sun exposure.  She was seen at the urgent care and prescribed a course of oral steroids which did help to clear up the rash.  Following this she tried to minimize sun exposure and stopped using sunscreen.  She reports even with using moisturizers that contain SPF the same reaction would occur.  She is now trying to use a zinc only sunscreen but states that she reacts  negatively to this as well.  She reports the rash on her face has been occurring more frequently recently and is present multiple times in a week.  It does resolve spontaneously within 1 to 2 days.  She is not using any medications for this.  She does have a steroid cream at home but has not utilized it.  She mentions other than the rash on her face she also noticed redness occurring on her chest, neck and upper arms also with the use of sunscreen and sun exposure.  With discontinuation of sunscreen use and being careful with sun exposure she has not noticed this type of rash to recur recently.  In July 2024 she also noticed a rash overlying her joints such as on her elbows and ankles which resolved spontaneously within 1 week and has not recurred.  She was unable to identify a trigger.  In view of the ongoing skin rashes she was seen by dermatology in [place] and advised to see a rheumatologist.  She was prescribed a steroid cream which did not really help.  She was also advised to return for an office visit to obtain a skin biopsy when the rash recurs.    She was seen by her primary care physician at the request of dermatology and had testing done which showed the positive FEMI and anticardiolipin IgM antibody.  An FEMI specificity, ESR, CRP, rheumatoid factor, anti-CCP antibody and celiac disease antibody profile were unremarkable.    She reports chronic dry eyes.  She has previously used an over-the-counter lubricating gel but as it was too thick she discontinued use.  She is not using any lubricating agents currently.  She also appreciates occasional dry mouth but this is not a significant symptom to her.  For the past few months she has appreciated pain affecting her bilateral feet which is present fairly constantly and mostly in the location of her forefoot.  No other concerning joint pains.  She denies swollen or particularly stiff joints.  She denies fevers, unintentional weight loss, focal alopecia,  inflammatory eye disease, psoriasis, mouth/nose ulcers, swollen glands, pleuritic chest pain, inflammatory bowel disease, blood clots, miscarriages, Raynaud's or muscle weakness.  She reports a history of Crohn's disease in a cousin, her aunt possibly has celiac disease and her grandmother has rheumatoid arthritis.  There is also family history of multiple sclerosis.      2/3/2025:  Patient presents for a follow-up of a positive FEMI.  I reviewed her labs done after the last visit which shows a persistently positive anticardiolipin IgM antibody of 25.  A C3, C4, beta-2 glycoprotein antibodies, lupus anticoagulant, urine analysis, urine protein creatinine ratio, antineutrophil cytoplasmic antibodies, Odilia 1 antibody, histone, CK, cryoglobulins, ferritin, myositis antibody profile and thyroid antibody profile were unremarkable.    She mentions since the last visit she has still experienced a bumpy rash on her face, primarily on the cheeks and near her hairline.  It is itchy.  She does have topical steroids prescribed by her dermatologist to use as needed.  She is still hesitant to proceed with a face biopsy.  She has not had a skin rash elsewhere on her body since our last visit but thinks this may recur during the summer when she is exposed to the sun.    She continues to experience dry eyes which was also confirmed by her ophthalmologist.  No significant dry mouth.  Of note she denies a history of thrombotic events or miscarriages.      Review of Systems  Constitutional: Negative for weight change, fevers, chills, night sweats, fatigue.  ENT/Mouth: Negative for hearing changes, ear pain, nasal congestion, sinus pain, hoarseness, sore throat, rhinorrhea, swallowing difficulty.   Eyes: Negative for pain, redness, discharge, vision changes.   Cardiovascular: Negative for chest pain, SOB, palpitations.   Respiratory: Negative for cough, sputum, wheezing, dyspnea.   Gastrointestinal: Negative for nausea, vomiting, diarrhea,  constipation, pain, heartburn.  Genitourinary: Negative for dysuria, urinary frequency, hematuria.   Musculoskeletal: As per HPI.  Skin: Negative for color changes.  Positive for skin rash.   Neuro: Negative for weakness, numbness, tingling, loss of consciousness.   Psych: Negative for anxiety, depression.   Heme/Lymph: Negative for easy bruising, bleeding, lymphadenopathy.      Past Medical History   Past Medical History:   Diagnosis Date    Cervical radiculopathy     RESOLVED: [id number] buldging disc    GERD (gastroesophageal reflux disease)     Hyperlipidemia     Kidney stone     Migraines     Seizure (HCC)     age 24     Past Surgical History:   Procedure Laterality Date    ADENOIDECTOMY      COLONOSCOPY  09/26/2024    EGD  09/26/2024    EGD AND COLONOSCOPY  09/18/2019    LITHOTRIPSY  2006    RENAL    OVARIAN CYST REMOVAL  2012    NC HYSTEROSCOPY BX ENDOMETRIUM&/POLYPC W/WO D&C N/A 10/28/2019    Procedure: OPERATIVE HYSTEROSCOPY (MYOSURE),POLYPECTOMY,D AND C;  Surgeon: Roel Rothman MD;  Location: [facility];  Service: Gynecology     Family History   Problem Relation Age of Onset    Multiple sclerosis Mother     Colon cancer Maternal Grandmother 70    Rheum arthritis Maternal Grandmother     Arthritis Family     Colon cancer Family     Osteoporosis Family     Anxiety disorder Father     Chromosomal disorder Cousin     No Known Problems Sister     No Known Problems Daughter     Skin cancer Maternal Grandfather 65        not sure of exact age.    No Known Problems Paternal Grandmother     No Known Problems Paternal Grandfather     No Known Problems Daughter     No Known Problems Son     No Known Problems Maternal Aunt     No Known Problems Maternal Aunt     No Known Problems Paternal Aunt      Current Outpatient Medications on File Prior to Visit   Medication Sig Dispense Refill    Aimovig 140 MG/ML SOAJ ADMINISTER 1 ML UNDER THE SKIN EVERY 4 WEEKS      carBAMazepine (CARBATROL) 300 MG 12 hr capsule 300 mg every  12 (twelve) hours   1    Casanthranol-Docusate Sodium (STOOL SOFTENER PLUS PO) Take by mouth Rarely takes as needed      ciclopirox (PENLAC) 8 % solution Apply topically daily at bedtime 6 mL 0    rosuvastatin (CRESTOR) 20 MG tablet Take 1 tablet (20 mg total) by mouth daily 30 tablet 1    SUMAtriptan (IMITREX) 100 mg tablet Take 1 tablet (100 mg total) by mouth once as needed for migraine for up to 60 doses 9 tablet 0    tamsulosin (FLOMAX) 0.4 mg Take 1 capsule (0.4 mg total) by mouth daily with dinner 30 capsule 1    valACYclovir (VALTREX) 500 mg tablet 500 mg daily As needed  2    montelukast (SINGULAIR) 10 mg tablet Take 1 tablet (10 mg total) by mouth daily at bedtime (Patient not taking: Reported on 2/3/2025) 30 tablet 1     No current facility-administered medications on file prior to visit.     Allergies   Allergen Reactions    Ciprofloxacin Lightheadedness     dizzy    Phenytoin Rash     Reaction Date: 05Oct2010;       Current Outpatient Medications on File Prior to Visit   Medication Sig Dispense Refill    Aimovig 140 MG/ML SOAJ ADMINISTER 1 ML UNDER THE SKIN EVERY 4 WEEKS      carBAMazepine (CARBATROL) 300 MG 12 hr capsule 300 mg every 12 (twelve) hours   1    Casanthranol-Docusate Sodium (STOOL SOFTENER PLUS PO) Take by mouth Rarely takes as needed      ciclopirox (PENLAC) 8 % solution Apply topically daily at bedtime 6 mL 0    rosuvastatin (CRESTOR) 20 MG tablet Take 1 tablet (20 mg total) by mouth daily 30 tablet 1    SUMAtriptan (IMITREX) 100 mg tablet Take 1 tablet (100 mg total) by mouth once as needed for migraine for up to 60 doses 9 tablet 0    tamsulosin (FLOMAX) 0.4 mg Take 1 capsule (0.4 mg total) by mouth daily with dinner 30 capsule 1    valACYclovir (VALTREX) 500 mg tablet 500 mg daily As needed  2    montelukast (SINGULAIR) 10 mg tablet Take 1 tablet (10 mg total) by mouth daily at bedtime (Patient not taking: Reported on 2/3/2025) 30 tablet 1     No current facility-administered  medications on file prior to visit.      Social History     Tobacco Use    Smoking status: Former     Current packs/day: 0.00     Average packs/day: 1 pack/day for 20.0 years (20.0 ttl pk-yrs)     Types: Cigarettes     Start date: 6/15/1992     Quit date: 2007     Years since quittin.7     Passive exposure: Never    Smokeless tobacco: Never   Vaping Use    Vaping status: Never Used   Substance and Sexual Activity    Alcohol use: Not Currently     Comment: rare    Drug use: Yes     Types: Marijuana     Comment: gummy    Sexual activity: Not on file         Objective     /70 (BP Location: Left arm, Patient Position: Sitting, Cuff Size: Adult)   Pulse 82   Ht 5' (1.524 m)   Wt 64.9 kg (143 lb)   SpO2 98%   BMI 27.93 kg/m²     Physical Exam  General: Well appearing, well nourished, in no distress. Oriented x 3, normal mood and affect.  Ambulating without difficulty.  Skin: Good turgor, no unusual bruising or prominent lesions.  Mild diffuse facial erythema noted, with dry raised lesions scattered on her right cheek.  Hair: Normal texture and distribution.  Nails: Normal color, no deformities.  HEENT:  Head: Normocephalic, atraumatic.  Eyes: Conjunctiva clear, sclera non-icteric, EOM intact.  Nose: No external lesions.  Neck: Supple.  Extremities: No amputations or deformities, cyanosis, edema.  Musculoskeletal (prior):   Feet - mildly positive MTP squeeze test bilaterally.   There is no peripheral joint soft tissue swelling noted.  Neurologic: Alert and oriented. No focal neurological deficits appreciated.   Psychiatric: Normal mood and affect.

## 2025-02-03 NOTE — ASSESSMENT & PLAN NOTE
- She denies a history of thrombotic events.  To review the persistently positive anticardiolipin antibody, I recommend an appointment with hematology.    Orders:    Ambulatory Referral to Hematology / Oncology; Future

## 2025-02-03 NOTE — ASSESSMENT & PLAN NOTE
"Ms. Howell is a 49-year-old female with no significant past medical history who presents for a follow up of a positive FEMI 1:80 speckled pattern, persistently positive anticardiolipin IgM antibody and skin rash.       - Karly presents today for a follow up of a positive FEMI and anticardiolipin IgM antibody that were detected to evaluate photosensitive and possibly sunscreen associated skin rashes she has been experiencing since last summer.  This has predominantly affected her face in a malar distribution and has been more frequent recently, with prior occurrences also involving her neck, chest, upper arms as well as an isolated self resolving episode of skin rashes/erythema overlying her joints.  Her review of systems is also pertinent for chronic dry eyes as well as bilateral forefoot pain over the past few months.  Her physical examination is unrevealing and there are no prominent cutaneous manifestations identified.  Given this presentation it may raise suspicion for an underlying autoimmune disease, but her serological workup has been unrevealing except for a positive FEMI which would be insufficient to establish a diagnosis.  In view of this I recommend considering a skin biopsy from an active rash or possibly even from \"normal\" appearing skin as histopathological changes can still be identified if there are concerns for lupus.  She will discuss this with her dermatologist at their upcoming appointment.  If she is hesitant to proceed with a facial biopsy then we can monitor for a recurrence of other areas of skin rash in the summer that may be amenable to biopsy.    - We discussed non-rheumatic causes of the skin rash may include solar related skin conditions versus possibly an allergic response.  She will monitor to see if there may be an association with injecting Aimovig, but there is no clear literature on cutaneous side effects.         "

## 2025-02-03 NOTE — ASSESSMENT & PLAN NOTE
- As she does experience chronic sicca complaints, with a history of skin rash and photosensitivity, it may be reasonable to evaluate for Sjogren's syndrome.  I offered her a referral to ENT for consideration of a minor salivary gland lip biopsy.    Orders:    Ambulatory Referral to Otolaryngology; Future

## 2025-02-07 ENCOUNTER — E-CONSULT (OUTPATIENT)
Dept: HEMATOLOGY ONCOLOGY | Facility: CLINIC | Age: 50
End: 2025-02-07
Payer: COMMERCIAL

## 2025-02-07 ENCOUNTER — DOCUMENTATION (OUTPATIENT)
Dept: HEMATOLOGY ONCOLOGY | Facility: CLINIC | Age: 50
End: 2025-02-07

## 2025-02-07 ENCOUNTER — TELEPHONE (OUTPATIENT)
Dept: RHEUMATOLOGY | Facility: CLINIC | Age: 50
End: 2025-02-07

## 2025-02-07 DIAGNOSIS — R76.0 ANTI-CARDIOLIPIN ANTIBODY POSITIVE: Primary | ICD-10-CM

## 2025-02-07 PROCEDURE — 99447 NTRPROF PH1/NTRNET/EHR 11-20: CPT | Performed by: NURSE PRACTITIONER

## 2025-02-07 NOTE — PROGRESS NOTES
Please let patient know hematology does not require an office appointment for the cardiolipin antibody and they did a chart review consult. She can review their note in her chart.

## 2025-02-07 NOTE — PROGRESS NOTES
I recommend a hematology evaluation for antiphospholipid antibodies even if they have never had a blood clot so they can receive education from a hematology perspective and discussion regards to risk factors etc. Will place e-consult.

## 2025-02-07 NOTE — PROGRESS NOTES
E-Consult  Karly Howell 49 y.o. female MRN: [id number]  Encounter Date: 02/07/25      Reason for Consult / Principal Problem: Persistent cardiolipin antibodies without thrombosis, recommend patient education about significance, modifiable risk factors etc.     Consulting Provider: GAGANDEEP Watt    Requesting Provider: Mary Matthews MD       ASSESSMENT:  Patient is a 49-year-old female following with rheumatology for positive FEMI, skin rash  She has persistently positive anticardiolipin IgM antibody, no history of thrombotic events    Component  Ref Range & Units (hover) 11/11/24 11:51 AM 6/29/24 10:41 AM   Anticardiolipin IgG 13 12 CM   Comment:                           Negative:              <15                            Indeterminate:     15 - 20                            Low-Med Positive: >20 - 80                            High Positive:         >80   Anticardiolipin IgM 25 High  27 High  CM   Comment:                           Negative:              <13                            Indeterminate:     13 - 20                            Low-Med Positive: >20 - 80                            High Positive:         >80   Anticardiolipin IgA <9 <9 CM   Comment:                           Negative:              <12                            Indeterminate:     12 - 20                            Low-Med Positive: >20 - 80                            High Positive:         >80     No lupus anticoagulant detects.   Beta-2 glycoprotein antibodies negative.     RECOMMENDATIONS:  Patient has positive anticardiolipin IgM antibody with no evidence of lupus anticoagulant, no beta-2 glycoprotein antibodies are positive.  This rules out antiphospholipid syndrome.  Positive anticardiolipin IgM antibody could indicate a condition like systemic lupus erythematous and increased risk of clotting or recurrent miscarriages.    Patient has never had a clotting event.  Unclear if there is significant family history of  clotting.  Regardless there is really nothing to do with this information at this time.  I would not start her on blood thinner for a positive finding without a history of clotting.  This positive test is not an indicator and cannot predict if or when a blood clot may happen    Modifiable risk factors to prevent clot from occurring include living healthy active lifestyle.  Obesity, sedentary lifestyle can increase risk of clotting.  Avoid tobacco use as that can increase risk for clotting.  Prolonged immobilization secondary to trauma/injury/surgery could also increased risk for clotting.  In the settings she would be recommended prophylactic anticoagulation like any other patient postoperatively, excetra      Total time spent 11-20 minutes, >50% of the total time devoted to medical consultative verbal/EMR discussion between providers. Written report will be generated in the EMR..

## 2025-02-27 DIAGNOSIS — J45.909 UNCOMPLICATED ASTHMA, UNSPECIFIED ASTHMA SEVERITY, UNSPECIFIED WHETHER PERSISTENT: ICD-10-CM

## 2025-02-27 RX ORDER — ROSUVASTATIN CALCIUM 20 MG/1
20 TABLET, COATED ORAL DAILY
Qty: 30 TABLET | Refills: 1 | Status: SHIPPED | OUTPATIENT
Start: 2025-02-27 | End: 2025-02-28

## 2025-02-28 RX ORDER — ROSUVASTATIN CALCIUM 20 MG/1
20 TABLET, COATED ORAL DAILY
Qty: 90 TABLET | Refills: 1 | Status: SHIPPED | OUTPATIENT
Start: 2025-02-28

## 2025-03-13 DIAGNOSIS — G43.109 MIGRAINE WITH AURA AND WITHOUT STATUS MIGRAINOSUS, NOT INTRACTABLE: ICD-10-CM

## 2025-03-14 RX ORDER — SUMATRIPTAN SUCCINATE 100 MG/1
100 TABLET ORAL ONCE AS NEEDED
Qty: 9 TABLET | Refills: 2 | Status: SHIPPED | OUTPATIENT
Start: 2025-03-14

## 2025-05-02 DIAGNOSIS — G43.109 MIGRAINE WITH AURA AND WITHOUT STATUS MIGRAINOSUS, NOT INTRACTABLE: ICD-10-CM

## 2025-05-03 RX ORDER — SUMATRIPTAN SUCCINATE 100 MG/1
100 TABLET ORAL ONCE AS NEEDED
Qty: 9 TABLET | Refills: 0 | Status: SHIPPED | OUTPATIENT
Start: 2025-05-03

## 2025-05-13 ENCOUNTER — PROCEDURE VISIT (OUTPATIENT)
Dept: UROLOGY | Facility: AMBULATORY SURGERY CENTER | Age: 50
End: 2025-05-13
Payer: COMMERCIAL

## 2025-05-13 VITALS
OXYGEN SATURATION: 99 % | DIASTOLIC BLOOD PRESSURE: 82 MMHG | WEIGHT: 141 LBS | HEIGHT: 60 IN | SYSTOLIC BLOOD PRESSURE: 124 MMHG | BODY MASS INDEX: 27.68 KG/M2 | HEART RATE: 82 BPM

## 2025-05-13 DIAGNOSIS — R31.0 GROSS HEMATURIA: ICD-10-CM

## 2025-05-13 DIAGNOSIS — N20.0 CALCULUS OF KIDNEY: Primary | ICD-10-CM

## 2025-05-13 DIAGNOSIS — N20.0 NEPHROLITHIASIS: ICD-10-CM

## 2025-05-13 LAB
SL AMB  POCT GLUCOSE, UA: NORMAL
SL AMB LEUKOCYTE ESTERASE,UA: NORMAL
SL AMB POCT BILIRUBIN,UA: NORMAL
SL AMB POCT BLOOD,UA: NORMAL
SL AMB POCT CLARITY,UA: CLEAR
SL AMB POCT COLOR,UA: YELLOW
SL AMB POCT KETONES,UA: NORMAL
SL AMB POCT NITRITE,UA: NORMAL
SL AMB POCT PH,UA: 6
SL AMB POCT SPECIFIC GRAVITY,UA: 1.01
SL AMB POCT URINE PROTEIN: NORMAL
SL AMB POCT UROBILINOGEN: 0.2

## 2025-05-13 PROCEDURE — 52000 CYSTOURETHROSCOPY: CPT | Performed by: UROLOGY

## 2025-05-13 PROCEDURE — 81002 URINALYSIS NONAUTO W/O SCOPE: CPT | Performed by: UROLOGY

## 2025-05-13 NOTE — PROGRESS NOTES
Cystoscopy     Date/Time  5/13/2025 1:00 PM     Performed by  Morgan Fernandes MD   Authorized by  Morgan Fernandes MD         Procedure Details:  Procedure type: cystoscopy      Karly is a 49-year-old female with a history of nephrolithiasis and gross hematuria.  Her last imaging was a CT renal protocol performed in October 2024 when she had her gross hematuria.  The scan revealed nonobstructing bilateral renal calculi.  She then developed right flank pain approximately 1 month later and underwent a CT stone study.  The scan revealed a 3 mm right UVJ calculus with mild right-sided hydronephrosis.  She returns to the office today to undergo cystoscopy.      Cystoscopy Procedure note:    Risk and benefits of flexible cystoscopy were discussed. Informed consent was obtained. A urine dip is adequate for cystoscopy. The patient was placed into the modified supine position. Her genitalia was prepped and draped in a sterile fashion. Viscous lidocaine was instilled into the urethra. Flexible cystoscopy was then performed. The bladder was thoroughly inspected. Both ureteral orifices were visualized with clear efflux of urine. The bladder mucosa was thoroughly inspected. There was no evidence of mucosal abnormalities, stones, or lesions. Retroflexion was normal. Overall this was a negative cystoscopy. A female office staff member was present throughout the entire procedure.    Impression: Likely passed 3 mm right UVJ calculus, resolved gross hematuria    Plan: I provided the patient with reassurance that cystoscopic evaluation in the office today is within normal limits.  To ensure stone passage I have recommended obtaining a KUB as well as a renal and bladder ultrasound to ensure that there is no evidence of residual right-sided hydronephrosis.  Outpatient follow-up in the [facility] office has been requested.

## 2025-05-23 ENCOUNTER — NURSE TRIAGE (OUTPATIENT)
Age: 50
End: 2025-05-23

## 2025-05-23 DIAGNOSIS — R39.9 UTI SYMPTOMS: Primary | ICD-10-CM

## 2025-05-23 NOTE — TELEPHONE ENCOUNTER
FOLLOW UP: Ordered urine culture and urinalysis     REASON FOR CONVERSATION: Difficulty Urinating    SYMPTOMS: Urgency and frequency, also an odor    OTHER: Had cystoscopy 5/13    DISPOSITION: Order Lab Work (overriding Next Available Appointment with Provider)          Reason for Disposition   The patient has an unknown case of mild dysuria    Answer Assessment - Initial Assessment Questions  1. When did your symptoms start?   Since Tuesday 5/21  2. Do you experience pain or burning with every void?   Uncomfortable at this point not burning  3. Do you have any other urinary symptoms such as urinary frequency, urgency, incontinence, blood in your urine?   Frequency and urgency denies hematuria or incontinence  4. Do you have any abdominal pain or flank pain?  denies  5. Do you have a fever of 101 or higher? How did you take your temperature?   denies  6 Have you become unable to urinate?   denies  8. Do you have a history of urinary tract infections?   yes  9. Have you had a recent urologic procedure, surgery, or any recent urine testing?   Cystoscopy 5/13    Protocols used: Urology-Dysuria-ADULT-[place]

## 2025-05-24 ENCOUNTER — TELEMEDICINE (OUTPATIENT)
Dept: OTHER | Facility: HOSPITAL | Age: 50
End: 2025-05-24
Payer: COMMERCIAL

## 2025-05-24 ENCOUNTER — APPOINTMENT (OUTPATIENT)
Dept: LAB | Facility: HOSPITAL | Age: 50
End: 2025-05-24
Payer: COMMERCIAL

## 2025-05-24 DIAGNOSIS — R39.9 URINARY SYMPTOM OR SIGN: Primary | ICD-10-CM

## 2025-05-24 PROCEDURE — 99214 OFFICE O/P EST MOD 30 MIN: CPT | Performed by: PHYSICIAN ASSISTANT

## 2025-05-24 NOTE — ASSESSMENT & PLAN NOTE
Discussed with patient that this does not seem infectious and more likely is mixed contaminants are normal jason presenting in the endoscopic UA.  I advised her that I would probably wait until the culture comes back to see if there is true infection.  Patient is a creative to this plan.  We discussed that should she develop fevers, abdominal pain, back pain, blood in the urine she should be reevaluated sooner.  Discussed can use over-the-counter Azo for symptomatic relief for the next 24 hours.  Noted change in urine color and possibility of staining clothes.  Drink plenty of water   Cranberry supplements  Urinate within 5 minutes following intercourse  Follow up with OB/GYN   If tests have been ordered our office will contact you with results if changes need to be made to the care plan discussed with you at the visit.  You [facility] full [facility] on [facility]  Follow up with PCP in 3-5 days.  Proceed to  ER if symptoms worsen.  If you need to contact care everywhere please call [phone]

## 2025-05-24 NOTE — PROGRESS NOTES
Virtual Regular Visit  Name: Karly Howell      : 1975      MRN: [id number]  Encounter Provider: Venus Dominguez PA-C  Encounter Date: 2025   Encounter department: VIRTUAL CARE   :  Assessment & Plan  Urinary symptom or sign       Discussed with patient that this does not seem infectious and more likely is mixed contaminants are normal jason presenting in the endoscopic UA.  I advised her that I would probably wait until the culture comes back to see if there is true infection.  Patient is a creative to this plan.  We discussed that should she develop fevers, abdominal pain, back pain, blood in the urine she should be reevaluated sooner.  Discussed can use over-the-counter Azo for symptomatic relief for the next 24 hours.  Noted change in urine color and possibility of staining clothes.  Drink plenty of water   Cranberry supplements  Urinate within 5 minutes following intercourse  Follow up with OB/GYN   If tests have been ordered our office will contact you with results if changes need to be made to the care plan discussed with you at the visit.  You can review your full results on [facility]  Follow up with PCP in 3-5 days.  Proceed to  ER if symptoms worsen.  If you need to contact care everywhere please call [phone]        History of Present Illness     This is a 49-year-old female with a past medical history of migraines, epilepsy, anxiety here complaining of had cystoscopy on 25. Notes on 25 she started with urgency, frequency, burning with urination.  She spoke with urology and they ordered a UA with microscopic reflex to culture.  She had that done this morning and notes the results are back.  Would show 1-0 white blood cells occasional epithelial and occasional bacterial.  There is no blood or leukoesterase.  She denies fever, abdominal pain, back pain, vomiting or diarrhea, shortness of breath, chest pain, hematuria.  She has not taken any medications for  symptoms.          Review of Systems   Constitutional:  Negative for fever.   Respiratory:  Negative for shortness of breath.    Cardiovascular:  Negative for chest pain.   Gastrointestinal:  Negative for abdominal pain, diarrhea and vomiting.   Genitourinary:  Positive for dysuria, frequency and urgency. Negative for flank pain and hematuria.   Musculoskeletal:  Negative for back pain.       Objective   There were no vitals taken for this visit.    Physical Exam  Constitutional:       General: She is not in acute distress.     Appearance: Normal appearance. She is not ill-appearing, toxic-appearing or diaphoretic.   HENT:      Nose: Nose normal.   Pulmonary:      Effort: Pulmonary effort is normal.      Comments: Patient able to speak in multiple full sentences without needing to break or pause.   Abdominal:      Tenderness: There is no abdominal tenderness. There is no right CVA tenderness or left CVA tenderness.     Skin:     Comments: No rashes noted on head or neck.      Neurological:      General: No focal deficit present.      Mental Status: She is alert and oriented to person, place, and time.     Psychiatric:         Mood and Affect: Mood normal.         Behavior: Behavior normal.         Administrative Statements   Encounter provider Venus Dominguez PA-C    The Patient is located at [facility] and in the following state in which I hold an active license [place].    The patient was identified by name and date of birth. Karly MEJIA Howell was informed that this is a telemedicine visit and that the visit is being conducted through the Epic Embedded platform. She agrees to proceed..  My office door was closed. No one else was in the room.  She acknowledged consent and understanding of privacy and security of the video platform. The patient has agreed to participate and understands they can discontinue the visit at any time.    I have spent a total time of 12 minutes in caring for this patient on the day of the  visit/encounter including Risks and benefits of tx options, Instructions for management, Patient and family education, Documenting in the medical record, Reviewing/placing orders in the medical record (including tests, medications, and/or procedures), and Obtaining or reviewing history  , not including the time spent for establishing the audio/video connection.

## 2025-05-26 ENCOUNTER — RESULTS FOLLOW-UP (OUTPATIENT)
Dept: OTHER | Facility: HOSPITAL | Age: 50
End: 2025-05-26

## 2025-06-03 ENCOUNTER — HOSPITAL ENCOUNTER (OUTPATIENT)
Dept: RADIOLOGY | Facility: HOSPITAL | Age: 50
Discharge: HOME | End: 2025-06-03
Attending: UROLOGY
Payer: COMMERCIAL

## 2025-06-03 ENCOUNTER — HOSPITAL ENCOUNTER (OUTPATIENT)
Dept: RADIOLOGY | Facility: HOSPITAL | Age: 50
Discharge: HOME | End: 2025-06-03
Payer: COMMERCIAL

## 2025-06-03 ENCOUNTER — TELEPHONE (OUTPATIENT)
Age: 50
End: 2025-06-03

## 2025-06-03 DIAGNOSIS — N20.0 NEPHROLITHIASIS: ICD-10-CM

## 2025-06-03 PROCEDURE — 76775 US EXAM ABDO BACK WALL LIM: CPT

## 2025-06-03 PROCEDURE — 74018 RADEX ABDOMEN 1 VIEW: CPT

## 2025-06-03 NOTE — TELEPHONE ENCOUNTER
Patient called unable to see SHAN in [facility] - informed her it was there and ready to go at any [facility] location. She will complete this shortly.

## 2025-06-16 ENCOUNTER — OFFICE VISIT (OUTPATIENT)
Dept: OBGYN CLINIC | Facility: CLINIC | Age: 50
End: 2025-06-16
Payer: COMMERCIAL

## 2025-06-16 ENCOUNTER — APPOINTMENT (OUTPATIENT)
Dept: RADIOLOGY | Facility: CLINIC | Age: 50
End: 2025-06-16
Attending: ORTHOPAEDIC SURGERY
Payer: COMMERCIAL

## 2025-06-16 VITALS — HEIGHT: 60 IN | WEIGHT: 141 LBS | BODY MASS INDEX: 27.68 KG/M2

## 2025-06-16 DIAGNOSIS — M25.551 RIGHT HIP PAIN: ICD-10-CM

## 2025-06-16 DIAGNOSIS — M76.891 HIP ABDUCTOR TENDONITIS, RIGHT: Primary | ICD-10-CM

## 2025-06-16 PROCEDURE — 73502 X-RAY EXAM HIP UNI 2-3 VIEWS: CPT

## 2025-06-16 PROCEDURE — 99213 OFFICE O/P EST LOW 20 MIN: CPT | Performed by: ORTHOPAEDIC SURGERY

## 2025-06-16 NOTE — PROGRESS NOTES
Patient Name: Karly Howell      : 1975       MRN: [id number]   Encounter Provider: Stephen Tompkins MD   Encounter Date: 25  Encounter department: [facility]         Assessment & Plan  Hip abductor tendonitis, right  Plan:  Karly is presenting with signs and symptoms consistent with hip abductor tendinitis.  We reviewed her x-rays together there is no evidence of significant degenerative disease.  There is a small piece of insertional calcific tendinitis at the greater trochanter which appears to be nonsymptomatic today.  We discussed that there could be some involvement with the sacroiliac joint though she is nontender today.  We also discussed that knee pain can often be associated with hip pain.  I would like her to begin physical therapy for the right hip.  In therapy they will likely begin to strengthen the hip and core.  If this does not resolve her knee pain I asked that she return to see me.  All of her questions were answered to her satisfaction.  Orders:    XR hip/pelv 2-3 vws right if performed; Future    Ambulatory Referral to Physical Therapy; Future           Follow-up:  No follow-ups on file.     _____________________________________________________  CHIEF COMPLAINT:  Chief Complaint   Patient presents with    Right Hip - Pain         SUBJECTIVE:  Karly Howell is a 49 y.o. female who presents for initial evaluation of right hip pain. Karly began with atraumatic right hip pain for the past 3 weeks.  She describes an intermittent ache in the lateral and posterior aspect of her hip that can radiate into the buttock. She has an hour commute to [employer] daily.  Standing after prolonged sitting will increase her discomfort. Two weeks ago navigating stairs was painful for her.  Lying on her right side can be painful.  She states her hip pain has been improving over the past week or so but continues more intermittently. She denies groin or back pain.  She  "denies distal paresthesias.  Karly has a secondary complaint of right knee pain as well.  She describes an intermittent sharp pain at the medial aspect of the knee and parapatellar region.      PAST MEDICAL HISTORY:  Past Medical History[1]    PAST SURGICAL HISTORY:  Past Surgical History[2]    FAMILY HISTORY:  Family History[3]    SOCIAL HISTORY:  Social History[4]    MEDICATIONS:  Current Medications[5]    ALLERGIES:  Allergies[6]    LABS:  HgA1c:   Lab Results   Component Value Date    HGBA1C 5.3 04/10/2023     BMP:   Lab Results   Component Value Date    GLUCOSE 92 05/16/2017    CALCIUM 8.4 05/19/2024     07/18/2016    K 4.6 10/12/2024    CO2 26 10/12/2024     10/12/2024    BUN 15 10/12/2024    CREATININE 0.77 10/12/2024     CBC: No components found for: \"CBC\"    _____________________________________________________  Review of Systems   Constitutional:  Negative for chills, fever and unexpected weight change.   HENT:  Negative for hearing loss, nosebleeds and sore throat.    Eyes:  Negative for pain, redness and visual disturbance.   Respiratory:  Negative for cough, shortness of breath and wheezing.    Cardiovascular:  Negative for chest pain, palpitations and leg swelling.   Gastrointestinal:  Negative for abdominal pain, nausea and vomiting.   Endocrine: Negative for polydipsia and polyuria.   Genitourinary:  Negative for dysuria and hematuria.   Musculoskeletal:         See HPI   Skin:  Negative for rash and wound.   Neurological:  Negative for dizziness, numbness and headaches.   Psychiatric/Behavioral:  Negative for decreased concentration and suicidal ideas. The patient is not nervous/anxious.         Right Hip Exam     Range of Motion   Abduction:  50   Adduction:  30   Flexion:  130   External rotation:  70   Internal rotation:  30     Muscle Strength   Abduction: 5/5   Adduction: 5/5   Flexion: 5/5     Other   Sensation: normal    Comments:  Kirit (-)             Physical Exam  Vitals " reviewed.   Constitutional:       Appearance: She is well-developed.   HENT:      Head: Normocephalic and atraumatic.     Eyes:      General:         Right eye: No discharge.         Left eye: No discharge.      Conjunctiva/sclera: Conjunctivae normal.       Cardiovascular:      Rate and Rhythm: Regular rhythm.   Pulmonary:      Effort: Pulmonary effort is normal. No respiratory distress.      Breath sounds: No stridor.     Musculoskeletal:      Cervical back: Normal range of motion and neck supple.      Comments: As noted in the HPI.     Skin:     General: Skin is warm and dry.     Neurological:      Mental Status: She is alert and oriented to person, place, and time.     Psychiatric:         Behavior: Behavior normal.        _____________________________________________________  STUDIES REVIEWED:  I personally reviewed the pertinent images and my independent interpretation is as follows:  X-rays of the right hip performed today demonstrate a well-preserved femoral acetabular joint.  There is a small piece of insertional calcific tendinitis at the greater trochanter.  There is no evidence of acute fracture or other osseous abnormality.    PROCEDURES PERFORMED:  No procedures performed today.    Scribe Attestation      I,:  Omi Cai am acting as a scribe while in the presence of the attending physician.:       I,:  Stephen Tompkins MD personally performed the services described in this documentation    as scribed in my presence.:                   [1]   Past Medical History:  Diagnosis Date    Cervical radiculopathy     RESOLVED: [id number] buldging disc    GERD (gastroesophageal reflux disease)     Hyperlipidemia     Kidney stone     Migraines     Seizure (HCC)     age 24   [2]   Past Surgical History:  Procedure Laterality Date    ADENOIDECTOMY      COLONOSCOPY  09/26/2024    EGD  09/26/2024    EGD AND COLONOSCOPY  09/18/2019    LITHOTRIPSY  2006    RENAL    OVARIAN CYST REMOVAL  2012    NM HYSTEROSCOPY  BX ENDOMETRIUM&/POLYPC W/WO D&C N/A 10/28/2019    Procedure: OPERATIVE HYSTEROSCOPY (MYOSURE),POLYPECTOMY,D AND C;  Surgeon: Roel Rothman MD;  Location: [facility];  Service: Gynecology   [3]   Family History  Problem Relation Name Age of Onset    Multiple sclerosis Mother      Colon cancer Maternal Grandmother  70    Rheum arthritis Maternal Grandmother      Arthritis Family      Colon cancer Family      Osteoporosis Family      Anxiety disorder Father      Chromosomal disorder Cousin      No Known Problems Sister Do     No Known Problems Daughter Teodora     Skin cancer Maternal Grandfather  65        not sure of exact age.    No Known Problems Paternal Grandmother      No Known Problems Paternal Grandfather      No Known Problems Daughter Che     No Known Problems Son      No Known Problems Maternal Aunt      No Known Problems Maternal Aunt      No Known Problems Paternal Aunt     [4]   Social History  Tobacco Use    Smoking status: Former     Current packs/day: 0.00     Average packs/day: 1 pack/day for 20.0 years (20.0 ttl pk-yrs)     Types: Cigarettes     Start date: 6/15/1992     Quit date: 2007     Years since quittin.1     Passive exposure: Never    Smokeless tobacco: Never   Vaping Use    Vaping status: Never Used   Substance Use Topics    Alcohol use: Not Currently     Comment: rare    Drug use: Yes     Types: Marijuana     Comment: gummy   [5]   Current Outpatient Medications:     Aimovig 140 MG/ML SOAJ, , Disp: , Rfl:     carBAMazepine (CARBATROL) 300 MG 12 hr capsule, 300 mg every 12 (twelve) hours, Disp: , Rfl: 1    ciclopirox (PENLAC) 8 % solution, Apply topically daily at bedtime, Disp: 6 mL, Rfl: 0    rosuvastatin (CRESTOR) 20 MG tablet, TAKE 1 TABLET(20 MG) BY MOUTH DAILY, Disp: 90 tablet, Rfl: 1    SUMAtriptan (IMITREX) 100 mg tablet, Take 1 tablet (100 mg total) by mouth once as needed for migraine for up to 60 doses, Disp: 9 tablet, Rfl: 0    valACYclovir (VALTREX) 500 mg tablet,  500 mg in the morning. As needed., Disp: , Rfl: 2    Casanthranol-Docusate Sodium (STOOL SOFTENER PLUS PO), Take by mouth Rarely takes as needed, Disp: , Rfl:     montelukast (SINGULAIR) 10 mg tablet, Take 1 tablet (10 mg total) by mouth daily at bedtime (Patient not taking: Reported on 5/13/2025), Disp: 30 tablet, Rfl: 1    tamsulosin (FLOMAX) 0.4 mg, Take 1 capsule (0.4 mg total) by mouth daily with dinner, Disp: 30 capsule, Rfl: 1  [6]   Allergies  Allergen Reactions    Ciprofloxacin Lightheadedness     dizzy    Phenytoin Rash     Reaction Date: 05Oct2010;

## 2025-06-22 DIAGNOSIS — G43.109 MIGRAINE WITH AURA AND WITHOUT STATUS MIGRAINOSUS, NOT INTRACTABLE: ICD-10-CM

## 2025-06-22 RX ORDER — SUMATRIPTAN SUCCINATE 100 MG/1
100 TABLET ORAL ONCE AS NEEDED
Qty: 9 TABLET | Refills: 0 | Status: SHIPPED | OUTPATIENT
Start: 2025-06-22

## 2025-06-26 NOTE — ASSESSMENT & PLAN NOTE
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   10/17/2024 CT urogram  Right-2 mm upper pole and 3 mm lower pole stone  Left-4 mm upper and lower pole stone  Mild nonspecific central ventral bladder wall thickening      11/7/2024 UA moderate blood, urinary red blood cells 20-30  (3 mm right UVJ stone noted on CT)  05/13/2025 cystoscopy Dr. Fernandes -unremarkable  05/24/2025 UA with no blood on dip or microscope  07/01/2025 UA clear

## 2025-06-26 NOTE — PROGRESS NOTES
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   10/17/2024 CT urogram  Right-2 mm upper pole and 3 mm lower pole stone  Left-4 mm upper and lower pole stone  Mild nonspecific central ventral bladder wall thickening      2024 UA moderate blood, urinary red blood cells 20-30  (3 mm right UVJ stone noted on CT)  2025 cystoscopy Dr. Fernandes -unremarkable  2025 UA with no blood on dip or microscope  2025 UA clear          Calculus of kidney  Small bilateral nonobstructive renal stones noted on renal ultrasound 2025  Previous passed 3 mm right UVJ stone as noted on CT 2024  KUB 25 b/l renal calculus measuring up to 5 mm  Renal US 25   Right-2 echogenic foci with twinkle artifact measuring 4 mm.   Left-4 and 2 mm echogenic foci with twinkle artifact  -low oxalate diet  -f/u 1 year with renal US and KUB prior   -Wants to hold on nephrology evaluation unless stones enlarge to having more episode    -Dietary Management of Kidney Stone Disease    The dietary recommendations for most people who make kidney stones (especially the most common calcium oxalate stones) are uncomplicated and are not too tedious or bland.  Most importantly, the following recommendations also promote better health for a variety of reasons.    FLUIDS:  The single most important change for the majority patients is the need to greatly increase fluid intake.  You should at least produce two liters (about two quarts) of urine each day.  Depending on the heat outdoors and your level of physical activity, this usually means consuming ten, 10 ounce glasses (100 ounces) of fluid  per day.  Water is always a good choice, but other drinks including tea, coffee, soda, and juice are also allowed as long as no one beverage becomes the sole source of fluid.    CALCIUM:  There is excellent evidence that calcium should not be avoided, but instead moderated.  A range of 600 to 1,100 mg of calcium per day, especially consumed at meals is probably a reasonable target. (i.e. 2-3 dairy servings per day) This might include small servings of yogurt, milk or ice cream.  This amount helps avoid over-absorption of oxalate from the digestive tract and also allows for healthy bone maintenance.    SODIUM (SALT):  Too much salt in your diet (both from the shaker and in the prepared foods that we buy) is bad for your blood pressure, bad for your heart, and also increases the amount of calcium in your urine.  A reasonable sodium restriction to 2,000-2,500mg/day (about the amount in one teaspoon) is an excellent target.  You should get into the habit of reading the “Nutrients” labels on all the foods that you eat and watch out for the foods that have a high sodium content (snack foods, smoked or processed foods, caned foods).  Fresh and frozen foods usually have the least amount of sodium.    PROTEIN:  High protein diets from animal meat (beef, chicken, pork, fish) also increases the rate of kidney stone formation and is equally unhealthy for your heart.  All patients should moderate their meat intake to 3-7 ounces per day, and particularly stay away from red meat protein.    OXALATE:  Most stone-formers should avoid heavy intake of oxalate-rich foods.  These include green roughage (spinach, mustard, kale), strawberries, chocolate, tea, iced tea, and nuts.  In addition, heavy, excess doses of Vitamin C can also produce surges in urinary oxalate levels and should be avoided.    BARE-BONES RECOMMENDATIONS:  Fluids, fluids, fluids.    Low salt diet (your primary care doctor will love you).  Moderate calcium (dairy  "products), especially with meals.  Moderate red meat intake.      Orders:    US kidney and bladder with pvr; Future    XR abdomen 1 view kub; Future        History of Present Illness   Karly Howell is a 49 y.o. female with a history of gross hematuria and renal stones.  Previously had negative cystoscopy and CT scan indicating renal and a 3mm right UVJ  stone.  Most recent renal ultrasound 06/03/2025 indicated small bilateral renal calculi.    CT images and KUB images reviewed with pt          Radiology  09/19/2024 renal ultrasound  Right 4 mm lower pole stone  Left-nonobstructive calculi measuring up to 5 mm    10/17/2024 CT urogram  Right-2 mm upper pole and 3 mm lower pole stone  Left-4 mm upper and lower pole stone  Mild nonspecific central ventral bladder wall thickening    11/7/2024 CT AP without contrast  3 mm right UVJ stone  Nonobstructive bilateral stones    06/03/2025 renal ultrasound  Right-2 echogenic foci with twinkle artifact measuring 4 mm.  No obstruction  Left-4 and 2 mm echogenic foci with twinkle artifact    06/03/25 KUB    Bilateral nephrolithiasis measuring up to 5 mm.       Labs   11/7/2024 UA moderate blood, urinary red blood cells 20-30  (3 mm right UVJ stone noted on CT)  05/24/2025 UA with no blood on dip or microscope    Past Medical History      Past  History  No calculus  Gross hematuria most likely from renal calculus    Past  surgical history   Cysto, ureteroscopy, and stent ~ 19yrs ago and f/u [facility]  Dr. Reid?   05/13/2025 cystoscopy Dr. Fernandes -unremarkable    Prior Visits             Objective   /60 (BP Location: Left arm, Patient Position: Sitting, Cuff Size: Adult)   Pulse 68   Ht 5' 1\" (1.549 m)   Wt 62.6 kg (138 lb)   SpO2 98%   BMI 26.07 kg/m²     Physical Exam  HENT:      Head: Normocephalic.   Pulmonary:      Effort: Pulmonary effort is normal.   Abdominal:      Tenderness: There is no right CVA tenderness or left CVA tenderness.     Skin:     General: " "Skin is warm.     Neurological:      Mental Status: She is alert.     Psychiatric:         Mood and Affect: Mood normal.           Results   No results found for: \"PSA\"  Lab Results   Component Value Date    GLUCOSE 92 05/16/2017    CALCIUM 8.4 05/19/2024     07/18/2016    K 4.6 10/12/2024    CO2 26 10/12/2024     10/12/2024    BUN 15 10/12/2024    CREATININE 0.77 10/12/2024     Lab Results   Component Value Date    WBC 8.44 05/19/2024    HGB 12.9 05/19/2024    HCT 38.4 05/19/2024    MCV 94 05/19/2024     05/19/2024       Office Urine Dip  No results found for this or any previous visit (from the past hour).        "

## 2025-07-01 ENCOUNTER — OFFICE VISIT (OUTPATIENT)
Dept: UROLOGY | Facility: CLINIC | Age: 50
End: 2025-07-01

## 2025-07-01 VITALS
BODY MASS INDEX: 26.06 KG/M2 | OXYGEN SATURATION: 98 % | HEIGHT: 61 IN | HEART RATE: 68 BPM | SYSTOLIC BLOOD PRESSURE: 112 MMHG | DIASTOLIC BLOOD PRESSURE: 60 MMHG | WEIGHT: 138 LBS

## 2025-07-01 DIAGNOSIS — N20.0 CALCULUS OF KIDNEY: ICD-10-CM

## 2025-07-01 DIAGNOSIS — R31.0 GROSS HEMATURIA: Primary | ICD-10-CM

## 2025-07-01 LAB
SL AMB  POCT GLUCOSE, UA: NORMAL
SL AMB LEUKOCYTE ESTERASE,UA: NORMAL
SL AMB POCT BILIRUBIN,UA: NORMAL
SL AMB POCT BLOOD,UA: NORMAL
SL AMB POCT CLARITY,UA: CLEAR
SL AMB POCT COLOR,UA: YELLOW
SL AMB POCT KETONES,UA: NORMAL
SL AMB POCT NITRITE,UA: NORMAL
SL AMB POCT PH,UA: 6.5
SL AMB POCT SPECIFIC GRAVITY,UA: 1
SL AMB POCT URINE PROTEIN: NORMAL
SL AMB POCT UROBILINOGEN: 0.2

## 2025-07-01 NOTE — PATIENT INSTRUCTIONS
Dietary Management of Kidney Stone Disease    The dietary recommendations for most people who make kidney stones (especially the most common calcium oxalate stones) are uncomplicated and are not too tedious or bland.  Most importantly, the following recommendations also promote better health for a variety of reasons.    FLUIDS:  The single most important change for the majority patients is the need to greatly increase fluid intake.  You should at least produce two liters (about two quarts) of urine each day.  Depending on the heat outdoors and your level of physical activity, this usually means consuming ten, 10 ounce glasses (100 ounces) of fluid per day.  Water is always a good choice, but other drinks including tea, coffee, soda, and juice are also allowed as long as no one beverage becomes the sole source of fluid.    CALCIUM:  There is excellent evidence that calcium should not be avoided, but instead moderated.  A range of 600 to 1,100 mg of calcium per day, especially consumed at meals is probably a reasonable target. (i.e. 2-3 dairy servings per day) This might include small servings of yogurt, milk or ice cream.  This amount helps avoid over-absorption of oxalate from the digestive tract and also allows for healthy bone maintenance.    SODIUM (SALT):  Too much salt in your diet (both from the shaker and in the prepared foods that we buy) is bad for your blood pressure, bad for your heart, and also increases the amount of calcium in your urine.  A reasonable sodium restriction to 2,000-2,500mg/day (about the amount in one teaspoon) is an excellent target.  You should get into the habit of reading the “Nutrients” labels on all the foods that you eat and watch out for the foods that have a high sodium content (snack foods, smoked or processed foods, caned foods).  Fresh and frozen foods usually have the least amount of sodium.    PROTEIN:  High protein diets from animal meat (beef, chicken, pork, fish) also  increases the rate of kidney stone formation and is equally unhealthy for your heart.  All patients should moderate their meat intake to 3-7 ounces per day, and particularly stay away from red meat protein.    OXALATE:  Most stone-formers should avoid heavy intake of oxalate-rich foods.  These include green roughage (spinach, mustard, kale), strawberries, chocolate, tea, iced tea, and nuts.  In addition, heavy, excess doses of Vitamin C can also produce surges in urinary oxalate levels and should be avoided.    BARE-BONES RECOMMENDATIONS:  Fluids, fluids, fluids.    Low salt diet (your primary care doctor will love you).  Moderate calcium (dairy products), especially with meals.  Moderate red meat intake.

## 2025-07-08 ENCOUNTER — TRANSCRIBE ORDERS (OUTPATIENT)
Dept: LAB | Facility: CLINIC | Age: 50
End: 2025-07-08

## 2025-07-08 ENCOUNTER — APPOINTMENT (OUTPATIENT)
Dept: LAB | Facility: CLINIC | Age: 50
End: 2025-07-08
Payer: COMMERCIAL

## 2025-07-08 DIAGNOSIS — R76.8 POSITIVE ANA (ANTINUCLEAR ANTIBODY): ICD-10-CM

## 2025-07-08 DIAGNOSIS — Z79.899 ENCOUNTER FOR LONG-TERM (CURRENT) USE OF MEDICATIONS: Primary | ICD-10-CM

## 2025-07-08 DIAGNOSIS — Z79.899 ENCOUNTER FOR LONG-TERM (CURRENT) USE OF MEDICATIONS: ICD-10-CM

## 2025-07-08 DIAGNOSIS — R21 RASH: ICD-10-CM

## 2025-07-08 LAB
ALBUMIN SERPL BCG-MCNC: 3.9 G/DL (ref 3.5–5)
ALP SERPL-CCNC: 78 U/L (ref 34–104)
ALT SERPL W P-5'-P-CCNC: 14 U/L (ref 7–52)
ANION GAP SERPL CALCULATED.3IONS-SCNC: 7 MMOL/L (ref 4–13)
AST SERPL W P-5'-P-CCNC: 19 U/L (ref 13–39)
BASOPHILS # BLD AUTO: 0.09 THOUSANDS/ÂΜL (ref 0–0.1)
BASOPHILS NFR BLD AUTO: 1 % (ref 0–1)
BILIRUB SERPL-MCNC: 0.35 MG/DL (ref 0.2–1)
BUN SERPL-MCNC: 13 MG/DL (ref 5–25)
CALCIUM SERPL-MCNC: 8.5 MG/DL (ref 8.4–10.2)
CARBAMAZEPINE SERPL-MCNC: 8.3 UG/ML (ref 4–12)
CHLORIDE SERPL-SCNC: 103 MMOL/L (ref 96–108)
CHOLEST SERPL-MCNC: 194 MG/DL (ref ?–200)
CO2 SERPL-SCNC: 28 MMOL/L (ref 21–32)
CREAT SERPL-MCNC: 0.69 MG/DL (ref 0.6–1.3)
EOSINOPHIL # BLD AUTO: 0.09 THOUSAND/ÂΜL (ref 0–0.61)
EOSINOPHIL NFR BLD AUTO: 1 % (ref 0–6)
ERYTHROCYTE [DISTWIDTH] IN BLOOD BY AUTOMATED COUNT: 12.1 % (ref 11.6–15.1)
GFR SERPL CREATININE-BSD FRML MDRD: 102 ML/MIN/1.73SQ M
GLUCOSE P FAST SERPL-MCNC: 97 MG/DL (ref 65–99)
HCT VFR BLD AUTO: 40.7 % (ref 34.8–46.1)
HDLC SERPL-MCNC: 76 MG/DL
HGB BLD-MCNC: 13.3 G/DL (ref 11.5–15.4)
IGA SERPL-MCNC: 257 MG/DL (ref 66–433)
IMM GRANULOCYTES # BLD AUTO: 0.02 THOUSAND/UL (ref 0–0.2)
IMM GRANULOCYTES NFR BLD AUTO: 0 % (ref 0–2)
LDLC SERPL CALC-MCNC: 104 MG/DL (ref 0–100)
LYMPHOCYTES # BLD AUTO: 2.55 THOUSANDS/ÂΜL (ref 0.6–4.47)
LYMPHOCYTES NFR BLD AUTO: 39 % (ref 14–44)
MCH RBC QN AUTO: 31.7 PG (ref 26.8–34.3)
MCHC RBC AUTO-ENTMCNC: 32.7 G/DL (ref 31.4–37.4)
MCV RBC AUTO: 97 FL (ref 82–98)
MONOCYTES # BLD AUTO: 0.53 THOUSAND/ÂΜL (ref 0.17–1.22)
MONOCYTES NFR BLD AUTO: 8 % (ref 4–12)
NEUTROPHILS # BLD AUTO: 3.34 THOUSANDS/ÂΜL (ref 1.85–7.62)
NEUTS SEG NFR BLD AUTO: 51 % (ref 43–75)
NRBC BLD AUTO-RTO: 0 /100 WBCS
PLATELET # BLD AUTO: 257 THOUSANDS/UL (ref 149–390)
PMV BLD AUTO: 10 FL (ref 8.9–12.7)
POTASSIUM SERPL-SCNC: 4.2 MMOL/L (ref 3.5–5.3)
PROT SERPL-MCNC: 6.3 G/DL (ref 6.4–8.4)
RBC # BLD AUTO: 4.2 MILLION/UL (ref 3.81–5.12)
SODIUM SERPL-SCNC: 138 MMOL/L (ref 135–147)
TRIGL SERPL-MCNC: 72 MG/DL (ref ?–150)
WBC # BLD AUTO: 6.62 THOUSAND/UL (ref 4.31–10.16)

## 2025-07-08 PROCEDURE — 82784 ASSAY IGA/IGD/IGG/IGM EACH: CPT

## 2025-07-08 PROCEDURE — 36415 COLL VENOUS BLD VENIPUNCTURE: CPT

## 2025-07-08 PROCEDURE — 80156 ASSAY CARBAMAZEPINE TOTAL: CPT

## 2025-07-08 PROCEDURE — 86364 TISS TRNSGLTMNASE EA IG CLAS: CPT

## 2025-07-08 PROCEDURE — 80061 LIPID PANEL: CPT

## 2025-07-08 PROCEDURE — 85025 COMPLETE CBC W/AUTO DIFF WBC: CPT

## 2025-07-08 PROCEDURE — 80053 COMPREHEN METABOLIC PANEL: CPT

## 2025-07-14 ENCOUNTER — EVALUATION (OUTPATIENT)
Dept: PHYSICAL THERAPY | Facility: CLINIC | Age: 50
End: 2025-07-14
Attending: ORTHOPAEDIC SURGERY
Payer: COMMERCIAL

## 2025-07-14 DIAGNOSIS — M76.891 HIP ABDUCTOR TENDONITIS, RIGHT: ICD-10-CM

## 2025-07-14 PROCEDURE — 97161 PT EVAL LOW COMPLEX 20 MIN: CPT

## 2025-07-14 PROCEDURE — 97140 MANUAL THERAPY 1/> REGIONS: CPT

## 2025-07-14 NOTE — LETTER
2025    Stephen Tompkins MD  [address] [facility] [address]    Patient: Karly Howell   YOB: 1975   Date of Visit: 2025     Encounter Diagnosis     ICD-10-CM    1. Hip abductor tendonitis, right  M76.891 Ambulatory Referral to Physical Therapy          Dear Dr. Alexander Joseph Johnson, MD Frank Lombardi, DO:    Thank you for your recent referral of Karly Howell. Please review the attached evaluation summary from Karly's recent visit.     Please verify that you agree with the plan of care by signing the attached order.     If you have any questions or concerns, please do not hesitate to call.     I sincerely appreciate the opportunity to share in the care of one of your patients and hope to have another opportunity to work with you in the near future.       Sincerely,    Cheikh Pineda, PT      Referring Provider:      I certify that I have read the below Plan of Care and certify the need for these services furnished under this plan of treatment while under my care.                    Stephen Tompkins MD  [address] [facility] [place] [address]  Via In Basket              PT Evaluation   Today's date: 2025  Patient name: Karly Howell  : 1975  MRN: [id number]  Referring provider: Stephen Tompkins*  Dx:   Encounter Diagnosis     ICD-10-CM    1. Hip abductor tendonitis, right  M76.891 Ambulatory Referral to Physical Therapy          Assessment    Karly Howell is a pleasant 49 y.o. female who presents with a chief complaint of right hip pain over the last few months. She presented with increased pain at [facility] as well as ilopsoas and piriformis. Her pain changes locations based off of transfers and activities. She has positive quadrant testing and has hypermobility at TLJ and L2 that could be a contributing factor to her hip pain. She has gross weakness in her hips and her core musculature. Anterior pelvic tilting is also present and can hinder her ROM and  positioning. At this time Karly can benefit from skilled PT to address the deficits and to return to  full pain free function as well as preventing further injury to her hip.     Symptom Irritability: moderate    Patient education performed this session included: home exercise program, plan of care, and prognosis and diagnosis    Patient verbalized good understanding of POC.    Please contact me if you have any questions or recommendations. Thank you for the referral and the opportunity to share in Karly Howell's care.      Plan    Patient would benefit from: Skilled PT  Planned therapy interventions: activity modification, behavior modification, body mechanics training, functional ROM exercises, graded exercise, HEP, joint mobilization, manual therapy, Hamilton taping, motor coordination training, neuromuscular re-education, patient education, postural training, strengthening, stretching, therapeutic activities, and therapeutic exercises    Frequency: 2x per week  Duration in weeks: 6 weeks    Plan of Care beginning date: 7/14/2025  Plan of Care expiration date: 6 weeks - 8/25/2025  Treatment plan discussed with: patient    Goals    Short Term Goals (3 weeks):    Patient will be independent with basic HEP.  Patient will report >50% reduction in pain.  Able to sit for 1 hour in car without pain    Long Term Goals (6 weeks):  Patient will be independent in a comprehensive home exercise program  Patient FOTO score will improve to 80/100  Patient will self-report >75% improvement in function  Able to stand >20 minutes without symptoms  Able to sleep through the night without pain.      Subjective    History of Present Illness/Subjective: In May Karly was at work when she tried to take a few steps running and felt a pain when her foot hit the ground in the back of her hip and in the front of her hip. The pain has remained unchanged and bothers her when she is standing, walking and getting in and out of the car. She said  that she can touch the spots and reproduce the pain.     Functional Limitations: Walking, Lifting, Push/pull, stairs, sitting prolonged periods of time.     Prior level of function: Independent with no increased symptoms    Progression: no change      Patient goals for therapy: decrease pain, increase strength, and independence with ADLs    Pain   7/14/2025    Current 3/10    Best 3/10    Worst 8/10     Location: Right Hip  Description: burning, discomfort, dull aching, and sharp  Aggravating factors: sitting, standing, walking, ascending stairs, and descending stairs      Physical Examination      RANGE OF MOTION    Lumbar ROM Over-Pressure     Flexion 100%     Extension 75%     Lt Rotation 75%     Rt Rotation 75%     Lt Lateral Flexion 75%     Rt Lateral Flexion 75%      Hip  AROM R PROM R AROM L PROM L    Flexion 90 120      Abduction 35 45      Extension 0 5      External Rotation        Internal Rotation             Lower Quarter Screen     Myotomes  Hip flexion (L2): normal  Knee extension (L3): normal  Ankle Dorsiflexion (L4): normal  Big Toe Extension (L5): normal  Ankle Plantarfexion (S1): normal  Knee Flexion (S2): normal    Reflexes   Knee (L3/L4): 2 R, 2 L  Ankle (L5/S1): 2 R, 2 L     0 = absent, 1 = decreased, 2 = normal, 3 = increased, 4 = clonus     Sensation: Normal    Strength    LE MMT   7/14/2025    LEFT RIGHT     Hip Flexion 5/5 4/5    Hip Extension 5/5 4/5    Hip Abduction 5/5 3+/5    Hip Adduction 5/5 5/5    Hip IR 5/5 4/5    Hip ER 5/5 4/5       Knee Flexion 5/5 5/5    Knee Extension 5/5 5/5       Ankle DF 5/5 5/5    Ankle PF 5/5 5/5    Ankle Inversion 5/5 5/5    Ankle Eversion 5/5 5/5       Great Toe Extension 5/5 5/5       Leg Lower 4    Sit Up 4     Joint Play  T10: WFL  T11: WFL  T12: hypermobile  L1: hypermobile  L2: hypermobile  L3: WFL  L4: WFL  L5: WFL    Palpation  (+) TTP of QL, TrA, iliopsoas, piriformis, and lateral hip distraction with increased pains    Flexibility   7/14/2025     LEFT RIGHT     Hamstring WNL WNL    Quads Tight Tight    Piriformis TIght Tight     Special Tests    Lumbar  Quadrant sign: +  Slump: +  SLR: -  Prone instability: -    SI cluster  Distraction: -  Compression: -  Thigh thrust: -  Sacral thrust: -  Gaenslen's test: -    Hip   FADIR: -  MATTI: -  FADER: -  Log roll: -  Hip Scour: -  Piriformis: -  Resisted ER rotation test: -     Functional Assessment  Balance SLS:   LEFT: 5 sec   RIGHT: 3 sec  Functional Squat: Able to perform full functional squat with arms overhead. Increased forward trunk lean with increased L/S extension and knee valgus  Gait Assessment: Mild right sided Trendelenburg with decreased stance time relative to left side.            Past Medical History[1]   Current Outpatient Medications   Medication Instructions   • Aimovig 140 MG/ML SOAJ    • carBAMazepine (CARBATROL) 300 mg, Every 12 hours   • ciclopirox (PENLAC) 8 % solution Topical, Daily at bedtime   • rosuvastatin (CRESTOR) 20 mg, Oral, Daily   • SUMAtriptan (IMITREX) 100 mg, Oral, Once as needed   • valACYclovir (VALTREX) 500 mg, Daily      Past Surgical History[2]     Insurance:  AMA/CMS Eval/ Re-eval Auth #/ Referral # Total units  Start date  Expiration date Extension  Visit limitation?  PT only or  PT+OT? Co-Insurance    7/14/2025             POC Start Date POC Expiration Date Signed POC?   7/14/2025 6 weeks - 8/25/2025      Date 7/14/2025        Visit Number IE        FOTO Tracking Intake survey     Follow-up #1   Manual         Thoracic mobs         Lumbar mobs Lateral distraction,  Soft tissue to lumbar and hip flexors                                   TherEx                                                                   [1]  Past Medical History:  Diagnosis Date   • Cervical radiculopathy     RESOLVED: [id number] buldging disc   • GERD (gastroesophageal reflux disease)    • Hyperlipidemia    • Kidney stone    • Migraines    • Seizure (HCC)     age 24   [2]  Past Surgical  History:  Procedure Laterality Date   • ADENOIDECTOMY     • COLONOSCOPY  09/26/2024   • EGD  09/26/2024   • EGD AND COLONOSCOPY  09/18/2019   • LITHOTRIPSY  2006    RENAL   • OVARIAN CYST REMOVAL  2012   • CT HYSTEROSCOPY BX ENDOMETRIUM&/POLYPC W/WO D&C N/A 10/28/2019    Procedure: OPERATIVE HYSTEROSCOPY (MYOSURE),POLYPECTOMY,D AND C;  Surgeon: Roel Rothman MD;  Location: [facility];  Service: Gynecology        [1]  Past Medical History:  Diagnosis Date   • Cervical radiculopathy     RESOLVED: [id number] buldging disc   • GERD (gastroesophageal reflux disease)    • Hyperlipidemia    • Kidney stone    • Migraines    • Seizure (HCC)     age 24   [2]  Past Surgical History:  Procedure Laterality Date   • ADENOIDECTOMY     • COLONOSCOPY  09/26/2024   • EGD  09/26/2024   • EGD AND COLONOSCOPY  09/18/2019   • LITHOTRIPSY  2006    RENAL   • OVARIAN CYST REMOVAL  2012   • CT HYSTEROSCOPY BX ENDOMETRIUM&/POLYPC W/WO D&C N/A 10/28/2019    Procedure: OPERATIVE HYSTEROSCOPY (MYOSURE),POLYPECTOMY,D AND C;  Surgeon: Roel Rothman MD;  Location: [facility];  Service: Gynecology

## 2025-07-14 NOTE — PROGRESS NOTES
PT Evaluation   Today's date: 2025  Patient name: Karly Howell  : 1975  MRN: [id number]  Referring provider: Stephen Tompkins*  Dx:   Encounter Diagnosis     ICD-10-CM    1. Hip abductor tendonitis, right  M76.891 Ambulatory Referral to Physical Therapy          Assessment    Karly Howell is a pleasant 49 y.o. female who presents with a chief complaint of right hip pain over the last few months. She presented with increased pain at [facility] as well as ilopsoas and piriformis. Her pain changes locations based off of transfers and activities. She has positive quadrant testing and has hypermobility at TLJ and L2 that could be a contributing factor to her hip pain. She has gross weakness in her hips and her core musculature. Anterior pelvic tilting is also present and can hinder her ROM and positioning. At this time Karly can benefit from skilled PT to address the deficits and to return to  full pain free function as well as preventing further injury to her hip.     Symptom Irritability: moderate    Patient education performed this session included: home exercise program, plan of care, and prognosis and diagnosis    Patient verbalized good understanding of POC.    Please contact me if you have any questions or recommendations. Thank you for the referral and the opportunity to share in Karly Howell's care.      Plan    Patient would benefit from: Skilled PT  Planned therapy interventions: activity modification, behavior modification, body mechanics training, functional ROM exercises, graded exercise, HEP, joint mobilization, manual therapy, Hamilton taping, motor coordination training, neuromuscular re-education, patient education, postural training, strengthening, stretching, therapeutic activities, and therapeutic exercises    Frequency: 2x per week  Duration in weeks: 6 weeks    Plan of Care beginning date: 2025  Plan of Care expiration date: 6 weeks - 2025  Treatment plan discussed with:  patient    Goals    Short Term Goals (3 weeks):    Patient will be independent with basic HEP.  Patient will report >50% reduction in pain.  Able to sit for 1 hour in car without pain    Long Term Goals (6 weeks):  Patient will be independent in a comprehensive home exercise program  Patient FOTO score will improve to 80/100  Patient will self-report >75% improvement in function  Able to stand >20 minutes without symptoms  Able to sleep through the night without pain.      Subjective    History of Present Illness/Subjective: In May Karly was at work when she tried to take a few steps running and felt a pain when her foot hit the ground in the back of her hip and in the front of her hip. The pain has remained unchanged and bothers her when she is standing, walking and getting in and out of the car. She said that she can touch the spots and reproduce the pain.     Functional Limitations: Walking, Lifting, Push/pull, stairs, sitting prolonged periods of time.     Prior level of function: Independent with no increased symptoms    Progression: no change      Patient goals for therapy: decrease pain, increase strength, and independence with ADLs    Pain   7/14/2025    Current 3/10    Best 3/10    Worst 8/10     Location: Right Hip  Description: burning, discomfort, dull aching, and sharp  Aggravating factors: sitting, standing, walking, ascending stairs, and descending stairs      Physical Examination      RANGE OF MOTION    Lumbar ROM Over-Pressure     Flexion 100%     Extension 75%     Lt Rotation 75%     Rt Rotation 75%     Lt Lateral Flexion 75%     Rt Lateral Flexion 75%      Hip  AROM R PROM R AROM L PROM L    Flexion 90 120      Abduction 35 45      Extension 0 5      External Rotation        Internal Rotation             Lower Quarter Screen     Myotomes  Hip flexion (L2): normal  Knee extension (L3): normal  Ankle Dorsiflexion (L4): normal  Big Toe Extension (L5): normal  Ankle Plantarfexion (S1): normal  Knee  Flexion (S2): normal    Reflexes   Knee (L3/L4): 2 R, 2 L  Ankle (L5/S1): 2 R, 2 L     0 = absent, 1 = decreased, 2 = normal, 3 = increased, 4 = clonus     Sensation: Normal    Strength    LE MMT   7/14/2025    LEFT RIGHT     Hip Flexion 5/5 4/5    Hip Extension 5/5 4/5    Hip Abduction 5/5 3+/5    Hip Adduction 5/5 5/5    Hip IR 5/5 4/5    Hip ER 5/5 4/5       Knee Flexion 5/5 5/5    Knee Extension 5/5 5/5       Ankle DF 5/5 5/5    Ankle PF 5/5 5/5    Ankle Inversion 5/5 5/5    Ankle Eversion 5/5 5/5       Great Toe Extension 5/5 5/5       Leg Lower 4    Sit Up 4     Joint Play  T10: WFL  T11: WFL  T12: hypermobile  L1: hypermobile  L2: hypermobile  L3: WFL  L4: WFL  L5: WFL    Palpation  (+) TTP of QL, TrA, iliopsoas, piriformis, and lateral hip distraction with increased pains    Flexibility   7/14/2025    LEFT RIGHT     Hamstring WNL WNL    Quads Tight Tight    Piriformis TIght Tight     Special Tests    Lumbar  Quadrant sign: +  Slump: +  SLR: -  Prone instability: -    SI cluster  Distraction: -  Compression: -  Thigh thrust: -  Sacral thrust: -  Gaenslen's test: -    Hip   FADIR: -  MATTI: -  FADER: -  Log roll: -  Hip Scour: -  Piriformis: -  Resisted ER rotation test: -     Functional Assessment  Balance SLS:   LEFT: 5 sec   RIGHT: 3 sec  Functional Squat: Able to perform full functional squat with arms overhead. Increased forward trunk lean with increased L/S extension and knee valgus  Gait Assessment: Mild right sided Trendelenburg with decreased stance time relative to left side.            Past Medical History[1]   Current Outpatient Medications   Medication Instructions    Aimovig 140 MG/ML SOAJ     carBAMazepine (CARBATROL) 300 mg, Every 12 hours    ciclopirox (PENLAC) 8 % solution Topical, Daily at bedtime    rosuvastatin (CRESTOR) 20 mg, Oral, Daily    SUMAtriptan (IMITREX) 100 mg, Oral, Once as needed    valACYclovir (VALTREX) 500 mg, Daily      Past Surgical History[2]     Insurance:  A/CMS  Eval/ Re-eval Auth #/ Referral # Total units  Start date  Expiration date Extension  Visit limitation?  PT only or  PT+OT? Co-Insurance    7/14/2025             POC Start Date POC Expiration Date Signed POC?   7/14/2025 6 weeks - 8/25/2025      Date 7/14/2025        Visit Number IE        FOTO Tracking Intake survey     Follow-up #1   Manual         Thoracic mobs         Lumbar mobs Lateral distraction,  Soft tissue to lumbar and hip flexors                                   TherEx                                                   [1]   Past Medical History:  Diagnosis Date    Cervical radiculopathy     RESOLVED: [id number] buldging disc    GERD (gastroesophageal reflux disease)     Hyperlipidemia     Kidney stone     Migraines     Seizure (HCC)     age 24   [2]   Past Surgical History:  Procedure Laterality Date    ADENOIDECTOMY      COLONOSCOPY  09/26/2024    EGD  09/26/2024    EGD AND COLONOSCOPY  09/18/2019    LITHOTRIPSY  2006    RENAL    OVARIAN CYST REMOVAL  2012    [place] HYSTEROSCOPY BX ENDOMETRIUM&/POLYPC W/WO D&C N/A 10/28/2019    Procedure: OPERATIVE HYSTEROSCOPY (MYOSURE),POLYPECTOMY,D AND C;  Surgeon: Roel Rothman MD;  Location: [facility];  Service: Gynecology

## 2025-07-17 LAB — TTG IGA SER IA-ACNC: 0.6 U/ML (ref ?–10)

## 2025-07-21 ENCOUNTER — OFFICE VISIT (OUTPATIENT)
Dept: PHYSICAL THERAPY | Facility: CLINIC | Age: 50
End: 2025-07-21
Attending: ORTHOPAEDIC SURGERY
Payer: COMMERCIAL

## 2025-07-21 DIAGNOSIS — M76.891 HIP ABDUCTOR TENDONITIS, RIGHT: Primary | ICD-10-CM

## 2025-07-21 PROCEDURE — 97140 MANUAL THERAPY 1/> REGIONS: CPT | Performed by: PHYSICAL THERAPIST

## 2025-07-21 PROCEDURE — 97110 THERAPEUTIC EXERCISES: CPT | Performed by: PHYSICAL THERAPIST

## 2025-07-21 NOTE — PROGRESS NOTES
"Daily Note     Today's date: 2025  Patient name: Karly Howell  : 1975  MRN: [id number]  Referring provider: Stephen Tompkins*  Dx:   Encounter Diagnosis     ICD-10-CM    1. Hip abductor tendonitis, right  M76.891                      Subjective: No new symptoms to report. States that Monday and Tuesday are \"good days\" as she does not have to drive for long periods of time.       Objective: See treatment diary below      Assessment: Tolerated treatment well. Patient responded well to core strengthening exercises and hip strengthening exercises. Required cues in order to maintain neutral pelvic position while doing standing core strengthening exercises. She did fatigue very quickly with exercises and needed rest breaks in between. Pt will continue to benefit from skilled PT to address the deficits and to return to  full pain free function.     Plan: Continue per plan of care.      Past Medical History[1]   Current Outpatient Medications   Medication Instructions    Aimovig 140 MG/ML SOAJ     carBAMazepine (CARBATROL) 300 mg, Every 12 hours    ciclopirox (PENLAC) 8 % solution Topical, Daily at bedtime    rosuvastatin (CRESTOR) 20 mg, Oral, Daily    SUMAtriptan (IMITREX) 100 mg, Oral, Once as needed    valACYclovir (VALTREX) 500 mg, Daily      Past Surgical History[2]     Insurance:  AMA/CMS Eval/ Re-eval Auth #/ Referral # Total units  Start date  Expiration date Extension  Visit limitation?  PT only or  PT+OT? Co-Insurance    2025             POC Start Date POC Expiration Date Signed POC?   2025 6 weeks - 2025      Date 25       Visit Number IE 2       FOTO Tracking Intake survey     Follow-up #1   Manual         Thoracic mobs         Lumbar mobs Lateral distraction,  Soft tissue to lumbar and hip flexors Lateral distraction- IM              Gr 2-3 inferior glide of hip joint- IM                          TherEx           Clamshells sidelying 2 x 10 B          TrA iso " 5s x 10            TrA + bridge x 10          Pallof press x 10 B purple tube cues for neutral pelvis                                                                                               [1]   Past Medical History:  Diagnosis Date    Cervical radiculopathy     RESOLVED: [id number] buldging disc    GERD (gastroesophageal reflux disease)     Hyperlipidemia     Kidney stone     Migraines     Seizure (HCC)     age 24   [2]   Past Surgical History:  Procedure Laterality Date    ADENOIDECTOMY      COLONOSCOPY  09/26/2024    EGD  09/26/2024    EGD AND COLONOSCOPY  09/18/2019    LITHOTRIPSY  2006    RENAL    OVARIAN CYST REMOVAL  2012    [place] HYSTEROSCOPY BX ENDOMETRIUM&/POLYPC W/WO D&C N/A 10/28/2019    Procedure: OPERATIVE HYSTEROSCOPY (MYOSURE),POLYPECTOMY,D AND C;  Surgeon: Roel Rothman MD;  Location: [facility];  Service: Gynecology

## 2025-07-28 ENCOUNTER — OFFICE VISIT (OUTPATIENT)
Dept: PHYSICAL THERAPY | Facility: CLINIC | Age: 50
End: 2025-07-28
Attending: ORTHOPAEDIC SURGERY
Payer: COMMERCIAL

## 2025-07-28 DIAGNOSIS — M76.891 HIP ABDUCTOR TENDONITIS, RIGHT: Primary | ICD-10-CM

## 2025-07-28 DIAGNOSIS — G43.109 MIGRAINE WITH AURA AND WITHOUT STATUS MIGRAINOSUS, NOT INTRACTABLE: ICD-10-CM

## 2025-07-28 PROCEDURE — 97110 THERAPEUTIC EXERCISES: CPT | Performed by: PHYSICAL THERAPIST

## 2025-07-29 ENCOUNTER — OFFICE VISIT (OUTPATIENT)
Dept: PHYSICAL THERAPY | Facility: CLINIC | Age: 50
End: 2025-07-29
Attending: ORTHOPAEDIC SURGERY
Payer: COMMERCIAL

## 2025-07-29 DIAGNOSIS — M76.891 HIP ABDUCTOR TENDONITIS, RIGHT: Primary | ICD-10-CM

## 2025-07-29 PROCEDURE — 97140 MANUAL THERAPY 1/> REGIONS: CPT

## 2025-07-29 PROCEDURE — 97110 THERAPEUTIC EXERCISES: CPT

## 2025-07-29 RX ORDER — SUMATRIPTAN SUCCINATE 100 MG/1
100 TABLET ORAL ONCE AS NEEDED
Qty: 9 TABLET | Refills: 1 | Status: SHIPPED | OUTPATIENT
Start: 2025-07-29

## 2025-08-14 ENCOUNTER — OFFICE VISIT (OUTPATIENT)
Dept: PHYSICAL THERAPY | Facility: CLINIC | Age: 50
End: 2025-08-14
Attending: ORTHOPAEDIC SURGERY
Payer: COMMERCIAL

## 2025-08-18 ENCOUNTER — OFFICE VISIT (OUTPATIENT)
Dept: RHEUMATOLOGY | Facility: CLINIC | Age: 50
End: 2025-08-18
Payer: COMMERCIAL

## 2025-08-18 VITALS
WEIGHT: 135 LBS | HEART RATE: 77 BPM | OXYGEN SATURATION: 98 % | HEIGHT: 61 IN | SYSTOLIC BLOOD PRESSURE: 120 MMHG | BODY MASS INDEX: 25.49 KG/M2 | TEMPERATURE: 98 F | DIASTOLIC BLOOD PRESSURE: 74 MMHG

## 2025-08-18 DIAGNOSIS — R76.8 POSITIVE ANA (ANTINUCLEAR ANTIBODY): Primary | ICD-10-CM

## 2025-08-18 DIAGNOSIS — M79.671 BILATERAL FOOT PAIN: ICD-10-CM

## 2025-08-18 DIAGNOSIS — H04.123 CHRONIC DRYNESS OF BOTH EYES: ICD-10-CM

## 2025-08-18 DIAGNOSIS — R21 SKIN RASH: ICD-10-CM

## 2025-08-18 DIAGNOSIS — M79.672 BILATERAL FOOT PAIN: ICD-10-CM

## 2025-08-18 DIAGNOSIS — R76.0 ANTI-CARDIOLIPIN ANTIBODY POSITIVE: ICD-10-CM

## 2025-08-18 DIAGNOSIS — L56.8 PHOTOSENSITIVITY: ICD-10-CM

## 2025-08-18 PROCEDURE — 99214 OFFICE O/P EST MOD 30 MIN: CPT | Performed by: INTERNAL MEDICINE

## (undated) DEVICE — DEVICE MYOSURE TISSUE REMOVAL HYSTEROSCOPIC

## (undated) DEVICE — MYOSURE SEAL SET

## (undated) DEVICE — TUBE OUTFLOW F/FLUID CONTROL SYSTEM AQUILEX

## (undated) DEVICE — MAYO STAND COVER: Brand: CONVERTORS

## (undated) DEVICE — TUBE INFLOW F/FLUID CONTROL SYSTEM AQUILEX

## (undated) DEVICE — CYSTO TUBING SINGLE IRRIGATION